# Patient Record
Sex: MALE | Race: WHITE | Employment: OTHER | URBAN - METROPOLITAN AREA
[De-identification: names, ages, dates, MRNs, and addresses within clinical notes are randomized per-mention and may not be internally consistent; named-entity substitution may affect disease eponyms.]

---

## 2017-03-22 ENCOUNTER — TELEPHONE (OUTPATIENT)
Dept: PRIMARY CARE CLINIC | Age: 42
End: 2017-03-22

## 2017-03-22 ENCOUNTER — OFFICE VISIT (OUTPATIENT)
Dept: PRIMARY CARE CLINIC | Age: 42
End: 2017-03-22
Payer: COMMERCIAL

## 2017-03-22 VITALS
SYSTOLIC BLOOD PRESSURE: 128 MMHG | BODY MASS INDEX: 40.57 KG/M2 | DIASTOLIC BLOOD PRESSURE: 82 MMHG | HEART RATE: 81 BPM | TEMPERATURE: 98.2 F | RESPIRATION RATE: 18 BRPM | WEIGHT: 258.5 LBS | HEIGHT: 67 IN

## 2017-03-22 DIAGNOSIS — Z76.89 ESTABLISHING CARE WITH NEW DOCTOR, ENCOUNTER FOR: ICD-10-CM

## 2017-03-22 DIAGNOSIS — R20.0 BILATERAL HAND NUMBNESS: Primary | ICD-10-CM

## 2017-03-22 PROCEDURE — 99203 OFFICE O/P NEW LOW 30 MIN: CPT | Performed by: NURSE PRACTITIONER

## 2017-03-22 ASSESSMENT — ENCOUNTER SYMPTOMS
RHINORRHEA: 0
DIARRHEA: 0
WHEEZING: 0
NAUSEA: 0
COUGH: 0
SHORTNESS OF BREATH: 0
CONSTIPATION: 0
SORE THROAT: 0
ABDOMINAL PAIN: 0
VOMITING: 0

## 2017-05-08 ENCOUNTER — OFFICE VISIT (OUTPATIENT)
Dept: PRIMARY CARE CLINIC | Age: 42
End: 2017-05-08
Payer: COMMERCIAL

## 2017-05-08 VITALS
HEART RATE: 113 BPM | RESPIRATION RATE: 18 BRPM | DIASTOLIC BLOOD PRESSURE: 88 MMHG | SYSTOLIC BLOOD PRESSURE: 141 MMHG | WEIGHT: 262.8 LBS | BODY MASS INDEX: 41.25 KG/M2 | TEMPERATURE: 97.9 F | HEIGHT: 67 IN

## 2017-05-08 DIAGNOSIS — R20.0 BILATERAL HAND NUMBNESS: Primary | ICD-10-CM

## 2017-05-08 DIAGNOSIS — R29.898 DECREASED GRIP STRENGTH OF RIGHT HAND: ICD-10-CM

## 2017-05-08 DIAGNOSIS — R29.898 DECREASED GRIP STRENGTH OF LEFT HAND: ICD-10-CM

## 2017-05-08 PROCEDURE — 99213 OFFICE O/P EST LOW 20 MIN: CPT | Performed by: NURSE PRACTITIONER

## 2017-05-08 ASSESSMENT — ENCOUNTER SYMPTOMS: BACK PAIN: 0

## 2017-05-23 ENCOUNTER — TELEPHONE (OUTPATIENT)
Dept: PRIMARY CARE CLINIC | Age: 42
End: 2017-05-23

## 2017-05-23 DIAGNOSIS — G56.03 BILATERAL CARPAL TUNNEL SYNDROME: Primary | ICD-10-CM

## 2017-06-23 ENCOUNTER — ANESTHESIA EVENT (OUTPATIENT)
Dept: OPERATING ROOM | Age: 42
End: 2017-06-23
Payer: COMMERCIAL

## 2017-06-26 ENCOUNTER — HOSPITAL ENCOUNTER (OUTPATIENT)
Age: 42
Setting detail: OUTPATIENT SURGERY
Discharge: HOME | End: 2017-06-26
Attending: ORTHOPAEDIC SURGERY | Admitting: ORTHOPAEDIC SURGERY
Payer: COMMERCIAL

## 2017-06-26 ENCOUNTER — ANESTHESIA (OUTPATIENT)
Dept: OPERATING ROOM | Age: 42
End: 2017-06-26
Payer: COMMERCIAL

## 2017-06-26 VITALS
BODY MASS INDEX: 39.24 KG/M2 | RESPIRATION RATE: 18 BRPM | SYSTOLIC BLOOD PRESSURE: 142 MMHG | TEMPERATURE: 97 F | HEIGHT: 67 IN | OXYGEN SATURATION: 97 % | DIASTOLIC BLOOD PRESSURE: 72 MMHG | HEART RATE: 62 BPM | WEIGHT: 250 LBS

## 2017-06-26 VITALS — OXYGEN SATURATION: 98 % | DIASTOLIC BLOOD PRESSURE: 80 MMHG | SYSTOLIC BLOOD PRESSURE: 126 MMHG

## 2017-06-26 PROCEDURE — 3600000013 HC SURGERY LEVEL 3 ADDTL 15MIN: Performed by: ORTHOPAEDIC SURGERY

## 2017-06-26 PROCEDURE — 7100000010 HC PHASE II RECOVERY - FIRST 15 MIN: Performed by: ORTHOPAEDIC SURGERY

## 2017-06-26 PROCEDURE — 3700000001 HC ADD 15 MINUTES (ANESTHESIA): Performed by: ORTHOPAEDIC SURGERY

## 2017-06-26 PROCEDURE — 6360000002 HC RX W HCPCS: Performed by: NURSE ANESTHETIST, CERTIFIED REGISTERED

## 2017-06-26 PROCEDURE — 6360000002 HC RX W HCPCS: Performed by: ORTHOPAEDIC SURGERY

## 2017-06-26 PROCEDURE — 2500000003 HC RX 250 WO HCPCS: Performed by: NURSE ANESTHETIST, CERTIFIED REGISTERED

## 2017-06-26 PROCEDURE — 2580000003 HC RX 258: Performed by: ORTHOPAEDIC SURGERY

## 2017-06-26 PROCEDURE — 3700000000 HC ANESTHESIA ATTENDED CARE: Performed by: ORTHOPAEDIC SURGERY

## 2017-06-26 PROCEDURE — 7100000011 HC PHASE II RECOVERY - ADDTL 15 MIN: Performed by: ORTHOPAEDIC SURGERY

## 2017-06-26 PROCEDURE — 3600000003 HC SURGERY LEVEL 3 BASE: Performed by: ORTHOPAEDIC SURGERY

## 2017-06-26 RX ORDER — FENTANYL CITRATE 50 UG/ML
50 INJECTION, SOLUTION INTRAMUSCULAR; INTRAVENOUS EVERY 5 MIN PRN
Status: DISCONTINUED | OUTPATIENT
Start: 2017-06-26 | End: 2017-06-26 | Stop reason: HOSPADM

## 2017-06-26 RX ORDER — ONDANSETRON 2 MG/ML
4 INJECTION INTRAMUSCULAR; INTRAVENOUS
Status: DISCONTINUED | OUTPATIENT
Start: 2017-06-26 | End: 2017-06-26 | Stop reason: HOSPADM

## 2017-06-26 RX ORDER — HYDROCODONE BITARTRATE AND ACETAMINOPHEN 5; 325 MG/1; MG/1
2 TABLET ORAL PRN
Status: DISCONTINUED | OUTPATIENT
Start: 2017-06-26 | End: 2017-06-26 | Stop reason: HOSPADM

## 2017-06-26 RX ORDER — PROPOFOL 10 MG/ML
INJECTION, EMULSION INTRAVENOUS CONTINUOUS PRN
Status: DISCONTINUED | OUTPATIENT
Start: 2017-06-26 | End: 2017-06-26 | Stop reason: SDUPTHER

## 2017-06-26 RX ORDER — ONDANSETRON 2 MG/ML
INJECTION INTRAMUSCULAR; INTRAVENOUS PRN
Status: DISCONTINUED | OUTPATIENT
Start: 2017-06-26 | End: 2017-06-26 | Stop reason: SDUPTHER

## 2017-06-26 RX ORDER — KETOROLAC TROMETHAMINE 30 MG/ML
INJECTION, SOLUTION INTRAMUSCULAR; INTRAVENOUS PRN
Status: DISCONTINUED | OUTPATIENT
Start: 2017-06-26 | End: 2017-06-26 | Stop reason: SDUPTHER

## 2017-06-26 RX ORDER — MIDAZOLAM HYDROCHLORIDE 1 MG/ML
INJECTION INTRAMUSCULAR; INTRAVENOUS PRN
Status: DISCONTINUED | OUTPATIENT
Start: 2017-06-26 | End: 2017-06-26 | Stop reason: SDUPTHER

## 2017-06-26 RX ORDER — ROPIVACAINE HYDROCHLORIDE 5 MG/ML
INJECTION, SOLUTION EPIDURAL; INFILTRATION; PERINEURAL PRN
Status: DISCONTINUED | OUTPATIENT
Start: 2017-06-26 | End: 2017-06-26 | Stop reason: HOSPADM

## 2017-06-26 RX ORDER — HYDROCODONE BITARTRATE AND ACETAMINOPHEN 5; 325 MG/1; MG/1
TABLET ORAL
Qty: 30 TABLET | Refills: 0 | Status: SHIPPED | OUTPATIENT
Start: 2017-06-26 | End: 2017-07-03

## 2017-06-26 RX ORDER — FENTANYL CITRATE 50 UG/ML
INJECTION, SOLUTION INTRAMUSCULAR; INTRAVENOUS PRN
Status: DISCONTINUED | OUTPATIENT
Start: 2017-06-26 | End: 2017-06-26 | Stop reason: SDUPTHER

## 2017-06-26 RX ORDER — PROPOFOL 10 MG/ML
INJECTION, EMULSION INTRAVENOUS PRN
Status: DISCONTINUED | OUTPATIENT
Start: 2017-06-26 | End: 2017-06-26 | Stop reason: SDUPTHER

## 2017-06-26 RX ORDER — LIDOCAINE HYDROCHLORIDE 20 MG/ML
INJECTION, SOLUTION INFILTRATION; PERINEURAL PRN
Status: DISCONTINUED | OUTPATIENT
Start: 2017-06-26 | End: 2017-06-26 | Stop reason: SDUPTHER

## 2017-06-26 RX ORDER — METOCLOPRAMIDE HYDROCHLORIDE 5 MG/ML
10 INJECTION INTRAMUSCULAR; INTRAVENOUS
Status: DISCONTINUED | OUTPATIENT
Start: 2017-06-26 | End: 2017-06-26 | Stop reason: HOSPADM

## 2017-06-26 RX ORDER — SODIUM CHLORIDE 0.9 % (FLUSH) 0.9 %
10 SYRINGE (ML) INJECTION PRN
Status: DISCONTINUED | OUTPATIENT
Start: 2017-06-26 | End: 2017-06-26 | Stop reason: HOSPADM

## 2017-06-26 RX ORDER — SODIUM CHLORIDE, SODIUM LACTATE, POTASSIUM CHLORIDE, CALCIUM CHLORIDE 600; 310; 30; 20 MG/100ML; MG/100ML; MG/100ML; MG/100ML
INJECTION, SOLUTION INTRAVENOUS CONTINUOUS
Status: DISCONTINUED | OUTPATIENT
Start: 2017-06-26 | End: 2017-06-26 | Stop reason: HOSPADM

## 2017-06-26 RX ORDER — SODIUM CHLORIDE 0.9 % (FLUSH) 0.9 %
10 SYRINGE (ML) INJECTION EVERY 12 HOURS SCHEDULED
Status: DISCONTINUED | OUTPATIENT
Start: 2017-06-26 | End: 2017-06-26 | Stop reason: HOSPADM

## 2017-06-26 RX ORDER — HYDROCODONE BITARTRATE AND ACETAMINOPHEN 5; 325 MG/1; MG/1
1 TABLET ORAL PRN
Status: DISCONTINUED | OUTPATIENT
Start: 2017-06-26 | End: 2017-06-26 | Stop reason: HOSPADM

## 2017-06-26 RX ADMIN — CEFAZOLIN SODIUM 2 G: 2 SOLUTION INTRAVENOUS at 14:19

## 2017-06-26 RX ADMIN — MIDAZOLAM HYDROCHLORIDE 1 MG: 1 INJECTION, SOLUTION INTRAMUSCULAR; INTRAVENOUS at 14:28

## 2017-06-26 RX ADMIN — PROPOFOL 75 MCG/KG/MIN: 10 INJECTION, EMULSION INTRAVENOUS at 14:33

## 2017-06-26 RX ADMIN — FENTANYL CITRATE 50 MCG: 50 INJECTION INTRAMUSCULAR; INTRAVENOUS at 14:27

## 2017-06-26 RX ADMIN — KETOROLAC TROMETHAMINE 30 MG: 30 INJECTION, SOLUTION INTRAMUSCULAR; INTRAVENOUS at 14:45

## 2017-06-26 RX ADMIN — FENTANYL CITRATE 25 MCG: 50 INJECTION INTRAMUSCULAR; INTRAVENOUS at 14:53

## 2017-06-26 RX ADMIN — MIDAZOLAM HYDROCHLORIDE 3 MG: 1 INJECTION, SOLUTION INTRAMUSCULAR; INTRAVENOUS at 14:25

## 2017-06-26 RX ADMIN — SODIUM CHLORIDE, POTASSIUM CHLORIDE, SODIUM LACTATE AND CALCIUM CHLORIDE: 600; 310; 30; 20 INJECTION, SOLUTION INTRAVENOUS at 13:01

## 2017-06-26 RX ADMIN — PROPOFOL 20 MG: 10 INJECTION, EMULSION INTRAVENOUS at 14:30

## 2017-06-26 RX ADMIN — LIDOCAINE HYDROCHLORIDE 40 MG: 20 INJECTION, SOLUTION INFILTRATION; PERINEURAL at 14:29

## 2017-06-26 RX ADMIN — ONDANSETRON 4 MG: 2 INJECTION INTRAMUSCULAR; INTRAVENOUS at 14:31

## 2017-06-26 RX ADMIN — SODIUM CHLORIDE, POTASSIUM CHLORIDE, SODIUM LACTATE AND CALCIUM CHLORIDE: 600; 310; 30; 20 INJECTION, SOLUTION INTRAVENOUS at 14:34

## 2017-06-26 RX ADMIN — FENTANYL CITRATE 25 MCG: 50 INJECTION INTRAMUSCULAR; INTRAVENOUS at 14:52

## 2017-06-26 ASSESSMENT — PAIN SCALES - GENERAL
PAINLEVEL_OUTOF10: 0

## 2017-07-14 ENCOUNTER — ANESTHESIA EVENT (OUTPATIENT)
Dept: OPERATING ROOM | Age: 42
End: 2017-07-14
Payer: COMMERCIAL

## 2017-07-17 ENCOUNTER — HOSPITAL ENCOUNTER (OUTPATIENT)
Age: 42
Setting detail: OUTPATIENT SURGERY
Discharge: HOME | End: 2017-07-17
Attending: ORTHOPAEDIC SURGERY | Admitting: ORTHOPAEDIC SURGERY
Payer: COMMERCIAL

## 2017-07-17 ENCOUNTER — ANESTHESIA (OUTPATIENT)
Dept: OPERATING ROOM | Age: 42
End: 2017-07-17
Payer: COMMERCIAL

## 2017-07-17 VITALS
RESPIRATION RATE: 18 BRPM | OXYGEN SATURATION: 96 % | HEART RATE: 81 BPM | DIASTOLIC BLOOD PRESSURE: 76 MMHG | SYSTOLIC BLOOD PRESSURE: 139 MMHG | WEIGHT: 255 LBS | TEMPERATURE: 97.5 F | BODY MASS INDEX: 40.02 KG/M2 | HEIGHT: 67 IN

## 2017-07-17 VITALS — DIASTOLIC BLOOD PRESSURE: 72 MMHG | SYSTOLIC BLOOD PRESSURE: 129 MMHG | OXYGEN SATURATION: 96 %

## 2017-07-17 PROCEDURE — 7100000011 HC PHASE II RECOVERY - ADDTL 15 MIN: Performed by: ORTHOPAEDIC SURGERY

## 2017-07-17 PROCEDURE — 3600000003 HC SURGERY LEVEL 3 BASE: Performed by: ORTHOPAEDIC SURGERY

## 2017-07-17 PROCEDURE — 3700000001 HC ADD 15 MINUTES (ANESTHESIA): Performed by: ORTHOPAEDIC SURGERY

## 2017-07-17 PROCEDURE — 2580000003 HC RX 258: Performed by: ORTHOPAEDIC SURGERY

## 2017-07-17 PROCEDURE — 6360000002 HC RX W HCPCS: Performed by: ORTHOPAEDIC SURGERY

## 2017-07-17 PROCEDURE — 6360000002 HC RX W HCPCS: Performed by: NURSE ANESTHETIST, CERTIFIED REGISTERED

## 2017-07-17 PROCEDURE — 3700000000 HC ANESTHESIA ATTENDED CARE: Performed by: ORTHOPAEDIC SURGERY

## 2017-07-17 PROCEDURE — 7100000010 HC PHASE II RECOVERY - FIRST 15 MIN: Performed by: ORTHOPAEDIC SURGERY

## 2017-07-17 PROCEDURE — 2500000003 HC RX 250 WO HCPCS: Performed by: NURSE ANESTHETIST, CERTIFIED REGISTERED

## 2017-07-17 PROCEDURE — 3600000013 HC SURGERY LEVEL 3 ADDTL 15MIN: Performed by: ORTHOPAEDIC SURGERY

## 2017-07-17 RX ORDER — HYDROCODONE BITARTRATE AND ACETAMINOPHEN 5; 325 MG/1; MG/1
TABLET ORAL
Qty: 30 TABLET | Refills: 0 | Status: SHIPPED | OUTPATIENT
Start: 2017-07-17 | End: 2017-07-24

## 2017-07-17 RX ORDER — PROPOFOL 10 MG/ML
INJECTION, EMULSION INTRAVENOUS PRN
Status: DISCONTINUED | OUTPATIENT
Start: 2017-07-17 | End: 2017-07-17 | Stop reason: SDUPTHER

## 2017-07-17 RX ORDER — PROPOFOL 10 MG/ML
INJECTION, EMULSION INTRAVENOUS CONTINUOUS PRN
Status: DISCONTINUED | OUTPATIENT
Start: 2017-07-17 | End: 2017-07-17 | Stop reason: SDUPTHER

## 2017-07-17 RX ORDER — ROPIVACAINE HYDROCHLORIDE 5 MG/ML
INJECTION, SOLUTION EPIDURAL; INFILTRATION; PERINEURAL PRN
Status: DISCONTINUED | OUTPATIENT
Start: 2017-07-17 | End: 2017-07-17 | Stop reason: HOSPADM

## 2017-07-17 RX ORDER — FENTANYL CITRATE 50 UG/ML
25 INJECTION, SOLUTION INTRAMUSCULAR; INTRAVENOUS EVERY 5 MIN PRN
Status: DISCONTINUED | OUTPATIENT
Start: 2017-07-17 | End: 2017-07-17 | Stop reason: HOSPADM

## 2017-07-17 RX ORDER — MEPERIDINE HYDROCHLORIDE 50 MG/ML
12.5 INJECTION INTRAMUSCULAR; INTRAVENOUS; SUBCUTANEOUS EVERY 5 MIN PRN
Status: DISCONTINUED | OUTPATIENT
Start: 2017-07-17 | End: 2017-07-17 | Stop reason: HOSPADM

## 2017-07-17 RX ORDER — METOCLOPRAMIDE HYDROCHLORIDE 5 MG/ML
10 INJECTION INTRAMUSCULAR; INTRAVENOUS
Status: DISCONTINUED | OUTPATIENT
Start: 2017-07-17 | End: 2017-07-17 | Stop reason: HOSPADM

## 2017-07-17 RX ORDER — LIDOCAINE HYDROCHLORIDE 20 MG/ML
INJECTION, SOLUTION INFILTRATION; PERINEURAL PRN
Status: DISCONTINUED | OUTPATIENT
Start: 2017-07-17 | End: 2017-07-17 | Stop reason: SDUPTHER

## 2017-07-17 RX ORDER — MIDAZOLAM HYDROCHLORIDE 1 MG/ML
INJECTION INTRAMUSCULAR; INTRAVENOUS PRN
Status: DISCONTINUED | OUTPATIENT
Start: 2017-07-17 | End: 2017-07-17 | Stop reason: SDUPTHER

## 2017-07-17 RX ORDER — FENTANYL CITRATE 50 UG/ML
INJECTION, SOLUTION INTRAMUSCULAR; INTRAVENOUS PRN
Status: DISCONTINUED | OUTPATIENT
Start: 2017-07-17 | End: 2017-07-17 | Stop reason: SDUPTHER

## 2017-07-17 RX ORDER — OXYCODONE HYDROCHLORIDE 5 MG/1
5 TABLET ORAL
Status: DISCONTINUED | OUTPATIENT
Start: 2017-07-17 | End: 2017-07-17 | Stop reason: HOSPADM

## 2017-07-17 RX ORDER — DEXAMETHASONE SODIUM PHOSPHATE 4 MG/ML
INJECTION, SOLUTION INTRA-ARTICULAR; INTRALESIONAL; INTRAMUSCULAR; INTRAVENOUS; SOFT TISSUE PRN
Status: DISCONTINUED | OUTPATIENT
Start: 2017-07-17 | End: 2017-07-17 | Stop reason: SDUPTHER

## 2017-07-17 RX ORDER — SODIUM CHLORIDE, SODIUM LACTATE, POTASSIUM CHLORIDE, CALCIUM CHLORIDE 600; 310; 30; 20 MG/100ML; MG/100ML; MG/100ML; MG/100ML
INJECTION, SOLUTION INTRAVENOUS CONTINUOUS
Status: DISCONTINUED | OUTPATIENT
Start: 2017-07-17 | End: 2017-07-17 | Stop reason: HOSPADM

## 2017-07-17 RX ADMIN — LIDOCAINE HYDROCHLORIDE 60 MG: 20 INJECTION, SOLUTION INFILTRATION; PERINEURAL at 17:05

## 2017-07-17 RX ADMIN — SODIUM CHLORIDE, POTASSIUM CHLORIDE, SODIUM LACTATE AND CALCIUM CHLORIDE: 600; 310; 30; 20 INJECTION, SOLUTION INTRAVENOUS at 14:45

## 2017-07-17 RX ADMIN — FENTANYL CITRATE 100 MCG: 50 INJECTION INTRAMUSCULAR; INTRAVENOUS at 17:02

## 2017-07-17 RX ADMIN — MIDAZOLAM HYDROCHLORIDE 2 MG: 1 INJECTION, SOLUTION INTRAMUSCULAR; INTRAVENOUS at 16:59

## 2017-07-17 RX ADMIN — PROPOFOL 35 MG: 10 INJECTION, EMULSION INTRAVENOUS at 17:11

## 2017-07-17 RX ADMIN — PROPOFOL 75 MCG/KG/MIN: 10 INJECTION, EMULSION INTRAVENOUS at 17:07

## 2017-07-17 RX ADMIN — CEFAZOLIN SODIUM 2 G: 2 SOLUTION INTRAVENOUS at 16:54

## 2017-07-17 RX ADMIN — PROPOFOL 50 MG: 10 INJECTION, EMULSION INTRAVENOUS at 17:05

## 2017-07-17 RX ADMIN — DEXAMETHASONE SODIUM PHOSPHATE 4 MG: 4 INJECTION, SOLUTION INTRAMUSCULAR; INTRAVENOUS at 17:10

## 2017-07-17 ASSESSMENT — PAIN SCALES - GENERAL
PAINLEVEL_OUTOF10: 0

## 2017-07-17 ASSESSMENT — PAIN - FUNCTIONAL ASSESSMENT: PAIN_FUNCTIONAL_ASSESSMENT: 0-10

## 2017-09-27 ENCOUNTER — OFFICE VISIT (OUTPATIENT)
Dept: PRIMARY CARE CLINIC | Age: 42
End: 2017-09-27
Payer: COMMERCIAL

## 2017-09-27 VITALS
BODY MASS INDEX: 45.14 KG/M2 | TEMPERATURE: 98.5 F | WEIGHT: 287.6 LBS | RESPIRATION RATE: 18 BRPM | HEART RATE: 96 BPM | HEIGHT: 67 IN | DIASTOLIC BLOOD PRESSURE: 94 MMHG | SYSTOLIC BLOOD PRESSURE: 156 MMHG

## 2017-09-27 DIAGNOSIS — R03.0 ELEVATED BLOOD PRESSURE READING: ICD-10-CM

## 2017-09-27 DIAGNOSIS — E11.42 CONTROLLED TYPE 2 DIABETES MELLITUS WITH DIABETIC POLYNEUROPATHY, WITHOUT LONG-TERM CURRENT USE OF INSULIN (HCC): Primary | ICD-10-CM

## 2017-09-27 DIAGNOSIS — R73.01 ELEVATED FASTING GLUCOSE: ICD-10-CM

## 2017-09-27 LAB — HBA1C MFR BLD: 7.2 %

## 2017-09-27 PROCEDURE — 99214 OFFICE O/P EST MOD 30 MIN: CPT | Performed by: NURSE PRACTITIONER

## 2017-09-27 PROCEDURE — 83036 HEMOGLOBIN GLYCOSYLATED A1C: CPT | Performed by: NURSE PRACTITIONER

## 2017-09-27 ASSESSMENT — ENCOUNTER SYMPTOMS
WHEEZING: 0
COUGH: 0
NAUSEA: 0
VISUAL CHANGE: 0
ABDOMINAL PAIN: 0
BLURRED VISION: 0
DIARRHEA: 0
SHORTNESS OF BREATH: 0
SORE THROAT: 0
RHINORRHEA: 0
CONSTIPATION: 0
VOMITING: 0

## 2017-09-27 ASSESSMENT — PATIENT HEALTH QUESTIONNAIRE - PHQ9
SUM OF ALL RESPONSES TO PHQ9 QUESTIONS 1 & 2: 2
1. LITTLE INTEREST OR PLEASURE IN DOING THINGS: 1
2. FEELING DOWN, DEPRESSED OR HOPELESS: 1
SUM OF ALL RESPONSES TO PHQ QUESTIONS 1-9: 2

## 2017-09-29 ENCOUNTER — HOSPITAL ENCOUNTER (OUTPATIENT)
Age: 42
Discharge: HOME | End: 2017-09-29
Payer: COMMERCIAL

## 2017-09-29 DIAGNOSIS — E11.42 CONTROLLED TYPE 2 DIABETES MELLITUS WITH DIABETIC POLYNEUROPATHY, WITHOUT LONG-TERM CURRENT USE OF INSULIN (HCC): ICD-10-CM

## 2017-09-29 LAB
ALBUMIN SERPL-MCNC: 4.3 G/DL (ref 3.5–5.2)
ALBUMIN/GLOBULIN RATIO: 1.2 (ref 1–2.5)
ALP BLD-CCNC: 82 U/L (ref 40–129)
ALT SERPL-CCNC: 28 U/L (ref 5–41)
ANION GAP SERPL CALCULATED.3IONS-SCNC: 16 MMOL/L (ref 9–17)
AST SERPL-CCNC: 23 U/L
BILIRUB SERPL-MCNC: 0.52 MG/DL (ref 0.3–1.2)
BUN BLDV-MCNC: 19 MG/DL (ref 6–20)
BUN/CREAT BLD: 20 (ref 9–20)
CALCIUM SERPL-MCNC: 9.3 MG/DL (ref 8.6–10.4)
CHLORIDE BLD-SCNC: 96 MMOL/L (ref 98–107)
CHOLESTEROL/HDL RATIO: 6.3
CHOLESTEROL: 214 MG/DL
CO2: 24 MMOL/L (ref 20–31)
CREAT SERPL-MCNC: 0.93 MG/DL (ref 0.7–1.2)
CREATININE URINE: 130.1 MG/DL (ref 39–259)
ESTIMATED AVERAGE GLUCOSE: 157 MG/DL
GFR AFRICAN AMERICAN: >60 ML/MIN
GFR NON-AFRICAN AMERICAN: >60 ML/MIN
GFR SERPL CREATININE-BSD FRML MDRD: ABNORMAL ML/MIN/{1.73_M2}
GFR SERPL CREATININE-BSD FRML MDRD: ABNORMAL ML/MIN/{1.73_M2}
GLUCOSE BLD-MCNC: 144 MG/DL (ref 70–99)
HBA1C MFR BLD: 7.1 % (ref 4.8–5.9)
HDLC SERPL-MCNC: 34 MG/DL
LDL CHOLESTEROL: 138 MG/DL (ref 0–130)
MICROALBUMIN/CREAT 24H UR: <12 MG/L
MICROALBUMIN/CREAT UR-RTO: 9 MCG/MG CREAT
POTASSIUM SERPL-SCNC: 4.5 MMOL/L (ref 3.7–5.3)
SODIUM BLD-SCNC: 136 MMOL/L (ref 135–144)
TOTAL PROTEIN: 7.8 G/DL (ref 6.4–8.3)
TRIGL SERPL-MCNC: 209 MG/DL
VLDLC SERPL CALC-MCNC: ABNORMAL MG/DL (ref 1–30)

## 2017-09-29 PROCEDURE — 80053 COMPREHEN METABOLIC PANEL: CPT

## 2017-09-29 PROCEDURE — 80061 LIPID PANEL: CPT

## 2017-09-29 PROCEDURE — 83036 HEMOGLOBIN GLYCOSYLATED A1C: CPT

## 2017-09-29 PROCEDURE — 82570 ASSAY OF URINE CREATININE: CPT

## 2017-09-29 PROCEDURE — 82043 UR ALBUMIN QUANTITATIVE: CPT

## 2017-09-29 PROCEDURE — 36415 COLL VENOUS BLD VENIPUNCTURE: CPT

## 2017-10-02 DIAGNOSIS — E78.5 DYSLIPIDEMIA: Primary | ICD-10-CM

## 2017-10-02 RX ORDER — ATORVASTATIN CALCIUM 20 MG/1
20 TABLET, FILM COATED ORAL DAILY
Qty: 90 TABLET | Refills: 1 | Status: SHIPPED | OUTPATIENT
Start: 2017-10-02 | End: 2019-01-29 | Stop reason: SDUPTHER

## 2017-10-03 ENCOUNTER — TELEPHONE (OUTPATIENT)
Dept: PRIMARY CARE CLINIC | Age: 42
End: 2017-10-03

## 2017-10-03 ENCOUNTER — NURSE ONLY (OUTPATIENT)
Dept: PRIMARY CARE CLINIC | Age: 42
End: 2017-10-03

## 2017-10-03 VITALS
TEMPERATURE: 96.9 F | RESPIRATION RATE: 18 BRPM | DIASTOLIC BLOOD PRESSURE: 88 MMHG | WEIGHT: 279.4 LBS | HEIGHT: 67 IN | BODY MASS INDEX: 43.85 KG/M2 | SYSTOLIC BLOOD PRESSURE: 157 MMHG | HEART RATE: 102 BPM

## 2017-10-03 DIAGNOSIS — I10 ESSENTIAL HYPERTENSION: Primary | ICD-10-CM

## 2017-10-03 RX ORDER — LOSARTAN POTASSIUM AND HYDROCHLOROTHIAZIDE 12.5; 5 MG/1; MG/1
1 TABLET ORAL DAILY
Qty: 30 TABLET | Refills: 1 | Status: SHIPPED | OUTPATIENT
Start: 2017-10-03 | End: 2017-12-06 | Stop reason: SDUPTHER

## 2017-10-04 ENCOUNTER — HOSPITAL ENCOUNTER (OUTPATIENT)
Dept: DIABETES SERVICES | Age: 42
Setting detail: THERAPIES SERIES
Discharge: HOME | End: 2017-10-04
Payer: COMMERCIAL

## 2017-10-04 PROCEDURE — G0108 DIAB MANAGE TRN  PER INDIV: HCPCS | Performed by: COUNSELOR

## 2017-10-04 ASSESSMENT — PATIENT HEALTH QUESTIONNAIRE - PHQ9
2. FEELING DOWN, DEPRESSED OR HOPELESS: 1
SUM OF ALL RESPONSES TO PHQ9 QUESTIONS 1 & 2: 1
4. FEELING TIRED OR HAVING LITTLE ENERGY: 1
7. TROUBLE CONCENTRATING ON THINGS, SUCH AS READING THE NEWSPAPER OR WATCHING TELEVISION: 0
10. IF YOU CHECKED OFF ANY PROBLEMS, HOW DIFFICULT HAVE THESE PROBLEMS MADE IT FOR YOU TO DO YOUR WORK, TAKE CARE OF THINGS AT HOME, OR GET ALONG WITH OTHER PEOPLE: 1
5. POOR APPETITE OR OVEREATING: 1
1. LITTLE INTEREST OR PLEASURE IN DOING THINGS: 0
9. THOUGHTS THAT YOU WOULD BE BETTER OFF DEAD, OR OF HURTING YOURSELF: 0
8. MOVING OR SPEAKING SO SLOWLY THAT OTHER PEOPLE COULD HAVE NOTICED. OR THE OPPOSITE, BEING SO FIGETY OR RESTLESS THAT YOU HAVE BEEN MOVING AROUND A LOT MORE THAN USUAL: 0
3. TROUBLE FALLING OR STAYING ASLEEP: 1
6. FEELING BAD ABOUT YOURSELF - OR THAT YOU ARE A FAILURE OR HAVE LET YOURSELF OR YOUR FAMILY DOWN: 1
SUM OF ALL RESPONSES TO PHQ QUESTIONS 1-9: 5

## 2017-10-04 NOTE — PROGRESS NOTES
Diabetes Self-Management Education Record    Progress Note:    Participant Name: Rm Smith  Referring Provider:  Martha Lewis CNP    Keys to learning:  Considerations: []Language []Emotional []Health Literacy  []Cognitive []Memory changes []Financial []Cultural   []Quaker []Vision []Hearing  []Speech []Lack of desire  []Literacy  []Psycho-social  []None  If considerations are noted, accommodations made:     Identified barriers to learning/self management:     The following information was discussed:    [x] Diabetes disease process and treatment options   [x] Healthy nutrition, carbohydrate counting, meal planning  [] Monitoring blood glucose and other parameters; interpreting and using results  [x] Acute complications--prevention, detection and treatment  [x] Medication management and safety Instructed by: [] Pharmacist [] nurse  [x] Incorporating physical activity into lifestyle Instructed by:[] Exercise physiologist [] nurse  [x] Exercise for Health, Reducing Risks for Heart Disease, Diabetes and Heart Health  [] Preventing, through risk reduction behaviors, detecting, and treating chronic complications  [] Sick Day management  [] Developing personalized strategies to address psychosocial issues and concerns  [] Developing personalized strategies to promote health and behavior change through goalsetting, behavior change strategies aimed at risk reduction  [] Special situations--disaster planning, travel, social activities    Session Assessment & Evaluation Ratings:  1=Needs Instruction  2=Needs Review  3=Comprehends Key Points  4=Demonstrates Understanding/Competency  NC=Not Covered   N/A=Not Applicable Initial  Assess    Date:  10-4-17 2nd  Visit     Date:   3rd  Visit    Date: 4th  Visit    Date: Comments  S.O.C=Stage of Change/Readiness to change:  · Pre=Pre-contemplation stage--not thinking about changing  · C=Contemplation stage--ambivalent about changing  · P=Preparation stage--prepared to made a specific change  · A=Action stage--committed to modify behaviors  · M=Maintenance and relapse prevention--incorporating new behavior   Participant Stated  Goal      Eat three meals per day 60 grams of carbs and 15 grams for snack at night                      Participant Stated Goal       S. O.C  [] PRE  [] C  [x] P      [] A  [] M                    S.O.C  [] PRE  [] C  [] P      [] A  [] M     S.O.C  [] PRE  [] C  [] P      [] A  [] M                     S.O.C  [] PRE  [] C  [] P      [] A  [] M      S.O.C  [] PRE  [] C  [] P      [] A  [] M                    S.O.C  [] PRE  [] C  [] P      [] A  [] M     S.O.C  [] PRE  [] C  [] P      [] A  [] M                    S.O.C  [] PRE  [] C  [] P      [] A  [] M   Current main goal attainment frequency:   [] Never  [] Some  [] Half  [] Most  [] All    Participant confidence to master goal this visit:   [] Excellent  [] Good  [] Rafael Montero  [] Poor            Current main goal attainment frequency:   [] Never  [] Some  [] Half  [] Most  [] All    Participant confidence to master goal this visit:   [] Excellent  [] Good [] Fair  [] Poor                  Session  Topics & Learning Objectives:      Comments:   Diabetes disease process & Treatment process: Define diabetes & identify own type of diabetes; Identify options for treating diabetes                  Rating  [] 1  [] 2  [x] 3  [] 4      [] NC  [] N/A   Rating  [] 1  [] 2  [] 3  [] 4  [] NC  [] N/A     Rating  [] 1  [] 2  [] 3  [] 4  [] NC  [] N/A     Rating  [] 1  [] 2  [] 3  [] 4  [] NC  [] N/A           Incorporating nutritional management into lifestyle: Describe effect of type, amount & timing of food on blood glucose; Describe basic meal planning techniques & current nutrition guidelines; Correctly read food labels & demonstrate CHO counting & portion control with personalized meal plan.   Rating  [] 1  [] 2  [x] 3  [] 4  [] NC  [] N/A     Rating  [] 1  [] 2  [] 3  [] 4  [] NC  [] N/A     Rating  [] 1  [] 2  [] 3  [] 4  [] NC  [] N/A     Rating  [] 1  [] 2  [] 3  [] 4  [] NC  [] N/A                 Incorporating physical activity into lifestyle:   State effect of exercise on blood glucose levels. Identifies personal exercise plan. Discussed safety tips while exercising. Rating  [] 1  [] 2  [x] 3  [] 4  [] NC  [] N/A     Rating  [] 1  [] 2  [] 3  [] 4  [] NC  [] N/A       Rating  [] 1  [] 2  [] 3  [] 4  [] NC  [] N/A     Rating  [] 1  [] 2  [] 3  [] 4  [] NC  [] N/A           Using medications safely: State effect of diabetes medicines on diabetes;   Name diabetes medication taking, action, timing & side effects     Rating  [] 1  [] 2  [x] 3  [] 4  [] NC  [] N/A       Rating  [] 1  [] 2  [] 3  [] 4  [] NC  [] N/A       Rating  [] 1  [] 2  [] 3  [] 4  [] NC  [] N/A       Rating  [] 1  [] 2  [] 3  [] 4  [] NC  [] N/A           Monitoring blood glucose, interpreting and using results: Identify recommended blood glucose targets, personal targets, appropriate techniques and problem solving. Rating  [] 1  [] 2  [x] 3  [] 4  [] NC  [] N/A     Rating  [] 1  [] 2  [] 3  [] 4  [] NC  [] N/A       Rating  [] 1  [] 2  [] 3  [] 4  [] NC  [] N/A     Rating  [] 1  [] 2  [] 3  [] 4  [] NC  [] N/A         Prevention, detection & treatment of acute complications: List symptoms of hyper- and hypoglycemia;    Describe how to treat low blood sugar & actions for lowering high blood glucose levels  Rating  [] 1  [] 2  [x] 3  [] 4  [] NC  [] N/A     Rating  [] 1  [] 2  [] 3  [] 4  [] NC  [] N/A     Rating  [] 1  [] 2  [] 3  [] 4  [] NC  [] N/A     Rating  [] 1  [] 2  [] 3  [] 4  [] NC  [] N/A         Prevention, detection & treatment of chronic complications: Define the natural course of diabetes & describe the relationship of blood glucose levels to long term complications of diabetes               Rating  [] 1  [] 2  [x] 3  [] 4  [] NC  [] N/A     Rating  [] 1  [] 2  [] 3  [] 4  [] NC  [] N/A     Rating  [] 1  [] 2  [] 3  [] 4  [] NC  [] N/A     Rating  [] 1  [] 2  [] 3  [] 4  [] NC  [] N/A      Developing strategies to address psychosocial issues: Describe feelings about living with diabetes; Identify support needed & support network. Rating  [] 1  [] 2  [x] 3  [] 4  [] NC  [] N/A       Rating  [] 1  [] 2  [] 3  [] 4  [] NC  [] N/A     Rating  [] 1  [] 2  [] 3  [] 4  [] NC  [] N/A     Rating  [] 1  [] 2  [] 3  [] 4  [] NC  [] N/A          Developing strategies to promote health/change behavior:  Define the ABCs of diabetes; Identify appropriate screenings, schedule & personal plan for screenings.       Rating  [] 1  [] 2  [x] 3  [] 4  [] N  [] N/A     Rating  [] 1  [] 2  [] 3  [] 4  [] NC  [] N/A     Rating  [] 1  [] 2  [] 3  [] 4  [] NC  [] N/A     Rating  [] 1  [] 2  [] 3  [] 4  [] NC  [] N/A               Handouts/Booklets given:     [x] Living Well with Diabetes    [x] Daily Diabetic Meal Planning Guide   [] Nutrition in the [facility]    [] Resources for People With Diabetes   [] Other

## 2017-10-17 ENCOUNTER — NURSE ONLY (OUTPATIENT)
Dept: PRIMARY CARE CLINIC | Age: 42
End: 2017-10-17

## 2017-10-17 VITALS
SYSTOLIC BLOOD PRESSURE: 121 MMHG | DIASTOLIC BLOOD PRESSURE: 73 MMHG | BODY MASS INDEX: 42.99 KG/M2 | HEART RATE: 88 BPM | WEIGHT: 273.9 LBS | TEMPERATURE: 97.5 F | HEIGHT: 67 IN | RESPIRATION RATE: 18 BRPM

## 2017-10-17 DIAGNOSIS — I10 ESSENTIAL HYPERTENSION: Primary | ICD-10-CM

## 2017-11-29 ENCOUNTER — OFFICE VISIT (OUTPATIENT)
Dept: PRIMARY CARE CLINIC | Age: 42
End: 2017-11-29
Payer: COMMERCIAL

## 2017-11-29 VITALS
SYSTOLIC BLOOD PRESSURE: 127 MMHG | BODY MASS INDEX: 40.84 KG/M2 | RESPIRATION RATE: 16 BRPM | TEMPERATURE: 97.7 F | HEART RATE: 96 BPM | WEIGHT: 260.2 LBS | HEIGHT: 67 IN | DIASTOLIC BLOOD PRESSURE: 83 MMHG

## 2017-11-29 DIAGNOSIS — I10 ESSENTIAL HYPERTENSION: ICD-10-CM

## 2017-11-29 DIAGNOSIS — R20.0 BILATERAL HAND NUMBNESS: ICD-10-CM

## 2017-11-29 DIAGNOSIS — E11.42 CONTROLLED TYPE 2 DIABETES MELLITUS WITH DIABETIC POLYNEUROPATHY, WITHOUT LONG-TERM CURRENT USE OF INSULIN (HCC): Primary | ICD-10-CM

## 2017-11-29 LAB — HBA1C MFR BLD: 6.2 %

## 2017-11-29 PROCEDURE — 83036 HEMOGLOBIN GLYCOSYLATED A1C: CPT | Performed by: NURSE PRACTITIONER

## 2017-11-29 PROCEDURE — 99214 OFFICE O/P EST MOD 30 MIN: CPT | Performed by: NURSE PRACTITIONER

## 2017-11-29 ASSESSMENT — ENCOUNTER SYMPTOMS
SORE THROAT: 0
BLURRED VISION: 0
ORTHOPNEA: 0
NAUSEA: 0
VOMITING: 0
RHINORRHEA: 0
SHORTNESS OF BREATH: 0
CONSTIPATION: 0
WHEEZING: 0
ABDOMINAL PAIN: 0
DIARRHEA: 0
VISUAL CHANGE: 0
COUGH: 0

## 2017-11-29 NOTE — PATIENT INSTRUCTIONS
Patient Education        Diabetic Neuropathy: Care Instructions  Your Care Instructions  When you have diabetes, your blood sugar level may get too high. Over time, high blood sugar levels can damage nerves. This is called diabetic neuropathy. Nerve damage can cause pain, burning, tingling, and numbness and may leave you feeling weak. The feet are often affected. When you have nerve damage in your feet, you cannot feel your feet and toes as well as normal and may not notice cuts or sores. Even a small injury can lead to a serious infection. It is very important that you follow your doctor's advice on foot care. Sometimes diabetes damages nerves that help the body function. If this happens, your blood pressure, sweating, digestion, and urination might be affected. Your doctor may give you a target blood sugar level that is higher or lower than you are used to. Try to keep your blood sugar very close to this target level to prevent more damage. Follow-up care is a key part of your treatment and safety. Be sure to make and go to all appointments, and call your doctor if you are having problems. It's also a good idea to know your test results and keep a list of the medicines you take. How can you care for yourself at home? · Take your medicines exactly as prescribed. Call your doctor if you think you are having a problem with your medicine. It is very important that you take your insulin or diabetes pills as your doctor tells you. · Try to keep blood sugar at your target level. ¨ Eat a variety of healthy foods, with carbohydrate spread out in your meals. A dietitian can help you plan meals. ¨ Try to get at least 30 minutes of exercise on most days. ¨ Check your blood sugar as many times each day as your doctor recommends. · Take and record your blood pressure at home if your doctor tells you to. Learn the importance of the two measures of blood pressure (such as 130 over 80, or 130/80).  To take your blood pressure at home:  ¨ Ask your doctor to check your blood pressure monitor to be sure it is accurate and the cuff fits you. Also ask your doctor to watch you to make sure that you are using it right. ¨ Do not use medicine known to raise blood pressure (such as some nasal decongestant sprays) before taking your blood pressure. ¨ Avoid taking your blood pressure if you have just exercised or are nervous or upset. Rest at least 15 minutes before you take a reading. · Take pain medicines exactly as directed. ¨ If the doctor gave you a prescription medicine for pain, take it as prescribed. ¨ If you are not taking a prescription pain medicine, ask your doctor if you can take an over-the-counter medicine. · Do not smoke. Smoking can increase your chance for a heart attack or stroke. If you need help quitting, talk to your doctor about stop-smoking programs and medicines. These can increase your chances of quitting for good. · Limit alcohol to 2 drinks a day for men and 1 drink a day for women. Too much alcohol can cause health problems. · Eat small meals often, rather than 2 or 3 large meals a day. To care for your feet  · Prevent injury by wearing shoes at all times, even when you are indoors. · Do foot care as part of your daily routine. Wash your feet and then rub lotion on your feet, but not between your toes. Use a handheld mirror or magnifying mirror to inspect your feet for blisters, cuts, cracks, or sores. · Have your toenails trimmed and filed straight across. · Wear shoes and socks that fit well. Soft shoes that have good support and that fit well (such as tennis shoes) are best for your feet. · Check your shoes for any loose objects or rough edges before you put them on. · Ask your doctor to check your feet during each visit. Your doctor may notice a foot problem you have missed. · Get early treatment for any foot problem, even a minor one. When should you call for help?   Call your doctor now or seek immediate medical care if:  · You have symptoms of infection, such as:  ¨ Increased pain, swelling, warmth, or redness. ¨ Red streaks leading from the area. ¨ Pus draining from the area. ¨ A fever. · You have new or worse numbness, pain, or tingling in any part of your body. Watch closely for changes in your health, and be sure to contact your doctor if:  · You have a new problem with your feet, such as:  ¨ A new sore or ulcer. ¨ A break in the skin that is not healing after several days. ¨ Bleeding corns or calluses. ¨ An ingrown toenail. · You do not get better as expected. Where can you learn more? Go to https://Definigen."[employer]. org and sign in to your [employer]Radius account. Enter U221 in the OneTrueFan box to learn more about \"Diabetic Neuropathy: Care Instructions. \"     If you do not have an account, please click on the \"Sign Up Now\" link. Current as of: March 13, 2017  Content Version: 11.3  © 1380-5159 [employer]. Care instructions adapted under license by [facility] ([facility]). If you have questions about a medical condition or this instruction, always ask your healthcare professional. [address] any warranty or liability for your use of this information.

## 2017-12-06 ENCOUNTER — TELEPHONE (OUTPATIENT)
Dept: PRIMARY CARE CLINIC | Age: 42
End: 2017-12-06

## 2017-12-06 DIAGNOSIS — I10 ESSENTIAL HYPERTENSION: ICD-10-CM

## 2017-12-06 RX ORDER — LOSARTAN POTASSIUM AND HYDROCHLOROTHIAZIDE 12.5; 5 MG/1; MG/1
TABLET ORAL
Qty: 90 TABLET | Refills: 1 | Status: SHIPPED | OUTPATIENT
Start: 2017-12-06 | End: 2019-01-29 | Stop reason: SDUPTHER

## 2018-07-10 ENCOUNTER — TELEPHONE (OUTPATIENT)
Dept: PRIMARY CARE CLINIC | Age: 43
End: 2018-07-10

## 2018-07-10 ENCOUNTER — HOSPITAL ENCOUNTER (EMERGENCY)
Age: 43
Discharge: HOME | End: 2018-07-10
Attending: EMERGENCY MEDICINE
Payer: COMMERCIAL

## 2018-07-10 VITALS
BODY MASS INDEX: 39.24 KG/M2 | TEMPERATURE: 96.7 F | HEIGHT: 67 IN | WEIGHT: 250 LBS | HEART RATE: 93 BPM | DIASTOLIC BLOOD PRESSURE: 95 MMHG | OXYGEN SATURATION: 97 % | SYSTOLIC BLOOD PRESSURE: 135 MMHG | RESPIRATION RATE: 16 BRPM

## 2018-07-10 DIAGNOSIS — G51.0 BELL'S PALSY: Primary | ICD-10-CM

## 2018-07-10 PROCEDURE — 99283 EMERGENCY DEPT VISIT LOW MDM: CPT

## 2018-07-10 RX ORDER — CEFDINIR 300 MG/1
300 CAPSULE ORAL 2 TIMES DAILY
Qty: 20 CAPSULE | Refills: 0 | Status: SHIPPED | OUTPATIENT
Start: 2018-07-10 | End: 2018-07-20

## 2018-07-10 RX ORDER — ACYCLOVIR 200 MG/1
400 CAPSULE ORAL
Qty: 50 CAPSULE | Refills: 0 | Status: SHIPPED | OUTPATIENT
Start: 2018-07-10 | End: 2018-07-20

## 2018-07-10 RX ORDER — MINERAL OIL, PETROLATUM 425; 568 MG/G; MG/G
OINTMENT OPHTHALMIC
Qty: 60 G | Refills: 0 | Status: SHIPPED | OUTPATIENT
Start: 2018-07-10 | End: 2018-08-10

## 2018-07-10 RX ORDER — PREDNISONE 10 MG/1
TABLET ORAL
Qty: 28 TABLET | Refills: 0 | Status: SHIPPED | OUTPATIENT
Start: 2018-07-10 | End: 2019-01-29 | Stop reason: ALTCHOICE

## 2018-07-10 ASSESSMENT — ENCOUNTER SYMPTOMS
SINUS PRESSURE: 0
EYE DISCHARGE: 0
SINUS PAIN: 0
VOMITING: 0
SHORTNESS OF BREATH: 0
ABDOMINAL PAIN: 0
SORE THROAT: 0
EYE PAIN: 0
COUGH: 0
DIARRHEA: 0
NAUSEA: 0

## 2018-07-10 NOTE — ED PROVIDER NOTES
[facility] ED  eMERGENCY dEPARTMENT eNCOUnter      Pt Name: Gavin Flores  MRN: [id number]  [place] 1975  Date of evaluation: 7/10/2018  Provider: Shayne Hernandez MD    CHIEF COMPLAINT       Chief Complaint   Patient presents with    Facial Droop     Left side, noticed yesterday per pt. States it started with ear pain 4 days ago     Eye Drainage     Onset 3-4 days ago with tearing       HISTORY OF PRESENT ILLNESS    Gavin Flores is a 37 y.o. male who presents to the emergency department from home. The patient came to the emergency room complaining of left facial droop. His symptoms started 4 days ago when he felt pain in his left either which he ignored then next day in the morning he had left facial droop and inability to smile. He had difficulty closing his left eye. And he states that the taste of food on his left side of the tongue is changed. There is no other acute neurological abnormality. The patient denies chest pain. The patient denies shortness of breath. The patient denies neck pain. The patient denies back pain. Triage notes and Nursing notes were reviewed by myself. Any discrepancies are addressed above.     PAST MEDICAL HISTORY     Past Medical History:   Diagnosis Date    Hyperlipidemia     Hypertension     Pre-diabetes        SURGICAL HISTORY       Past Surgical History:   Procedure Laterality Date    CARPAL TUNNEL RELEASE Left 06/26/2017    Dr. Mickie Rojas Bilateral     HERNIA REPAIR Left     R    MD REVISE MEDIAN N/CARPAL TUNNEL SURG Left 6/26/2017    CARPAL TUNNEL RELEASE performed by Rex Aschoff, MD at [address] [place] [address] N/CARPAL TUNNEL SURG Right 7/17/2017    CARPAL TUNNEL RELEASE performed by Rex Aschoff, [facility] at [facility]       Discharge Medication List as of 7/10/2018  9:45 AM      CONTINUE these medications which have NOT CHANGED    Details   losartan-hydrochlorothiazide (HYZAAR) 50-12.5 MG per tablet TAKE ONE TABLET BY MOUTH ONCE DAILY, Disp-90 tablet, R-1Please consider 90 day supplies to promote better adherenceNormal      atorvastatin (LIPITOR) 20 MG tablet Take 1 tablet by mouth daily, Disp-90 tablet, R-1Normal      sitaGLIPtan-metformin (JANUMET)  MG per tablet Take 1 tablet by mouth 2 times daily (with meals), Disp-180 tablet, R-3Print             ALLERGIES     Patient has no known allergies. FAMILY HISTORY       Family History   Problem Relation Age of Onset    Cancer Mother     High Cholesterol Father     Cancer Maternal Grandmother     Stroke Maternal Grandfather     Cancer Paternal Grandfather         SOCIAL HISTORY       Social History     Social History    Marital status: Single     Spouse name: N/A    Number of children: N/A    Years of education: N/A     Social History Main Topics    Smoking status: Never Smoker    Smokeless tobacco: Never Used    Alcohol use No    Drug use: No    Sexual activity: Not Asked     Other Topics Concern    None     Social History Narrative    None       REVIEW OF SYSTEMS       Review of Systems   Constitutional: Negative for chills, fatigue and fever. HENT: Negative for congestion, ear pain, sinus pain, sinus pressure and sore throat. Eyes: Negative for pain, discharge and visual disturbance. Respiratory: Negative for cough and shortness of breath. Cardiovascular: Negative for chest pain and palpitations. Gastrointestinal: Negative for abdominal pain, diarrhea, nausea and vomiting. Endocrine: Negative for cold intolerance and polydipsia. Genitourinary: Negative for difficulty urinating, dysuria and flank pain. Musculoskeletal: Negative for arthralgias, gait problem and myalgias. Skin: Negative for rash and wound. Allergic/Immunologic: Negative for environmental allergies and immunocompromised state. Neurological: Positive for facial asymmetry. Negative for dizziness, weakness and headaches.    Hematological: Negative for adenopathy. Does not bruise/bleed easily. Psychiatric/Behavioral: Negative for agitation and suicidal ideas. Except as noted above the remainder of the review of systems was reviewed and is negative. PHYSICAL EXAM    (up to 7 for level 4, 8 or more for level 5)     ED Triage Vitals   BP Temp Temp Source Pulse Resp SpO2 Height Weight   07/10/18 0859 07/10/18 0843 07/10/18 0843 07/10/18 0843 07/10/18 0843 07/10/18 0843 07/10/18 0843 07/10/18 0843   (!) 132/95 96.7 °F (35.9 °C) Tympanic 103 16 97 % 5' 7\" (1.702 m) 250 lb (113.4 kg)       Physical Exam   Constitutional: He is oriented to person, place, and time. He appears well-developed and well-nourished. No distress. HENT:   Head: Normocephalic and atraumatic. Mouth/Throat: No oropharyngeal exudate. There is erythema and left tympanic membrane left ear canal.   Eyes: Conjunctivae are normal. Right eye exhibits no discharge. Left eye exhibits no discharge. Neck: Normal range of motion. Neck supple. No tracheal deviation present. Cardiovascular: Normal rate, regular rhythm, normal heart sounds and intact distal pulses. Pulmonary/Chest: Effort normal and breath sounds normal. No stridor. No respiratory distress. He has no wheezes. He has no rales. Abdominal: Soft. He exhibits no distension. There is no tenderness. There is no rebound and no guarding. Musculoskeletal: Normal range of motion. He exhibits no edema or deformity. Neurological: He is alert and oriented to person, place, and time. A cranial nerve deficit (Left facial nerve lower motor neuron lesion involving the upper and lower halves of the face resulting in left facial droop) is present. Skin: Skin is warm and dry. No rash noted. He is not diaphoretic. No erythema. Psychiatric: He has a normal mood and affect. His behavior is normal.   Nursing note and vitals reviewed.       DIAGNOSTIC RESULTS     EKG: (none if blank)  All EKG's are interpreted by the Emergency Department Physician who either signs or Co-signs this chart in the absence of a cardiologist.        RADIOLOGY: (none if blank)   Interpretation per the Radiologist below, if available at the time of this note:    No orders to display       LABS:  Labs Reviewed - No data to display    All other labs were within normal range or not returned as of this dictation. EMERGENCY DEPARTMENT COURSE and Medical Decision Making:     MDM/   Patient's symptoms are consistent with Bell's palsy. Because of his background of having family history of stroke I explained with him and offered him to do a CT scan of his head to rule out the possibility of stroke the patient declined and I kind a tree with him that this is most probably Bell's palsy. We are going to treat as such and I expect instructed him to follow up with his primary care physician or return to the emergency room if his symptoms are not improving. Strict return precautions and follow up instructions were discussed with the patient with which the patient agrees    ED Medications administered this visit:  Medications - No data to display    CONSULTS: (None if blank)  None    Procedures: (None if blank)       CLINICAL IMPRESSION      1.  Bell's palsy          DISPOSITION/PLAN   DISPOSITION Decision To Discharge 07/10/2018 09:36:29 AM      PATIENT REFERRED TO:  ARNOLD Stanley - [facility]  [address] [id number]  [phone]    In 2 days  For reevaluation      DISCHARGE MEDICATIONS:  Discharge Medication List as of 7/10/2018  9:45 AM      START taking these medications    Details   predniSONE (DELTASONE) 10 MG tablet 1 tablet by mouth 3 times a day for 4 days  1 tablet by mouth twice a day for 4 days  1 tablet by mouth once a day for 4 days  Half tablet by mouth once a day for 4 days, Disp-28 tablet, R-0Print      cefdinir (OMNICEF) 300 MG capsule Take 1 capsule by mouth 2 times daily for 10 days, Disp-20 capsule, R-0Print      acyclovir (ZOVIRAX) 200 MG

## 2018-07-11 ENCOUNTER — TELEPHONE (OUTPATIENT)
Dept: PRIMARY CARE CLINIC | Age: 43
End: 2018-07-11

## 2018-07-11 NOTE — TELEPHONE ENCOUNTER
Pt called stating he went to the ER yesterday and they diagnosed him with [place] Palsy and started him on medication. He just wanted to call and let us know what happened with that.

## 2019-01-29 ENCOUNTER — OFFICE VISIT (OUTPATIENT)
Dept: PRIMARY CARE CLINIC | Age: 44
End: 2019-01-29
Payer: COMMERCIAL

## 2019-01-29 VITALS
RESPIRATION RATE: 14 BRPM | DIASTOLIC BLOOD PRESSURE: 96 MMHG | BODY MASS INDEX: 37.79 KG/M2 | WEIGHT: 240.8 LBS | TEMPERATURE: 98 F | HEIGHT: 67 IN | SYSTOLIC BLOOD PRESSURE: 117 MMHG | HEART RATE: 121 BPM

## 2019-01-29 DIAGNOSIS — E78.5 DYSLIPIDEMIA: ICD-10-CM

## 2019-01-29 DIAGNOSIS — F34.1 DYSTHYMIA: ICD-10-CM

## 2019-01-29 DIAGNOSIS — I10 ESSENTIAL HYPERTENSION: ICD-10-CM

## 2019-01-29 DIAGNOSIS — E11.42 CONTROLLED TYPE 2 DIABETES MELLITUS WITH DIABETIC POLYNEUROPATHY, WITHOUT LONG-TERM CURRENT USE OF INSULIN (HCC): Primary | ICD-10-CM

## 2019-01-29 LAB — HBA1C MFR BLD: 5.7 %

## 2019-01-29 PROCEDURE — 99214 OFFICE O/P EST MOD 30 MIN: CPT | Performed by: NURSE PRACTITIONER

## 2019-01-29 PROCEDURE — 83036 HEMOGLOBIN GLYCOSYLATED A1C: CPT | Performed by: NURSE PRACTITIONER

## 2019-01-29 RX ORDER — ATORVASTATIN CALCIUM 20 MG/1
20 TABLET, FILM COATED ORAL DAILY
Qty: 90 TABLET | Refills: 3 | Status: SHIPPED | OUTPATIENT
Start: 2019-01-29 | End: 2019-05-13 | Stop reason: SDUPTHER

## 2019-01-29 RX ORDER — LOSARTAN POTASSIUM AND HYDROCHLOROTHIAZIDE 12.5; 5 MG/1; MG/1
TABLET ORAL
Qty: 90 TABLET | Refills: 3 | Status: SHIPPED | OUTPATIENT
Start: 2019-01-29 | End: 2019-05-28 | Stop reason: SDUPTHER

## 2019-01-29 ASSESSMENT — ENCOUNTER SYMPTOMS
ABDOMINAL PAIN: 0
SHORTNESS OF BREATH: 0
SORE THROAT: 0
WHEEZING: 0
VOMITING: 0
BLURRED VISION: 0
RHINORRHEA: 0
DIARRHEA: 0
COUGH: 0
NAUSEA: 0
CONSTIPATION: 0
VISUAL CHANGE: 0

## 2019-01-29 ASSESSMENT — PATIENT HEALTH QUESTIONNAIRE - PHQ9
1. LITTLE INTEREST OR PLEASURE IN DOING THINGS: 1
2. FEELING DOWN, DEPRESSED OR HOPELESS: 1
SUM OF ALL RESPONSES TO PHQ9 QUESTIONS 1 & 2: 2
SUM OF ALL RESPONSES TO PHQ QUESTIONS 1-9: 2
SUM OF ALL RESPONSES TO PHQ QUESTIONS 1-9: 2

## 2019-02-12 ENCOUNTER — NURSE ONLY (OUTPATIENT)
Dept: PRIMARY CARE CLINIC | Age: 44
End: 2019-02-12

## 2019-02-12 VITALS
WEIGHT: 235.8 LBS | BODY MASS INDEX: 36.93 KG/M2 | HEART RATE: 104 BPM | RESPIRATION RATE: 20 BRPM | SYSTOLIC BLOOD PRESSURE: 129 MMHG | DIASTOLIC BLOOD PRESSURE: 71 MMHG | TEMPERATURE: 97.6 F

## 2019-02-12 DIAGNOSIS — Z01.30 BLOOD PRESSURE CHECK: Primary | ICD-10-CM

## 2019-05-13 DIAGNOSIS — E11.42 CONTROLLED TYPE 2 DIABETES MELLITUS WITH DIABETIC POLYNEUROPATHY, WITHOUT LONG-TERM CURRENT USE OF INSULIN (HCC): ICD-10-CM

## 2019-05-13 DIAGNOSIS — E78.5 DYSLIPIDEMIA: Primary | ICD-10-CM

## 2019-05-13 RX ORDER — ATORVASTATIN CALCIUM 20 MG/1
20 TABLET, FILM COATED ORAL DAILY
Qty: 90 TABLET | Refills: 3 | Status: SHIPPED | OUTPATIENT
Start: 2019-05-13 | End: 2019-07-30 | Stop reason: SDUPTHER

## 2019-05-13 NOTE — TELEPHONE ENCOUNTER
Faxed to office. Health Maintenance   Topic Date Due    Pneumococcal 0-64 years Vaccine (1 of 1 - PPSV23) 01/14/1981    Hepatitis B Vaccine (1 of 3 - Risk 3-dose series) 01/14/1994    Diabetic microalbuminuria test  09/29/2018    Lipid screen  09/29/2018    Potassium monitoring  09/29/2018    Creatinine monitoring  09/29/2018    Diabetic retinal exam  01/29/2020 (Originally 1/14/1985)    DTaP/Tdap/Td vaccine (1 - Tdap) 01/29/2020 (Originally 1/14/1994)    HIV screen  01/29/2020 (Originally 1/14/1990)    Flu vaccine (Season Ended) 09/01/2019    Diabetic foot exam  01/29/2020    A1C test (Diabetic or Prediabetic)  01/29/2020             (applicable per patient's age: Cancer Screenings, Depression Screening, Fall Risk Screening, Immunizations)    Hemoglobin A1C (%)   Date Value   01/29/2019 5.7   11/29/2017 6.2   09/29/2017 7.1 (H)     Microalb/Crt.  Ratio (mcg/mg creat)   Date Value   09/29/2017 9     LDL Cholesterol (mg/dL)   Date Value   09/29/2017 138 (H)     AST (U/L)   Date Value   09/29/2017 23     ALT (U/L)   Date Value   09/29/2017 28     BUN (mg/dL)   Date Value   09/29/2017 19      (goal A1C is < 7)   (goal LDL is <100) need 30-50% reduction from baseline     BP Readings from Last 3 Encounters:   02/12/19 129/71   01/29/19 (!) 117/96   07/10/18 (!) 135/95    (goal /80)      All Future Testing planned in [facility]:  Lab Frequency Next Occurrence   Basic Metabolic Panel Once 02/10/3250   Hemoglobin A1C Once 07/28/2019   Lipid Panel Once 07/28/2019   Microalbumin, Ur Once 07/28/2019   AST Once 07/29/2019   ALT Once 07/29/2019       Next Visit Date:  Future Appointments   Date Time Provider Alysa Tracey   7/30/2019  9:00 AM Waqar Teague, APRN - CNP [facility]            Patient Active Problem List:     Bilateral hand numbness     Controlled type 2 diabetes mellitus with diabetic polyneuropathy, without long-term current use of insulin (HCC)     Elevated blood pressure reading Dyslipidemia     Essential hypertension

## 2019-05-28 DIAGNOSIS — I10 ESSENTIAL HYPERTENSION: ICD-10-CM

## 2019-05-28 RX ORDER — LOSARTAN POTASSIUM AND HYDROCHLOROTHIAZIDE 12.5; 5 MG/1; MG/1
TABLET ORAL
Qty: 90 TABLET | Refills: 3 | Status: SHIPPED | OUTPATIENT
Start: 2019-05-28 | End: 2019-06-04 | Stop reason: SDUPTHER

## 2019-05-28 NOTE — TELEPHONE ENCOUNTER
Health Maintenance   Topic Date Due    Pneumococcal 0-64 years Vaccine (1 of 1 - PPSV23) 01/14/1981    Hepatitis B Vaccine (1 of 3 - Risk 3-dose series) 01/14/1994    Diabetic microalbuminuria test  09/29/2018    Lipid screen  09/29/2018    Potassium monitoring  09/29/2018    Creatinine monitoring  09/29/2018    Diabetic retinal exam  01/29/2020 (Originally 1/14/1985)    DTaP/Tdap/Td vaccine (1 - Tdap) 01/29/2020 (Originally 1/14/1994)    HIV screen  01/29/2020 (Originally 1/14/1990)    Flu vaccine (Season Ended) 09/01/2019    Diabetic foot exam  01/29/2020    A1C test (Diabetic or Prediabetic)  01/29/2020             (applicable per patient's age: Cancer Screenings, Depression Screening, Fall Risk Screening, Immunizations)    Hemoglobin A1C (%)   Date Value   01/29/2019 5.7   11/29/2017 6.2   09/29/2017 7.1 (H)     Microalb/Crt.  Ratio (mcg/mg creat)   Date Value   09/29/2017 9     LDL Cholesterol (mg/dL)   Date Value   09/29/2017 138 (H)     AST (U/L)   Date Value   09/29/2017 23     ALT (U/L)   Date Value   09/29/2017 28     BUN (mg/dL)   Date Value   09/29/2017 19      (goal A1C is < 7)   (goal LDL is <100) need 30-50% reduction from baseline     BP Readings from Last 3 Encounters:   02/12/19 129/71   01/29/19 (!) 117/96   07/10/18 (!) 135/95    (goal /80)      All Future Testing planned in [facility]:  Lab Frequency Next Occurrence   Basic Metabolic Panel Once 98/68/2097   Hemoglobin A1C Once 07/28/2019   Lipid Panel Once 07/28/2019   Microalbumin, Ur Once 07/28/2019   AST Once 07/29/2019   ALT Once 07/29/2019       Next Visit Date:  Future Appointments   Date Time Provider Alysa Tracey   7/30/2019  9:00 AM ARNOLD Salvador - CNP [facility]            Patient Active Problem List:     Bilateral hand numbness     Controlled type 2 diabetes mellitus with diabetic polyneuropathy, without long-term current use of insulin (HCC)     Elevated blood pressure reading     Dyslipidemia Essential hypertension

## 2019-06-04 DIAGNOSIS — I10 ESSENTIAL HYPERTENSION: ICD-10-CM

## 2019-06-04 RX ORDER — LOSARTAN POTASSIUM AND HYDROCHLOROTHIAZIDE 12.5; 5 MG/1; MG/1
TABLET ORAL
Qty: 90 TABLET | Refills: 3 | Status: SHIPPED | OUTPATIENT
Start: 2019-06-04 | End: 2019-12-23 | Stop reason: RX

## 2019-06-04 NOTE — TELEPHONE ENCOUNTER
Health Maintenance   Topic Date Due    Pneumococcal 0-64 years Vaccine (1 of 1 - PPSV23) 01/14/1981    Hepatitis B Vaccine (1 of 3 - Risk 3-dose series) 01/14/1994    Diabetic microalbuminuria test  09/29/2018    Lipid screen  09/29/2018    Potassium monitoring  09/29/2018    Creatinine monitoring  09/29/2018    Diabetic retinal exam  01/29/2020 (Originally 1/14/1985)    DTaP/Tdap/Td vaccine (1 - Tdap) 01/29/2020 (Originally 1/14/1994)    HIV screen  01/29/2020 (Originally 1/14/1990)    Flu vaccine (Season Ended) 09/01/2019    Diabetic foot exam  01/29/2020    A1C test (Diabetic or Prediabetic)  01/29/2020             (applicable per patient's age: Cancer Screenings, Depression Screening, Fall Risk Screening, Immunizations)    Hemoglobin A1C (%)   Date Value   01/29/2019 5.7   11/29/2017 6.2   09/29/2017 7.1 (H)     Microalb/Crt.  Ratio (mcg/mg creat)   Date Value   09/29/2017 9     LDL Cholesterol (mg/dL)   Date Value   09/29/2017 138 (H)     AST (U/L)   Date Value   09/29/2017 23     ALT (U/L)   Date Value   09/29/2017 28     BUN (mg/dL)   Date Value   09/29/2017 19      (goal A1C is < 7)   (goal LDL is <100) need 30-50% reduction from baseline     BP Readings from Last 3 Encounters:   02/12/19 129/71   01/29/19 (!) 117/96   07/10/18 (!) 135/95    (goal /80)      All Future Testing planned in [facility]:  Lab Frequency Next Occurrence   Basic Metabolic Panel Once 44/61/1766   Hemoglobin A1C Once 07/28/2019   Lipid Panel Once 07/28/2019   Microalbumin, Ur Once 07/28/2019   AST Once 07/29/2019   ALT Once 07/29/2019       Next Visit Date:  Future Appointments   Date Time Provider Alysa Tracey   7/30/2019  9:00 AM Jerel Teague APRN - CNP [facility]            Patient Active Problem List:     Bilateral hand numbness     Controlled type 2 diabetes mellitus with diabetic polyneuropathy, without long-term current use of insulin (HCC)     Elevated blood pressure reading     Dyslipidemia Essential hypertension

## 2019-07-24 ENCOUNTER — TELEPHONE (OUTPATIENT)
Dept: PRIMARY CARE CLINIC | Age: 44
End: 2019-07-24

## 2019-07-29 ENCOUNTER — HOSPITAL ENCOUNTER (OUTPATIENT)
Age: 44
Discharge: HOME | End: 2019-07-29
Payer: COMMERCIAL

## 2019-07-29 DIAGNOSIS — E78.5 DYSLIPIDEMIA: ICD-10-CM

## 2019-07-29 DIAGNOSIS — E11.42 CONTROLLED TYPE 2 DIABETES MELLITUS WITH DIABETIC POLYNEUROPATHY, WITHOUT LONG-TERM CURRENT USE OF INSULIN (HCC): ICD-10-CM

## 2019-07-29 LAB
ALT SERPL-CCNC: 15 U/L (ref 5–41)
ANION GAP SERPL CALCULATED.3IONS-SCNC: 16 MMOL/L (ref 9–17)
AST SERPL-CCNC: 24 U/L
BUN BLDV-MCNC: 16 MG/DL (ref 6–20)
BUN/CREAT BLD: 15 (ref 9–20)
CALCIUM SERPL-MCNC: 10.5 MG/DL (ref 8.6–10.4)
CHLORIDE BLD-SCNC: 96 MMOL/L (ref 98–107)
CHOLESTEROL/HDL RATIO: 5.3
CHOLESTEROL: 202 MG/DL
CO2: 30 MMOL/L (ref 20–31)
CREAT SERPL-MCNC: 1.08 MG/DL (ref 0.7–1.2)
CREATININE URINE: 205.1 MG/DL (ref 39–259)
ESTIMATED AVERAGE GLUCOSE: 103 MG/DL
GFR AFRICAN AMERICAN: >60 ML/MIN
GFR NON-AFRICAN AMERICAN: >60 ML/MIN
GFR SERPL CREATININE-BSD FRML MDRD: ABNORMAL ML/MIN/{1.73_M2}
GFR SERPL CREATININE-BSD FRML MDRD: ABNORMAL ML/MIN/{1.73_M2}
GLUCOSE BLD-MCNC: 90 MG/DL (ref 70–99)
HBA1C MFR BLD: 5.2 % (ref 4.8–5.9)
HDLC SERPL-MCNC: 38 MG/DL
LDL CHOLESTEROL: 123 MG/DL (ref 0–130)
MICROALBUMIN/CREAT 24H UR: <12 MG/L
MICROALBUMIN/CREAT UR-RTO: NORMAL MCG/MG CREAT
POTASSIUM SERPL-SCNC: 4.5 MMOL/L (ref 3.7–5.3)
SODIUM BLD-SCNC: 142 MMOL/L (ref 135–144)
TRIGL SERPL-MCNC: 206 MG/DL
VLDLC SERPL CALC-MCNC: ABNORMAL MG/DL (ref 1–30)

## 2019-07-29 PROCEDURE — 83036 HEMOGLOBIN GLYCOSYLATED A1C: CPT

## 2019-07-29 PROCEDURE — 84460 ALANINE AMINO (ALT) (SGPT): CPT

## 2019-07-29 PROCEDURE — 82570 ASSAY OF URINE CREATININE: CPT

## 2019-07-29 PROCEDURE — 84450 TRANSFERASE (AST) (SGOT): CPT

## 2019-07-29 PROCEDURE — 82043 UR ALBUMIN QUANTITATIVE: CPT

## 2019-07-29 PROCEDURE — 80048 BASIC METABOLIC PNL TOTAL CA: CPT

## 2019-07-29 PROCEDURE — 80061 LIPID PANEL: CPT

## 2019-07-29 PROCEDURE — 36415 COLL VENOUS BLD VENIPUNCTURE: CPT

## 2019-07-30 ENCOUNTER — OFFICE VISIT (OUTPATIENT)
Dept: PRIMARY CARE CLINIC | Age: 44
End: 2019-07-30
Payer: COMMERCIAL

## 2019-07-30 VITALS
BODY MASS INDEX: 31.12 KG/M2 | RESPIRATION RATE: 14 BRPM | HEIGHT: 67 IN | HEART RATE: 106 BPM | TEMPERATURE: 98.7 F | DIASTOLIC BLOOD PRESSURE: 75 MMHG | WEIGHT: 198.3 LBS | SYSTOLIC BLOOD PRESSURE: 137 MMHG

## 2019-07-30 DIAGNOSIS — I10 ESSENTIAL HYPERTENSION: ICD-10-CM

## 2019-07-30 DIAGNOSIS — E11.42 CONTROLLED TYPE 2 DIABETES MELLITUS WITH DIABETIC POLYNEUROPATHY, WITHOUT LONG-TERM CURRENT USE OF INSULIN (HCC): Primary | ICD-10-CM

## 2019-07-30 DIAGNOSIS — E78.5 DYSLIPIDEMIA: ICD-10-CM

## 2019-07-30 PROCEDURE — 99214 OFFICE O/P EST MOD 30 MIN: CPT | Performed by: NURSE PRACTITIONER

## 2019-07-30 RX ORDER — ATORVASTATIN CALCIUM 40 MG/1
40 TABLET, FILM COATED ORAL DAILY
Qty: 90 TABLET | Refills: 3 | Status: SHIPPED | OUTPATIENT
Start: 2019-07-30 | End: 2020-04-06 | Stop reason: SDUPTHER

## 2019-07-30 ASSESSMENT — ENCOUNTER SYMPTOMS
WHEEZING: 0
CONSTIPATION: 0
SORE THROAT: 0
VOMITING: 0
RHINORRHEA: 0
COUGH: 0
SHORTNESS OF BREATH: 0
ABDOMINAL PAIN: 0
DIARRHEA: 0
NAUSEA: 0

## 2019-07-30 NOTE — PATIENT INSTRUCTIONS
sugar levels. A registered dietitian or certified diabetes educator can help you plan how many carbs to include in each meal and snack. For most adults, a guideline for the daily amount of carbs is:  · 45 to 60 grams at each meal. That's about the same as 3 to 4 carbohydrate servings. · 15 to 20 grams at each snack. That's about the same as 1 carbohydrate serving. Count carbs  Counting carbs lets you know how much rapid-acting insulin to take before you eat. If you use an insulin pump, you get a constant rate of insulin during the day. So the pump must be programmed at meals. This gives you extra insulin to cover the rise in blood sugar after meals. If you take insulin:  · Learn your own insulin-to-carb ratio. You and your diabetes health professional will figure out the ratio. You can do this by testing your blood sugar after meals. For example, you may need a certain amount of insulin for every 15 grams of carbs. · Add up the carb grams in a meal. Then you can figure out how many units of insulin to take based on your insulin-to-carb ratio. · Exercise lowers blood sugar. You can use less insulin than you would if you were not doing exercise. Keep in mind that timing matters. If you exercise within 1 hour after a meal, your body may need less insulin for that meal than it would if you exercised 3 hours after the meal. Test your blood sugar to find out how exercise affects your need for insulin. If you do or don't take insulin:  · Look at labels on packaged foods. This can tell you how many carbs are in a serving. You can also use guides from the American Diabetes Association. · Be aware of portions, or serving sizes. If a package has two servings and you eat the whole package, you need to double the number of grams of carbohydrate listed for one serving. · Protein, fat, and fiber do not raise blood sugar as much as carbs do.  If you eat a lot of these nutrients in a meal, your blood sugar will rise more

## 2019-12-23 RX ORDER — LOSARTAN POTASSIUM 50 MG/1
50 TABLET ORAL DAILY
Qty: 90 TABLET | Refills: 1 | Status: SHIPPED | OUTPATIENT
Start: 2019-12-23 | End: 2020-02-04 | Stop reason: SDUPTHER

## 2019-12-23 RX ORDER — HYDROCHLOROTHIAZIDE 12.5 MG/1
12.5 CAPSULE, GELATIN COATED ORAL EVERY MORNING
Qty: 90 CAPSULE | Refills: 1 | Status: SHIPPED | OUTPATIENT
Start: 2019-12-23 | End: 2020-03-04 | Stop reason: SDUPTHER

## 2020-01-28 ENCOUNTER — TELEPHONE (OUTPATIENT)
Dept: PRIMARY CARE CLINIC | Age: 45
End: 2020-01-28

## 2020-02-03 ENCOUNTER — HOSPITAL ENCOUNTER (OUTPATIENT)
Age: 45
Discharge: HOME | End: 2020-02-03
Payer: COMMERCIAL

## 2020-02-03 LAB
ABSOLUTE EOS #: 0.07 K/UL (ref 0–0.44)
ABSOLUTE IMMATURE GRANULOCYTE: 0.03 K/UL (ref 0–0.3)
ABSOLUTE LYMPH #: 2.66 K/UL (ref 1.1–3.7)
ABSOLUTE MONO #: 1.06 K/UL (ref 0.1–1.2)
ALT SERPL-CCNC: 19 U/L (ref 5–41)
ANION GAP SERPL CALCULATED.3IONS-SCNC: 13 MMOL/L (ref 9–17)
AST SERPL-CCNC: 23 U/L
BASOPHILS # BLD: 1 % (ref 0–2)
BASOPHILS ABSOLUTE: 0.05 K/UL (ref 0–0.2)
BUN BLDV-MCNC: 22 MG/DL (ref 6–20)
BUN/CREAT BLD: 27 (ref 9–20)
CALCIUM SERPL-MCNC: 9.8 MG/DL (ref 8.6–10.4)
CHLORIDE BLD-SCNC: 101 MMOL/L (ref 98–107)
CHOLESTEROL/HDL RATIO: 2.5
CHOLESTEROL: 143 MG/DL
CO2: 24 MMOL/L (ref 20–31)
CREAT SERPL-MCNC: 0.82 MG/DL (ref 0.7–1.2)
DIFFERENTIAL TYPE: ABNORMAL
EOSINOPHILS RELATIVE PERCENT: 1 % (ref 1–4)
ESTIMATED AVERAGE GLUCOSE: 108 MG/DL
GFR AFRICAN AMERICAN: >60 ML/MIN
GFR NON-AFRICAN AMERICAN: >60 ML/MIN
GFR SERPL CREATININE-BSD FRML MDRD: ABNORMAL ML/MIN/{1.73_M2}
GFR SERPL CREATININE-BSD FRML MDRD: ABNORMAL ML/MIN/{1.73_M2}
GLUCOSE BLD-MCNC: 96 MG/DL (ref 70–99)
HBA1C MFR BLD: 5.4 % (ref 4.8–5.9)
HCT VFR BLD CALC: 44.6 % (ref 40.7–50.3)
HDLC SERPL-MCNC: 57 MG/DL
HEMOGLOBIN: 14.5 G/DL (ref 13–17)
IMMATURE GRANULOCYTES: 0 %
LDL CHOLESTEROL: 74 MG/DL (ref 0–130)
LYMPHOCYTES # BLD: 35 % (ref 24–43)
MCH RBC QN AUTO: 28.7 PG (ref 25.2–33.5)
MCHC RBC AUTO-ENTMCNC: 32.5 G/DL (ref 28.4–34.8)
MCV RBC AUTO: 88.1 FL (ref 82.6–102.9)
MONOCYTES # BLD: 14 % (ref 3–12)
NRBC AUTOMATED: 0 PER 100 WBC
PDW BLD-RTO: 13.2 % (ref 11.8–14.4)
PLATELET # BLD: 269 K/UL (ref 138–453)
PLATELET ESTIMATE: ABNORMAL
PMV BLD AUTO: 9.7 FL (ref 8.1–13.5)
POTASSIUM SERPL-SCNC: 4.3 MMOL/L (ref 3.7–5.3)
RBC # BLD: 5.06 M/UL (ref 4.21–5.77)
RBC # BLD: ABNORMAL 10*6/UL
SEG NEUTROPHILS: 49 % (ref 36–65)
SEGMENTED NEUTROPHILS ABSOLUTE COUNT: 3.78 K/UL (ref 1.5–8.1)
SODIUM BLD-SCNC: 138 MMOL/L (ref 135–144)
TRIGL SERPL-MCNC: 59 MG/DL
VLDLC SERPL CALC-MCNC: NORMAL MG/DL (ref 1–30)
WBC # BLD: 7.7 K/UL (ref 3.5–11.3)
WBC # BLD: ABNORMAL 10*3/UL

## 2020-02-03 PROCEDURE — 84460 ALANINE AMINO (ALT) (SGPT): CPT

## 2020-02-03 PROCEDURE — 83036 HEMOGLOBIN GLYCOSYLATED A1C: CPT

## 2020-02-03 PROCEDURE — 80048 BASIC METABOLIC PNL TOTAL CA: CPT

## 2020-02-03 PROCEDURE — 36415 COLL VENOUS BLD VENIPUNCTURE: CPT

## 2020-02-03 PROCEDURE — 85025 COMPLETE CBC W/AUTO DIFF WBC: CPT

## 2020-02-03 PROCEDURE — 80061 LIPID PANEL: CPT

## 2020-02-03 PROCEDURE — 84450 TRANSFERASE (AST) (SGOT): CPT

## 2020-02-04 ENCOUNTER — OFFICE VISIT (OUTPATIENT)
Dept: PRIMARY CARE CLINIC | Age: 45
End: 2020-02-04
Payer: COMMERCIAL

## 2020-02-04 VITALS
BODY MASS INDEX: 32.55 KG/M2 | RESPIRATION RATE: 14 BRPM | DIASTOLIC BLOOD PRESSURE: 84 MMHG | HEART RATE: 68 BPM | WEIGHT: 207.4 LBS | TEMPERATURE: 97.9 F | HEIGHT: 67 IN | SYSTOLIC BLOOD PRESSURE: 124 MMHG

## 2020-02-04 PROCEDURE — 99214 OFFICE O/P EST MOD 30 MIN: CPT | Performed by: NURSE PRACTITIONER

## 2020-02-04 RX ORDER — LOSARTAN POTASSIUM 100 MG/1
100 TABLET ORAL DAILY
Qty: 90 TABLET | Refills: 3 | Status: SHIPPED | OUTPATIENT
Start: 2020-02-04 | End: 2020-04-06 | Stop reason: SDUPTHER

## 2020-02-04 RX ORDER — SILDENAFIL 50 MG/1
50 TABLET, FILM COATED ORAL PRN
Qty: 30 TABLET | Refills: 3 | Status: SHIPPED | OUTPATIENT
Start: 2020-02-04 | End: 2020-04-06 | Stop reason: SDUPTHER

## 2020-02-04 ASSESSMENT — ENCOUNTER SYMPTOMS
SORE THROAT: 0
SHORTNESS OF BREATH: 0
CONSTIPATION: 0
COUGH: 0
ABDOMINAL PAIN: 0
WHEEZING: 0
VOMITING: 0
RHINORRHEA: 0
DIARRHEA: 0
NAUSEA: 0

## 2020-02-04 ASSESSMENT — PATIENT HEALTH QUESTIONNAIRE - PHQ9
2. FEELING DOWN, DEPRESSED OR HOPELESS: 0
SUM OF ALL RESPONSES TO PHQ9 QUESTIONS 1 & 2: 0
1. LITTLE INTEREST OR PLEASURE IN DOING THINGS: 0
SUM OF ALL RESPONSES TO PHQ QUESTIONS 1-9: 0
SUM OF ALL RESPONSES TO PHQ QUESTIONS 1-9: 0

## 2020-02-04 NOTE — PATIENT INSTRUCTIONS
SURVEY:    You may be receiving a survey from [employer] regarding your visit today. Please complete the survey to enable us to provide the highest quality of care to you and your family. If you cannot score us a very good on any question, please call the office to discuss how we could have made your experience a very good one. Thank you. Patient Education        Diabetes Foot Health: Care Instructions  Your Care Instructions    When you have diabetes, your feet need extra care and attention. Diabetes can damage the nerve endings and blood vessels in your feet, making you less likely to notice when your feet are injured. Diabetes also limits your body's ability to fight infection and get blood to areas that need it. If you get a minor foot injury, it could become an ulcer or a serious infection. With good foot care, you can prevent most of these problems. Caring for your feet can be quick and easy. Most of the care can be done when you are bathing or getting ready for bed. Follow-up care is a key part of your treatment and safety. Be sure to make and go to all appointments, and call your doctor if you are having problems. It's also a good idea to know your test results and keep a list of the medicines you take. How can you care for yourself at home? · Keep your blood sugar close to normal by watching what and how much you eat, monitoring blood sugar, taking medicines if prescribed, and getting regular exercise. · Do not smoke. Smoking affects blood flow and can make foot problems worse. If you need help quitting, talk to your doctor about stop-smoking programs and medicines. These can increase your chances of quitting for good. · Eat a diet that is low in fats. High fat intake can cause fat to build up in your blood vessels and decrease blood flow. · Inspect your feet daily for blisters, cuts, cracks, or sores. If you cannot see well, use a mirror or have someone help you.   · Take care of your

## 2020-03-04 ENCOUNTER — OFFICE VISIT (OUTPATIENT)
Dept: NEUROLOGY | Age: 45
End: 2020-03-04
Payer: COMMERCIAL

## 2020-03-04 VITALS
BODY MASS INDEX: 34.72 KG/M2 | SYSTOLIC BLOOD PRESSURE: 138 MMHG | DIASTOLIC BLOOD PRESSURE: 86 MMHG | HEIGHT: 67 IN | WEIGHT: 221.2 LBS

## 2020-03-04 PROBLEM — R29.898 WEAKNESS OF BOTH HANDS: Status: ACTIVE | Noted: 2020-03-04

## 2020-03-04 PROBLEM — M62.541 ATROPHY OF MUSCLE OF BOTH HANDS: Status: ACTIVE | Noted: 2020-03-04

## 2020-03-04 PROBLEM — M62.542 ATROPHY OF MUSCLE OF BOTH HANDS: Status: ACTIVE | Noted: 2020-03-04

## 2020-03-04 PROCEDURE — 99204 OFFICE O/P NEW MOD 45 MIN: CPT | Performed by: NURSE PRACTITIONER

## 2020-03-04 RX ORDER — LOSARTAN POTASSIUM AND HYDROCHLOROTHIAZIDE 12.5; 5 MG/1; MG/1
1 TABLET ORAL DAILY
COMMUNITY
End: 2021-07-01 | Stop reason: ALTCHOICE

## 2020-03-04 NOTE — PROGRESS NOTES
[facility]            [address]          [place], [address] [facility]          Dept: [phone]          Dept Fax: [phone]        MD Lester Lazo MD Ahmed B. Nelson Culver, MD Carmela Plumb, MD Dionisio Peng, MD Cherylle Boxer, KATE         New Patient Consultation    3/4/2020    HISTORY OF PRESENT ILLNESS:       I had the pleasure of seeing Waldo Bellamy for evaluation of bilateral hand weakness. The patient is a 80-year-old man who developed weakness of his bilateral upper extremities more so on the left than the right in early 2017. He had been working as a  and was having difficulty performing his job. The patient had EMG/NCV study showing a moderate right, and mild left median neuropathy at the wrist and probably an early ulnar neuropathy bilaterally. In 2017, the patient underwent bilateral carpal tunnel release without any improvement in his strength in his hands. He did undergo occupational therapy again, without any significant improvement. In late 2017's, the patient was seen by neurosurgery at [facility], who then proceeded with bilateral ulnar nerve transposition's. Again, the patient had no relief of his weakness of his bilateral upper extremity. The patient does have a history of diabetes mellitus as well as hypertension and dyslipidemia. His diabetes is under adequate control with a hemoglobin A1c of 5.4 per patient report. The patient continues now with decreased  strength in his hands bilaterally. Decreased dorsiflexion of his wrists bilaterally and decreased flexion of his wrist bilaterally. He is unable to right because he is unable to hold a pen and he uses one finger to use his cell phone. He is currently on Social Security disability due to the inability to use his hands.   There is no family history of a neuropathy or other neurologic problems in the past.  His father is with him on his visit today. There is no weakness of his bilateral lower extremities, but there is diminished sensation bilaterally, but to a lesser degree than in his hands. Patient denies any difficulty swallowing or chewing. He denies any blurred or double vision. He denies any muscle fasciculations or twitches. He denies any bowel or bladder difficulties. Prior testing reviewed:  -EMG 5/12/17-electrodiagnostic evidence of a moderate right, both sensory and motor changes present, median neuropathy at the wrist.  Electrodiagnostic evidence of a mild left sensory changes only median neuropathy at the wrist.  Electrodiagnostic evidence of conduction velocity slowing across the left and right elbow segments of 6 m/s without loss in amplitude.   This does not reach the electrodiagnostic criteria for conduction velocity slowing but likely resents early left and right ulnar neuropathy at the elbow there is no evidence of a left or right cervical radiculopathy, myelopathy, or other neuropathy of the muscles and nerves tested            PAST MEDICAL HISTORY:         Diagnosis Date    Hyperlipidemia     Hypertension     Pre-diabetes         PAST SURGICAL HISTORY:         Procedure Laterality Date    CARPAL TUNNEL RELEASE Left 06/26/2017    Dr. Chris Anton Bilateral     HERNIA REPAIR Left     [facility]    [place] REVISE MEDIAN N/CARPAL TUNNEL SURG Left 6/26/2017    CARPAL TUNNEL RELEASE performed by Clement Moore MD at [address]/CARPAL TUNNEL SURG Right 7/17/2017    CARPAL TUNNEL RELEASE performed by Clement Moore MD at [address]:     Social History     Socioeconomic History    Marital status: Single     Spouse name: Not on file    Number of children: Not on file    Years of education: Not on file    Highest education level: Not on file   Occupational History    Not on file   Social Needs    Financial resource strain: Not on file    Food insecurity:     Worry: Not on file     Inability: Not on file    Transportation needs:     Medical: Not on file     Non-medical: Not on file   Tobacco Use    Smoking status: Never Smoker    Smokeless tobacco: Never Used   Substance and Sexual Activity    Alcohol use: No    Drug use: No    Sexual activity: Not on file   Lifestyle    Physical activity:     Days per week: Not on file     Minutes per session: Not on file    Stress: Not on file   Relationships    Social connections:     Talks on phone: Not on file     Gets together: Not on file     Attends Yazdanism service: Not on file     Active member of club or organization: Not on file     Attends meetings of clubs or organizations: Not on file     Relationship status: Not on file    Intimate partner violence:     Fear of current or ex partner: Not on file     Emotionally abused: Not on file     Physically abused: Not on file     Forced sexual activity: Not on file   Other Topics Concern    Not on file   Social History Narrative    Not on file       CURRENT MEDICATIONS:     Current Outpatient Medications   Medication Sig Dispense Refill    losartan (COZAAR) 100 MG tablet Take 1 tablet by mouth daily 90 tablet 3    sildenafil (VIAGRA) 50 MG tablet Take 1 tablet by mouth as needed for Erectile Dysfunction 30 tablet 3    hydrochlorothiazide (MICROZIDE) 12.5 MG capsule Take 1 capsule by mouth every morning 90 capsule 1    atorvastatin (LIPITOR) 40 MG tablet Take 1 tablet by mouth daily 90 tablet 3    sitaGLIPtan-metformin (JANUMET)  MG per tablet Take 1 tablet by mouth 2 times daily (with meals) 180 tablet 3     No current facility-administered medications for this visit.          ALLERGIES:   No Known Allergies                       REVIEW OF SYSTEMS    CONSTITUTIONAL Weight: absent, Appetite: absent, Fatigue: absent      HEENT Ears: normal, Visual disturbance: absent   RESPIRATORY Shortness of breath: absent, Cough: absent CARDIOVASCULAR Chest pain: absent, Leg swelling :absent      GI Constipation: absent, Diarrhea: absent, Swallowing change: absent       Urinary frequency: absent, Urinary urgency: absent,    MUSCULOSKELETAL Neck pain: absent, Back pain: absent, Stiffness: present, Muscle pain: present, Joint pain: absent Restless legs: absent   DERMATOLOGIC Hair loss: absent, Skin changes: absent   NEUROLOGIC Memory loss: absent, Confusion: absent, Seizures: absent Trouble walking or imbalance: present, Dizziness: absent, Weakness: present, Numbness: present Tremor: absent, Spasm: present, Speech difficulty: absent, Headache: absent, Light sensitivity: absent   PSYCHIATRIC Anxiety: absent, Hallucination: absent, Mood disorder: absent   HEMATOLOGIC Abnormal bleeding: absent, Anemia: absent,            PHYSICAL EXAMINATION:       There were no vitals filed for this visit. .                                                                                                    General Appearance:  Alert, cooperative, no signs of distress, appears stated age   Head:  Normocephalic, no signs of trauma   Eyes:  Conjunctiva/corneas clear;  eyelids intact   Ears:  Normal external ear and canals   Nose: Nares normal, mucosa normal, no drainage    Throat: Lips and tongue normal; teeth normal;  gums normal   Neck: Supple, intact flexion, extension and rotation;   trachea midline;  no adenopathy;   thyroid: not enlarged;   no carotid pulse abnormality   Back:   Symmetric, no curvature, ROM adequate   Lungs:   Respirations unlabored   Heart:  Regular rate and rhythm           Extremities: Extremities normal, no cyanosis, no edema   Pulses: Symmetric over head and neck   Skin: Skin color, texture normal, no rashes, no lesions                                     NEUROLOGIC EXAMINATION    Neurologic Exam     Mental Status   Oriented to person, place, and time.    Level of consciousness: alert    Cranial Nerves   Cranial nerves II through XII intact.      Motor Exam   Muscle bulk: decreased (in hands bilaterally)  Overall muscle tone: normal  Right arm tone: decreased  Left arm tone: decreased  Right arm pronator drift: absent  Left arm pronator drift: absent  Right leg tone: normal  Left leg tone: normal    Strength   Right neck flexion: 5/5  Left neck flexion: 5/5  Right neck extension: 5/5  Right deltoid: 5/5  Left deltoid: 5/5  Right biceps: 5/5  Left biceps: 5/5  Right triceps: 5/5  Left triceps: 5/5  Right wrist flexion: 3/5  Left wrist flexion: 3/5  Right wrist extension: 2/5  Left wrist extension: 2/5  Right interossei: 25  Left interossei: 5  Right iliopsoas: 5  Left iliopsoas: 5  Right quadriceps: 55  Left quadriceps: 5  Right hamstrin/5  Left hamstrin/5  Right anterior tibial: 5  Left anterior tibial:   Right gastroc:   Left gastroc:     Sensory Exam   Right arm light touch: decreased from elbow  Left arm light touch: decreased from elbow  Right leg light touch: decreased from ankle  Left leg light touch: decreased from ankle  Right arm vibration: decreased from wrist  Left arm vibration: decreased from wrist  Right leg vibration: decreased from ankle  Left leg vibration: decreased from ankle  Right arm pinprick: decreased from elbow  Left arm pinprick: decreased from elbow  Right leg pinprick: decreased from ankle  Left leg pinprick: decreased from ankle    Gait, Coordination, and Reflexes     Gait  Gait: normal    Coordination   Romberg: negative  Finger to nose coordination: normal  Heel to shin coordination: normal    Tremor   Resting tremor: absent  Intention tremor: absent  Action tremor: absent    Reflexes   Right brachioradialis: 0  Left brachioradialis: 0  Right biceps: 0  Left biceps: 0  Right triceps: 0  Left triceps: 0  Right patellar: 0  Left patellar: 0  Right achilles: 0  Left achilles: 0  Right plantar: normal  Left plantar: normal  Left Singh: absent  Right

## 2020-03-04 NOTE — LETTER
March 4, 2020     [address] [facility], [facility]  [address] [facility] [address]    Patient: Maria Luz Adams  MR Number: [id number]  YOB: 1975  Date of Visit: 3/4/2020    Dear [address]:      [facility]            [place], [address] [facility], [address] [place] [address]          Dept: [phone]          Dept Fax: [phone]        MD Nick Cary MD Ahmed B. Sandie Clap, MD Suzie Ha, MD Anner Ginsberg, MD Evone Alcide, KATE         New Patient Consultation    3/4/2020    HISTORY OF PRESENT ILLNESS:       I had the pleasure of seeing Maria Luz Adams for evaluation of bilateral hand weakness. The patient is a 42-year-old man who developed weakness of his bilateral upper extremities more so on the left than the right in early 2017. He had been working as a  and was having difficulty performing his job. The patient had EMG/NCV study showing a moderate right, and mild left median neuropathy at the wrist and probably an early ulnar neuropathy bilaterally. In 2017, the patient underwent bilateral carpal tunnel release without any improvement in his strength in his hands. He did undergo occupational therapy again, without any significant improvement. In late 2017's, the patient was seen by neurosurgery at [facility], who then proceeded with bilateral ulnar nerve transposition's. Again, the patient had no relief of his weakness of his bilateral upper extremity. The patient does have a history of diabetes mellitus as well as hypertension and dyslipidemia. His diabetes is under adequate control with a hemoglobin A1c of 5.4 per patient report. The patient continues now with decreased  strength in his hands bilaterally.   Decreased dorsiflexion of his wrists bilaterally and decreased flexion of his wrist bilaterally. He is unable to right because he is unable to hold a pen and he uses one finger to use his cell phone. He is currently on Social Security disability due to the inability to use his hands. There is no family history of a neuropathy or other neurologic problems in the past.  His father is with him on his visit today. There is no weakness of his bilateral lower extremities, but there is diminished sensation bilaterally, but to a lesser degree than in his hands. Patient denies any difficulty swallowing or chewing. He denies any blurred or double vision. He denies any muscle fasciculations or twitches. He denies any bowel or bladder difficulties. Prior testing reviewed:  -EMG 5/12/17-electrodiagnostic evidence of a moderate right, both sensory and motor changes present, median neuropathy at the wrist.  Electrodiagnostic evidence of a mild left sensory changes only median neuropathy at the wrist.  Electrodiagnostic evidence of conduction velocity slowing across the left and right elbow segments of 6 m/s without loss in amplitude.   This does not reach the electrodiagnostic criteria for conduction velocity slowing but likely resents early left and right ulnar neuropathy at the elbow there is no evidence of a left or right cervical radiculopathy, myelopathy, or other neuropathy of the muscles and nerves tested            PAST MEDICAL HISTORY:         Diagnosis Date    Hyperlipidemia     Hypertension     Pre-diabetes         PAST SURGICAL HISTORY:         Procedure Laterality Date    CARPAL TUNNEL RELEASE Left 06/26/2017    Dr. Lilliana Rodríguez Bilateral     HERNIA REPAIR Left     [facility]    [place] REVISE MEDIAN N/CARPAL TUNNEL SURG Left 6/26/2017    CARPAL TUNNEL RELEASE performed by Lilibeth Parsons MD at [address] [facility] [address]/CARPAL TUNNEL SURG Right 7/17/2017    CARPAL TUNNEL RELEASE performed by Lilibeth Parsons MD at [address]:     Social History

## 2020-03-10 ENCOUNTER — OFFICE VISIT (OUTPATIENT)
Dept: NEUROLOGY | Age: 45
End: 2020-03-10
Payer: COMMERCIAL

## 2020-03-10 PROCEDURE — 95911 NRV CNDJ TEST 9-10 STUDIES: CPT | Performed by: PSYCHIATRY & NEUROLOGY

## 2020-03-10 PROCEDURE — 95886 MUSC TEST DONE W/N TEST COMP: CPT | Performed by: PSYCHIATRY & NEUROLOGY

## 2020-03-11 ENCOUNTER — HOSPITAL ENCOUNTER (OUTPATIENT)
Age: 45
Setting detail: SPECIMEN
Discharge: HOME | End: 2020-03-11
Payer: COMMERCIAL

## 2020-03-11 ENCOUNTER — HOSPITAL ENCOUNTER (OUTPATIENT)
Dept: GENERAL RADIOLOGY | Facility: CLINIC | Age: 45
Discharge: HOME | End: 2020-03-13
Payer: COMMERCIAL

## 2020-03-11 ENCOUNTER — HOSPITAL ENCOUNTER (OUTPATIENT)
Dept: MRI IMAGING | Facility: CLINIC | Age: 45
Discharge: HOME | End: 2020-03-13
Payer: COMMERCIAL

## 2020-03-11 PROCEDURE — 2580000003 HC RX 258: Performed by: NURSE PRACTITIONER

## 2020-03-11 PROCEDURE — 6360000004 HC RX CONTRAST MEDICATION: Performed by: NURSE PRACTITIONER

## 2020-03-11 PROCEDURE — A9579 GAD-BASE MR CONTRAST NOS,1ML: HCPCS | Performed by: NURSE PRACTITIONER

## 2020-03-11 PROCEDURE — 72156 MRI NECK SPINE W/O & W/DYE: CPT

## 2020-03-11 PROCEDURE — 70030 X-RAY EYE FOR FOREIGN BODY: CPT

## 2020-03-11 PROCEDURE — 70553 MRI BRAIN STEM W/O & W/DYE: CPT

## 2020-03-11 RX ORDER — SODIUM CHLORIDE 0.9 % (FLUSH) 0.9 %
10 SYRINGE (ML) INJECTION PRN
Status: DISCONTINUED | OUTPATIENT
Start: 2020-03-11 | End: 2020-03-14 | Stop reason: HOSPADM

## 2020-03-11 RX ADMIN — Medication 10 ML: at 10:53

## 2020-03-11 RX ADMIN — GADOTERIDOL 20 ML: 279.3 INJECTION, SOLUTION INTRAVENOUS at 10:53

## 2020-03-12 LAB — POC CREATININE: 0.7 MG/DL (ref 0.6–1.4)

## 2020-03-17 ENCOUNTER — TELEPHONE (OUTPATIENT)
Dept: NEUROLOGY | Age: 45
End: 2020-03-17

## 2020-03-17 NOTE — TELEPHONE ENCOUNTER
The patient was contacted regarding his EMG/MRI results. I advised that based on the EMG, Dr. Corrina Ballesteros was suspecting a hereditary neuropathy. We are going to order additional specialty testing.   He verbalized understanding

## 2020-03-24 ENCOUNTER — HOSPITAL ENCOUNTER (OUTPATIENT)
Age: 45
Discharge: HOME | End: 2020-03-24
Payer: COMMERCIAL

## 2020-03-24 LAB
FOLATE: 11.8 NG/ML
TSH SERPL DL<=0.05 MIU/L-ACNC: 2.17 MIU/L (ref 0.3–5)
VITAMIN B-12: 397 PG/ML (ref 232–1245)

## 2020-03-24 PROCEDURE — 86038 ANTINUCLEAR ANTIBODIES: CPT

## 2020-03-24 PROCEDURE — 36415 COLL VENOUS BLD VENIPUNCTURE: CPT

## 2020-03-24 PROCEDURE — 82746 ASSAY OF FOLIC ACID SERUM: CPT

## 2020-03-24 PROCEDURE — 84443 ASSAY THYROID STIM HORMONE: CPT

## 2020-03-24 PROCEDURE — 81448 HRDTRY PERPH NEURPHY PANEL: CPT

## 2020-03-24 PROCEDURE — 82607 VITAMIN B-12: CPT

## 2020-03-25 LAB — ANTI-NUCLEAR ANTIBODY (ANA): NEGATIVE

## 2020-04-06 RX ORDER — LOSARTAN POTASSIUM 100 MG/1
100 TABLET ORAL DAILY
Qty: 90 TABLET | Refills: 3 | Status: SHIPPED | OUTPATIENT
Start: 2020-04-06 | End: 2021-03-18

## 2020-04-06 RX ORDER — SILDENAFIL 50 MG/1
50 TABLET, FILM COATED ORAL PRN
Qty: 30 TABLET | Refills: 3 | Status: SHIPPED | OUTPATIENT
Start: 2020-04-06 | End: 2021-08-12 | Stop reason: SDUPTHER

## 2020-04-06 RX ORDER — SITAGLIPTIN AND METFORMIN HYDROCHLORIDE 1000; 50 MG/1; MG/1
1 TABLET, FILM COATED ORAL 2 TIMES DAILY WITH MEALS
Qty: 180 TABLET | Refills: 3 | Status: SHIPPED | OUTPATIENT
Start: 2020-04-06 | End: 2021-03-22

## 2020-04-06 RX ORDER — ATORVASTATIN CALCIUM 40 MG/1
40 TABLET, FILM COATED ORAL DAILY
Qty: 90 TABLET | Refills: 3 | Status: SHIPPED | OUTPATIENT
Start: 2020-04-06 | End: 2021-03-22

## 2020-05-12 LAB
SEND OUT REPORT: NORMAL
TEST NAME: NORMAL

## 2020-05-13 ENCOUNTER — OFFICE VISIT (OUTPATIENT)
Dept: NEUROLOGY | Age: 45
End: 2020-05-13
Payer: MEDICARE

## 2020-05-13 VITALS
DIASTOLIC BLOOD PRESSURE: 82 MMHG | SYSTOLIC BLOOD PRESSURE: 126 MMHG | WEIGHT: 228 LBS | TEMPERATURE: 97.1 F | HEIGHT: 67 IN | BODY MASS INDEX: 35.79 KG/M2

## 2020-05-13 PROBLEM — G62.9 NEUROPATHY: Status: ACTIVE | Noted: 2020-05-13

## 2020-05-13 PROCEDURE — 99214 OFFICE O/P EST MOD 30 MIN: CPT | Performed by: NURSE PRACTITIONER

## 2020-05-13 PROCEDURE — G8417 CALC BMI ABV UP PARAM F/U: HCPCS | Performed by: NURSE PRACTITIONER

## 2020-05-13 PROCEDURE — G8427 DOCREV CUR MEDS BY ELIG CLIN: HCPCS | Performed by: NURSE PRACTITIONER

## 2020-05-13 PROCEDURE — 1036F TOBACCO NON-USER: CPT | Performed by: NURSE PRACTITIONER

## 2020-05-13 NOTE — PROGRESS NOTES
[facility]            [address]          [place], [address] [facility]          Dept: [phone]          Dept Fax: [phone]        MD Dorothy Armenta MD Ahmed B. Waldon Hailstone, MD Rubbie Florida, MD Deeann Lesser, MD Gurvinder Manzano, CNP            5/13/2020      HISTORY OF PRESENT ILLNESS:       I had the pleasure of seeing Mohinder Beltran, who returns for continuing neurologic care. The patient is a 68-year-old man who was seen last on March 4, 2020 for an initial evaluation of bilateral upper extremity weakness. The patient symptoms began in January 2017. He was working as a  and having difficulty performing his job. He had an EMG/NCV study showing a moderate right and mild left median neuropathy at the wrist and probably an early ulnar neuropathy bilaterally. In 2017, the patient underwent bilateral carpal tunnel syndrome release without any improvement in the strength of his hands. He had a did undergo occupational therapy without significant improvement. In late 2017, the patient was seen by neurosurgery at [facility], who then proceeded with bilateral ulnar nerve transposition's. Again the patient had no relief of his weakness of his bilateral upper extremities. The patient does have a history of diabetes as well as hypertension and dyslipidemia. His last hemoglobin A1c was 5.4. The patient has decreased  strength bilaterally. He has decreased dorsiflexion's and flexion of his wrist bilaterally. He is unable to write because he is unable to hold a pen and he uses one finger to use his cell phone. He is currently on Social Security disability due to the inability to use his hands. There is no family history of neuropathy or other neurologic problems in the past.  His father was with him on his visit in March who denied any other family histories.   The patient does not have weakness of his bilateral lower extremities, but there is diminished sensation bilaterally. The patient was unable to feel vibration in all 4 extremities as well as light touch, pressure, and pinprick. The patient denies any bulbar symptoms. He denies blurred or double vision, muscle fasciculations or twitches in his extremities. He denies any bowel or bladder difficulties. Prior testing reviewed:    -EMG 3/10/20  Conclusion: This is an abnormal electrophysiological study indicative of generalized primarily demyelinating sensorimotor neuropathy. Lack of conduction block or temporal dispersion suggest genetic etiology. -EMG 5/12/17-electrodiagnostic evidence of a moderate right, both sensory and motor changes present, median neuropathy at the wrist.  Electrodiagnostic evidence of a mild left sensory changes only median neuropathy at the wrist.  Electrodiagnostic evidence of conduction velocity slowing across the left and right elbow segments of 6 m/s without loss in amplitude. This does not reach the electrodiagnostic criteria for conduction velocity slowing but likely resents early left and right ulnar neuropathy at the elbow there is no evidence of a left or right cervical radiculopathy, myelopathy, or other neuropathy of the muscles and nerves tested     -Labs -3/24/2020- normal B12, SERGIO, TSH. 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 Roxana Ludwig Bilateral     HERNIA REPAIR Left     UHR    NE REVISE MEDIAN N/CARPAL TUNNEL SURG Left 6/26/2017    CARPAL TUNNEL RELEASE performed by Jaja Jackson MD at [address] [place] [address] losartan-hydrochlorothiazide (HYZAAR) 50-12.5 MG per tablet Take 1 tablet by mouth daily       No current facility-administered medications for this visit. ALLERGIES:   No Known Allergies                              REVIEW OF SYSTEMS    CONSTITUTIONAL Weight: present, Appetite: absent, Fatigue: absent      HEENT Ears: normal, Visual disturbance: absent   RESPIRATORY Shortness of breath: absent, Cough: absent   CARDIOVASCULAR Chest pain: absent, Leg swelling :absent      GI Constipation: absent, Diarrhea: absent, Swallowing change: absent       Urinary frequency: absent, Urinary urgency: absent,   MUSCULOSKELETAL Neck pain: absent, Back pain: absent, Stiffness: present, Muscle pain: present, Joint pain: absent Restless legs: absent   DERMATOLOGIC Hair loss: absent, Skin changes: absent   NEUROLOGIC Memory loss: absent, Confusion: absent, Seizures: absent Trouble walking or imbalance: present, Dizziness: absent, Weakness: present, Numbness: present Tremor: absent, Spasm: present, Speech difficulty: absent, Headache: absent, Light sensitivity: absent   PSYCHIATRIC Anxiety: absent, Hallucination: absent, Mood disorder: absent   HEMATOLOGIC Abnormal bleeding: absent, Anemia: absent,            PHYSICAL EXAMINATION:       Vitals:    05/13/20 0813   BP: (!) 145/92   Temp: 97.1 °F (36.2 °C)                                              .                                                                                                     General Appearance:  Alert, cooperative, no signs of distress, appears stated age   Head:  Normocephalic, no signs of trauma   Eyes:  Conjunctiva/corneas clear;  eyelids intact   Ears:  Normal external ear and canals   Nose: Nares normal, mucosa normal, no drainage    Throat: Lips and tongue normal; teeth normal;  gums normal   Neck: Supple, intact flexion, extension and rotation;   trachea midline;  no adenopathy;   thyroid: not enlarged;   no carotid pulse abnormality   Back: Symmetric, no curvature, ROM adequate   Lungs:   Respirations unlabored   Heart:  Regular rate and rhythm           Extremities: Extremities normal, no cyanosis, no edema   Pulses: Symmetric over head and neck   Skin: Skin color, texture normal, no rashes, no lesions                                     NEUROLOGIC EXAMINATION    Mental Status   Oriented to person, place, and time.    Level of consciousness: alert     Cranial Nerves   Cranial nerves II through XII intact.      Motor Exam   Muscle bulk: decreased (in hands bilaterally)  Overall muscle tone: normal  Right arm tone: decreased  Left arm tone: decreased  Right arm pronator drift: absent  Left arm pronator drift: absent  Right leg tone: normal  Left leg tone: normal     Strength   Right neck flexion: 5/5  Left neck flexion: 5/5  Right neck extension: 5/5  Right deltoid: 5/5  Left deltoid: 5/5  Right biceps: 5/5  Left biceps: 5/5  Right triceps: 5/5  Left triceps: 5/5  Right wrist flexion: 3/5  Left wrist flexion: 3/5  Right wrist extension: 2/5  Left wrist extension: 2/5  Right interossei: 2/5  Left interossei: 2/5  Right iliopsoas: 5/5  Left iliopsoas: 5/5  Right quadriceps: 5/5  Left quadriceps: 5/5  Right hamstrin/5  Left hamstrin/5  Right anterior tibial: 5/5  Left anterior tibial: 5/5  Right gastroc: 5/5  Left gastroc: 5/5     Sensory Exam   Right arm light touch: decreased from elbow  Left arm light touch: decreased from elbow  Right leg light touch: decreased from ankle  Left leg light touch: decreased from ankle  Right arm vibration: decreased from shoulder  Left arm vibration: decreased from shoulder  Right leg vibration: absent  Left leg vibration: absent  Right arm pinprick: decreased from elbow  Left arm pinprick: decreased from elbow  Right leg pinprick: decreased from knee  Left leg pinprick: decreased from knee     Gait, Coordination, and Reflexes      Gait  Gait: normal     Coordination   Romberg: negative  Finger to nose coordination: normal  Heel to shin coordination: normal     Tremor   Resting tremor: absent  Intention tremor: absent  Action tremor: absent     Reflexes   Right brachioradialis: 0  Left brachioradialis: 0  Right biceps: 0  Left biceps: 0  Right triceps: 0  Left triceps: 0  Right patellar: 0  Left patellar: 0  Right achilles: 0  Left achilles: 0  Right plantar: normal  Left plantar: normal  Left Singh: absent  Right ankle clonus: absent  Left ankle clonus: absent           ASSESSMENT AND PLAN:           In summary, your patient, Mulugeta Duron exhibits the following, with associated plan:    Mr. Franci Welsh has had a progressive decrease in the strength of his bilateral upper extremities. Patient also displays atrophy in his upper extremities bilaterally as well as diffuse sensory loss in all 4 extremities. On EMG studies, the patient suffers from a severe sensorimotor neuropathy. Genetic studies were ordered showing the probability of a genetic disorder causing his neuropathy. The patient will be referred to a neuromuscular specialist, Dr. Maisha Xiong at the [address] [place] [address]. He will be referred to occupational therapy once again to identify adaptive equipment to help him to function more efficiently in his home.   Be seen back in 3 months for reevaluation            Signed: Fabiola Negron CNP      *Please note that portions of this note were completed with a voice recognition program.  Although every effort was made to insure the accuracy of this automated transcription, some errors in transcription may have occurred, occasionally words and are mis-transcribed

## 2020-05-28 ENCOUNTER — HOSPITAL ENCOUNTER (OUTPATIENT)
Dept: LAB | Age: 45
Discharge: HOME | End: 2020-05-28
Payer: MEDICARE

## 2020-05-28 PROCEDURE — 84166 PROTEIN E-PHORESIS/URINE/CSF: CPT

## 2020-05-28 PROCEDURE — 84156 ASSAY OF PROTEIN URINE: CPT

## 2020-06-01 LAB
P E INTERPRETATION, U: NORMAL
PATHOLOGIST: NORMAL
SPECIMEN TYPE: NORMAL
URINE TOTAL PROTEIN: 7 MG/DL

## 2020-07-24 ENCOUNTER — TELEPHONE (OUTPATIENT)
Dept: PRIMARY CARE CLINIC | Age: 45
End: 2020-07-24

## 2020-08-10 ENCOUNTER — TELEPHONE (OUTPATIENT)
Dept: PRIMARY CARE CLINIC | Age: 45
End: 2020-08-10

## 2020-09-04 ENCOUNTER — TELEPHONE (OUTPATIENT)
Dept: PRIMARY CARE CLINIC | Age: 45
End: 2020-09-04

## 2020-09-10 ENCOUNTER — HOSPITAL ENCOUNTER (OUTPATIENT)
Dept: LAB | Age: 45
Discharge: HOME | End: 2020-09-10
Payer: MEDICARE

## 2020-09-10 LAB
ALT SERPL-CCNC: 18 U/L (ref 5–41)
ANION GAP SERPL CALCULATED.3IONS-SCNC: 10 MMOL/L (ref 9–17)
AST SERPL-CCNC: 29 U/L
BUN BLDV-MCNC: 19 MG/DL (ref 6–20)
BUN/CREAT BLD: 21 (ref 9–20)
CALCIUM SERPL-MCNC: 9 MG/DL (ref 8.6–10.4)
CHLORIDE BLD-SCNC: 104 MMOL/L (ref 98–107)
CO2: 21 MMOL/L (ref 20–31)
CREAT SERPL-MCNC: 0.9 MG/DL (ref 0.7–1.2)
CREATININE URINE: 134.2 MG/DL (ref 39–259)
ESTIMATED AVERAGE GLUCOSE: 108 MG/DL
GFR AFRICAN AMERICAN: >60 ML/MIN
GFR NON-AFRICAN AMERICAN: >60 ML/MIN
GFR SERPL CREATININE-BSD FRML MDRD: ABNORMAL ML/MIN/{1.73_M2}
GFR SERPL CREATININE-BSD FRML MDRD: ABNORMAL ML/MIN/{1.73_M2}
GLUCOSE BLD-MCNC: 103 MG/DL (ref 70–99)
HBA1C MFR BLD: 5.4 % (ref 4–6)
MICROALBUMIN/CREAT 24H UR: <12 MG/L
MICROALBUMIN/CREAT UR-RTO: NORMAL MCG/MG CREAT
POTASSIUM SERPL-SCNC: 4.1 MMOL/L (ref 3.7–5.3)
SODIUM BLD-SCNC: 135 MMOL/L (ref 135–144)

## 2020-09-10 PROCEDURE — 84450 TRANSFERASE (AST) (SGOT): CPT

## 2020-09-10 PROCEDURE — 82043 UR ALBUMIN QUANTITATIVE: CPT

## 2020-09-10 PROCEDURE — 82570 ASSAY OF URINE CREATININE: CPT

## 2020-09-10 PROCEDURE — 36415 COLL VENOUS BLD VENIPUNCTURE: CPT

## 2020-09-10 PROCEDURE — 80048 BASIC METABOLIC PNL TOTAL CA: CPT

## 2020-09-10 PROCEDURE — 83036 HEMOGLOBIN GLYCOSYLATED A1C: CPT

## 2020-09-10 PROCEDURE — 84460 ALANINE AMINO (ALT) (SGPT): CPT

## 2020-09-11 ENCOUNTER — OFFICE VISIT (OUTPATIENT)
Dept: PRIMARY CARE CLINIC | Age: 45
End: 2020-09-11
Payer: MEDICARE

## 2020-09-11 VITALS
RESPIRATION RATE: 18 BRPM | WEIGHT: 216.7 LBS | HEART RATE: 80 BPM | SYSTOLIC BLOOD PRESSURE: 135 MMHG | DIASTOLIC BLOOD PRESSURE: 80 MMHG | TEMPERATURE: 97.4 F | HEIGHT: 67 IN | BODY MASS INDEX: 34.01 KG/M2

## 2020-09-11 PROCEDURE — 2022F DILAT RTA XM EVC RTNOPTHY: CPT | Performed by: NURSE PRACTITIONER

## 2020-09-11 PROCEDURE — G8427 DOCREV CUR MEDS BY ELIG CLIN: HCPCS | Performed by: NURSE PRACTITIONER

## 2020-09-11 PROCEDURE — 3044F HG A1C LEVEL LT 7.0%: CPT | Performed by: NURSE PRACTITIONER

## 2020-09-11 PROCEDURE — 99214 OFFICE O/P EST MOD 30 MIN: CPT | Performed by: NURSE PRACTITIONER

## 2020-09-11 PROCEDURE — 1036F TOBACCO NON-USER: CPT | Performed by: NURSE PRACTITIONER

## 2020-09-11 PROCEDURE — G8417 CALC BMI ABV UP PARAM F/U: HCPCS | Performed by: NURSE PRACTITIONER

## 2020-09-11 RX ORDER — HYDROCORTISONE SOD SUCCINATE 100 MG
VIAL (EA) INJECTION
COMMUNITY
Start: 2020-08-26 | End: 2022-04-14

## 2020-09-11 RX ORDER — HUMAN IMMUNOGLOBULIN G 5 G/50ML
LIQUID INTRAVENOUS
COMMUNITY
Start: 2020-08-26

## 2020-09-11 RX ORDER — HUMAN IMMUNOGLOBULIN G 40 G/400ML
LIQUID INTRAVENOUS
COMMUNITY
Start: 2020-08-26

## 2020-09-11 ASSESSMENT — ENCOUNTER SYMPTOMS
NAUSEA: 0
ABDOMINAL PAIN: 0
RHINORRHEA: 0
VOMITING: 0
COUGH: 0
WHEEZING: 0
SORE THROAT: 0
DIARRHEA: 0
CONSTIPATION: 0
SHORTNESS OF BREATH: 0

## 2020-09-11 NOTE — PATIENT INSTRUCTIONS
SURVEY:    You may be receiving a survey from [employer] regarding your visit today. Please complete the survey to enable us to provide the highest quality of care to you and your family. If you cannot score us a very good on any question, please call the office to discuss how we could have made your experience a very good one. Thank you. Patient Education        High Blood Pressure: Care Instructions  Overview     It's normal for blood pressure to go up and down throughout the day. But if it stays up, you have high blood pressure. Another name for high blood pressure is hypertension. Despite what a lot of people think, high blood pressure usually doesn't cause headaches or make you feel dizzy or lightheaded. It usually has no symptoms. But it does increase your risk of stroke, heart attack, and other problems. You and your doctor will talk about your risks of these problems based on your blood pressure. Your doctor will give you a goal for your blood pressure. Your goal will be based on your health and your age. Lifestyle changes, such as eating healthy and being active, are always important to help lower blood pressure. You might also take medicine to reach your blood pressure goal.  Follow-up care is a key part of your treatment and safety. Be sure to make and go to all appointments, and call your doctor if you are having problems. It's also a good idea to know your test results and keep a list of the medicines you take. How can you care for yourself at home? Medical treatment  · If you stop taking your medicine, your blood pressure will go back up. You may take one or more types of medicine to lower your blood pressure. Be safe with medicines. Take your medicine exactly as prescribed. Call your doctor if you think you are having a problem with your medicine. · Talk to your doctor before you start taking aspirin every day.  Aspirin can help certain people lower their risk of a heart attack or stroke. But taking aspirin isn't right for everyone, because it can cause serious bleeding. · See your doctor regularly. You may need to see the doctor more often at first or until your blood pressure comes down. · If you are taking blood pressure medicine, talk to your doctor before you take decongestants or anti-inflammatory medicine, such as ibuprofen. Some of these medicines can raise blood pressure. · Learn how to check your blood pressure at home. Lifestyle changes  · Stay at a healthy weight. This is especially important if you put on weight around the waist. Losing even 10 pounds can help you lower your blood pressure. · If your doctor recommends it, get more exercise. Walking is a good choice. Bit by bit, increase the amount you walk every day. Try for at least 30 minutes on most days of the week. You also may want to swim, bike, or do other activities. · Avoid or limit alcohol. Talk to your doctor about whether you can drink any alcohol. · Try to limit how much sodium you eat to less than 2,300 milligrams (mg) a day. Your doctor may ask you to try to eat less than 1,500 mg a day. · Eat plenty of fruits (such as bananas and oranges), vegetables, legumes, whole grains, and low-fat dairy products. · Lower the amount of saturated fat in your diet. Saturated fat is found in animal products such as milk, cheese, and meat. Limiting these foods may help you lose weight and also lower your risk for heart disease. · Do not smoke. Smoking increases your risk for heart attack and stroke. If you need help quitting, talk to your doctor about stop-smoking programs and medicines. These can increase your chances of quitting for good. When should you call for help? Call  911 anytime you think you may need emergency care. This may mean having symptoms that suggest that your blood pressure is causing a serious heart or blood vessel problem. Your blood pressure may be over 180/120.   For example, call 911 Health. If you have questions about a medical condition or this instruction, always ask your healthcare professional. [place] [address] any warranty or liability for your use of this information.

## 2020-09-11 NOTE — PROGRESS NOTES
Name: John Ham  : 1975         Chief Complaint:     Chief Complaint   Patient presents with    Diabetes     routine check, \"I had lab work done yesterday, so I want to go over those results\"    Hyperlipidemia    Hypertension       History of Present Illness:      John Ham is a 39 y.o.  male who presents with Diabetes (routine check, \"I had lab work done yesterday, so I want to go over those results\"); Hyperlipidemia; and Hypertension      Jaxon Avery is here today for a routine office visit. Patient is following with neuromuscular specialist at [facility] for his chronic disease. He is currently receiving plasma infusions twice monthly with good result. I do not have records readily available for review at this time. Diabetes   He presents for his follow-up diabetic visit. He has type 2 diabetes mellitus. His disease course has been stable. There are no hypoglycemic associated symptoms. Pertinent negatives for hypoglycemia include no dizziness, headaches, nervousness/anxiousness or sleepiness. Associated symptoms include foot paresthesias and weakness (improving). Pertinent negatives for diabetes include no chest pain, no fatigue, no foot ulcerations, no polydipsia, no polyphagia and no polyuria. There are no hypoglycemic complications. Symptoms are stable. Pertinent negatives for diabetic complications include no CVA, heart disease, nephropathy or peripheral neuropathy. Risk factors for coronary artery disease include diabetes mellitus, dyslipidemia, hypertension, male sex and obesity. Current diabetic treatment includes oral agent (dual therapy) and diet. He is compliant with treatment all of the time. His weight is stable. He is following a generally healthy diet. Meal planning includes avoidance of concentrated sweets. He has had a previous visit with a dietitian. He participates in exercise three times a week. There is no change in his home blood glucose trend. His breakfast blood glucose range is generally 110-130 mg/dl. An ACE inhibitor/angiotensin II receptor blocker is being taken. He does not see a podiatrist.Eye exam is not current. Hyperlipidemia   This is a chronic problem. The current episode started more than 1 year ago. The problem is uncontrolled. Recent lipid tests were reviewed and are high. Exacerbating diseases include diabetes and obesity. He has no history of chronic renal disease, hypothyroidism or liver disease. Factors aggravating his hyperlipidemia include fatty foods. Pertinent negatives include no chest pain, leg pain, myalgias or shortness of breath. Current antihyperlipidemic treatment includes statins. The current treatment provides moderate improvement of lipids. There are no compliance problems. Risk factors for coronary artery disease include hypertension, male sex, dyslipidemia, diabetes mellitus, obesity and family history. Hypertension   This is a chronic problem. The current episode started more than 1 year ago. The problem has been gradually worsening since onset. The problem is controlled. Pertinent negatives include no chest pain, headaches, neck pain, palpitations, peripheral edema or shortness of breath. There are no associated agents to hypertension. Risk factors for coronary artery disease include diabetes mellitus, dyslipidemia, male gender and sedentary lifestyle. Past treatments include angiotensin blockers. The current treatment provides moderate improvement. Compliance problems include exercise and diet. There is no history of kidney disease, CAD/MI or CVA. There is no history of chronic renal disease. Past Medical History:     Past Medical History:   Diagnosis Date    Hyperlipidemia     Hypertension     Pre-diabetes       Reviewed all health maintenance requirements and ordered appropriate tests  There are no preventive care reminders to display for this patient.     Past Surgical History:     Past Surgical lower leg: No edema. Skin:     General: Skin is warm and dry. Findings: No rash. Neurological:      Mental Status: He is alert and oriented to person, place, and time. Psychiatric:         Attention and Perception: He is attentive. Mood and Affect: Mood normal.         Behavior: Behavior normal.         Data:     Lab Results   Component Value Date     09/10/2020    K 4.1 09/10/2020     09/10/2020    CO2 21 09/10/2020    BUN 19 09/10/2020    CREATININE 0.90 09/10/2020    GLUCOSE 103 09/10/2020    PROT 7.8 09/29/2017    LABALBU 4.3 09/29/2017    BILITOT 0.52 09/29/2017    ALKPHOS 82 09/29/2017    AST 29 09/10/2020    ALT 18 09/10/2020     Lab Results   Component Value Date    WBC 7.7 02/03/2020    RBC 5.06 02/03/2020    HGB 14.5 02/03/2020    HCT 44.6 02/03/2020    MCV 88.1 02/03/2020    MCH 28.7 02/03/2020    MCHC 32.5 02/03/2020    RDW 13.2 02/03/2020     02/03/2020    MPV 9.7 02/03/2020     Lab Results   Component Value Date    TSH 2.17 03/24/2020     Lab Results   Component Value Date    CHOL 143 02/03/2020    HDL 57 02/03/2020    LABA1C 5.4 09/10/2020       Assessment/Plan:      Diagnosis Orders   1. Controlled type 2 diabetes mellitus with diabetic polyneuropathy, without long-term current use of insulin (HCC)  CBC Auto Differential    ALT    AST    Basic Metabolic Panel    Hemoglobin A1C    Lipid Panel   2. Dyslipidemia     3. Essential hypertension       He will continue all current medications. Diabetes is well controlled at this point. Labs and exam in 6 months. 1.  Tomás Beyer received counseling on the following healthy behaviors: nutrition, exercise and medication adherence  2. Patient given educational materials - see patient instructions  3. Was a self-tracking handout given in paper form or via [employer]? No  If yes, see orders or list here. 4.  Discussed use, benefit, and side effects of prescribed medications. Barriers to medication compliance addressed. All patient questions answered. Pt voiced understanding. 5.  Reviewed prior labs and health maintenance  6. Continue current medications, diet and exercise. Completed Refills   Requested Prescriptions      No prescriptions requested or ordered in this encounter         Return in about 6 months (around 3/11/2021) for Check up.

## 2021-03-04 ENCOUNTER — TELEPHONE (OUTPATIENT)
Dept: PRIMARY CARE CLINIC | Age: 46
End: 2021-03-04

## 2021-03-04 NOTE — TELEPHONE ENCOUNTER
Message left to complete labs Soft c-collar applied per [facility]ARRY Crawley instsr     Yesica Thakur, RN  04/05/17 3754

## 2021-03-11 ENCOUNTER — TELEPHONE (OUTPATIENT)
Dept: PRIMARY CARE CLINIC | Age: 46
End: 2021-03-11

## 2021-03-15 ENCOUNTER — HOSPITAL ENCOUNTER (OUTPATIENT)
Age: 46
Discharge: HOME | End: 2021-03-15
Payer: MEDICARE

## 2021-03-15 DIAGNOSIS — E11.42 CONTROLLED TYPE 2 DIABETES MELLITUS WITH DIABETIC POLYNEUROPATHY, WITHOUT LONG-TERM CURRENT USE OF INSULIN (HCC): ICD-10-CM

## 2021-03-15 LAB
ABSOLUTE EOS #: 0.07 K/UL (ref 0–0.44)
ABSOLUTE IMMATURE GRANULOCYTE: <0.03 K/UL (ref 0–0.3)
ABSOLUTE LYMPH #: 1.53 K/UL (ref 1.1–3.7)
ABSOLUTE MONO #: 0.64 K/UL (ref 0.1–1.2)
ALT SERPL-CCNC: 23 U/L (ref 5–41)
ANION GAP SERPL CALCULATED.3IONS-SCNC: 12 MMOL/L (ref 9–17)
AST SERPL-CCNC: 29 U/L
BASOPHILS # BLD: 1 % (ref 0–2)
BASOPHILS ABSOLUTE: 0.04 K/UL (ref 0–0.2)
BUN BLDV-MCNC: 18 MG/DL (ref 6–20)
BUN/CREAT BLD: 18 (ref 9–20)
CALCIUM SERPL-MCNC: 9.9 MG/DL (ref 8.6–10.4)
CHLORIDE BLD-SCNC: 106 MMOL/L (ref 98–107)
CHOLESTEROL/HDL RATIO: 2.8
CHOLESTEROL: 135 MG/DL
CO2: 24 MMOL/L (ref 20–31)
CREAT SERPL-MCNC: 1 MG/DL (ref 0.7–1.2)
DIFFERENTIAL TYPE: NORMAL
EOSINOPHILS RELATIVE PERCENT: 1 % (ref 1–4)
ESTIMATED AVERAGE GLUCOSE: 103 MG/DL
GFR AFRICAN AMERICAN: >60 ML/MIN
GFR NON-AFRICAN AMERICAN: >60 ML/MIN
GFR SERPL CREATININE-BSD FRML MDRD: ABNORMAL ML/MIN/{1.73_M2}
GFR SERPL CREATININE-BSD FRML MDRD: ABNORMAL ML/MIN/{1.73_M2}
GLUCOSE BLD-MCNC: 113 MG/DL (ref 70–99)
HBA1C MFR BLD: 5.2 % (ref 4–6)
HCT VFR BLD CALC: 40.9 % (ref 40.7–50.3)
HDLC SERPL-MCNC: 49 MG/DL
HEMOGLOBIN: 13.2 G/DL (ref 13–17)
IMMATURE GRANULOCYTES: 0 %
LDL CHOLESTEROL: 74 MG/DL (ref 0–130)
LYMPHOCYTES # BLD: 28 % (ref 24–43)
MCH RBC QN AUTO: 28.9 PG (ref 25.2–33.5)
MCHC RBC AUTO-ENTMCNC: 32.3 G/DL (ref 28.4–34.8)
MCV RBC AUTO: 89.5 FL (ref 82.6–102.9)
MONOCYTES # BLD: 12 % (ref 3–12)
NRBC AUTOMATED: 0 PER 100 WBC
PDW BLD-RTO: 14.3 % (ref 11.8–14.4)
PLATELET # BLD: 248 K/UL (ref 138–453)
PLATELET ESTIMATE: NORMAL
PMV BLD AUTO: 9.7 FL (ref 8.1–13.5)
POTASSIUM SERPL-SCNC: 4.7 MMOL/L (ref 3.7–5.3)
RBC # BLD: 4.57 M/UL (ref 4.21–5.77)
RBC # BLD: NORMAL 10*6/UL
SEG NEUTROPHILS: 58 % (ref 36–65)
SEGMENTED NEUTROPHILS ABSOLUTE COUNT: 3.26 K/UL (ref 1.5–8.1)
SODIUM BLD-SCNC: 142 MMOL/L (ref 135–144)
TRIGL SERPL-MCNC: 62 MG/DL
VLDLC SERPL CALC-MCNC: NORMAL MG/DL (ref 1–30)
WBC # BLD: 5.6 K/UL (ref 3.5–11.3)
WBC # BLD: NORMAL 10*3/UL

## 2021-03-15 PROCEDURE — 80061 LIPID PANEL: CPT

## 2021-03-15 PROCEDURE — 85025 COMPLETE CBC W/AUTO DIFF WBC: CPT

## 2021-03-15 PROCEDURE — 84460 ALANINE AMINO (ALT) (SGPT): CPT

## 2021-03-15 PROCEDURE — 84450 TRANSFERASE (AST) (SGOT): CPT

## 2021-03-15 PROCEDURE — 80048 BASIC METABOLIC PNL TOTAL CA: CPT

## 2021-03-15 PROCEDURE — 83036 HEMOGLOBIN GLYCOSYLATED A1C: CPT

## 2021-03-15 PROCEDURE — 36415 COLL VENOUS BLD VENIPUNCTURE: CPT

## 2021-03-18 ENCOUNTER — OFFICE VISIT (OUTPATIENT)
Dept: PRIMARY CARE CLINIC | Age: 46
End: 2021-03-18
Payer: MEDICARE

## 2021-03-18 VITALS
HEART RATE: 96 BPM | DIASTOLIC BLOOD PRESSURE: 80 MMHG | TEMPERATURE: 97.5 F | OXYGEN SATURATION: 99 % | WEIGHT: 235.9 LBS | BODY MASS INDEX: 37.02 KG/M2 | HEIGHT: 67 IN | RESPIRATION RATE: 20 BRPM | SYSTOLIC BLOOD PRESSURE: 124 MMHG

## 2021-03-18 DIAGNOSIS — E78.5 DYSLIPIDEMIA: ICD-10-CM

## 2021-03-18 DIAGNOSIS — G61.81 CHRONIC INFLAMMATORY DEMYELINATING NEUROPATHY (HCC): ICD-10-CM

## 2021-03-18 DIAGNOSIS — I10 ESSENTIAL HYPERTENSION: ICD-10-CM

## 2021-03-18 DIAGNOSIS — E11.42 CONTROLLED TYPE 2 DIABETES MELLITUS WITH DIABETIC POLYNEUROPATHY, WITHOUT LONG-TERM CURRENT USE OF INSULIN (HCC): Primary | ICD-10-CM

## 2021-03-18 PROCEDURE — 3044F HG A1C LEVEL LT 7.0%: CPT | Performed by: NURSE PRACTITIONER

## 2021-03-18 PROCEDURE — 1036F TOBACCO NON-USER: CPT | Performed by: NURSE PRACTITIONER

## 2021-03-18 PROCEDURE — G8417 CALC BMI ABV UP PARAM F/U: HCPCS | Performed by: NURSE PRACTITIONER

## 2021-03-18 PROCEDURE — G8427 DOCREV CUR MEDS BY ELIG CLIN: HCPCS | Performed by: NURSE PRACTITIONER

## 2021-03-18 PROCEDURE — 99214 OFFICE O/P EST MOD 30 MIN: CPT | Performed by: NURSE PRACTITIONER

## 2021-03-18 PROCEDURE — 2022F DILAT RTA XM EVC RTNOPTHY: CPT | Performed by: NURSE PRACTITIONER

## 2021-03-18 PROCEDURE — G8484 FLU IMMUNIZE NO ADMIN: HCPCS | Performed by: NURSE PRACTITIONER

## 2021-03-18 ASSESSMENT — ENCOUNTER SYMPTOMS
CONSTIPATION: 0
SHORTNESS OF BREATH: 0
NAUSEA: 0
ABDOMINAL PAIN: 0
SORE THROAT: 0
WHEEZING: 0
DIARRHEA: 0
RHINORRHEA: 0
COUGH: 0
VOMITING: 0

## 2021-03-18 ASSESSMENT — PATIENT HEALTH QUESTIONNAIRE - PHQ9
SUM OF ALL RESPONSES TO PHQ9 QUESTIONS 1 & 2: 0
SUM OF ALL RESPONSES TO PHQ QUESTIONS 1-9: 0

## 2021-03-18 NOTE — PROGRESS NOTES
Name: Heather Mcclellan  : 1975         Chief Complaint:     Chief Complaint   Patient presents with    Diabetes     routine check    Hypertension    Hyperlipidemia       History of Present Illness:      Heather Mcclellan is a 55 y.o.  male who presents with Diabetes (routine check), Hypertension, and Hyperlipidemia      Oscar Graf is here today for a routine office visit. Chronic inflammatory demyelinating polyneuropathy-improving, patient using Privigen at the direction of his specialist at [facility] Diabetes  He presents for his follow-up diabetic visit. He has type 2 diabetes mellitus. His disease course has been stable. There are no hypoglycemic associated symptoms. Pertinent negatives for hypoglycemia include no dizziness, headaches, nervousness/anxiousness or sleepiness. Associated symptoms include foot paresthesias and weakness (improving). Pertinent negatives for diabetes include no chest pain, no fatigue, no foot ulcerations, no polydipsia, no polyphagia and no polyuria. There are no hypoglycemic complications. Symptoms are stable. Pertinent negatives for diabetic complications include no CVA, heart disease, nephropathy or peripheral neuropathy. Risk factors for coronary artery disease include diabetes mellitus, dyslipidemia, hypertension, male sex and obesity. Current diabetic treatment includes oral agent (dual therapy) and diet. He is compliant with treatment all of the time. His weight is stable. He is following a generally healthy diet. Meal planning includes avoidance of concentrated sweets. He has had a previous visit with a dietitian. He participates in exercise three times a week. There is no change in his home blood glucose trend. His breakfast blood glucose range is generally 110-130 mg/dl. An ACE inhibitor/angiotensin II receptor blocker is being taken. He does not see a podiatrist.Eye exam is not current. Hypertension  This is a chronic problem.  The current episode started Swapna Hammonds MD at [address] N/CARPAL TUNNEL SURG Right 7/17/2017    CARPAL TUNNEL RELEASE performed by Chikis Vasquez MD at [address]        Medications:       Prior to Admission medications    Medication Sig Start Date End Date Taking? Authorizing Provider   SOLU-CORTEF 100 MG injection  8/26/20  Yes Historical Provider, MD   PRIVIGEN 40 GM/400ML SOLN  8/26/20  Yes Historical Provider, MD   PRIVIGEN 5 GM/50ML SOLN solution  8/26/20  Yes Historical Provider, MD   atorvastatin (LIPITOR) 40 MG tablet Take 1 tablet by mouth daily 4/6/20  Yes ARNOLD Restrepo CNP   sitaGLIPtan-metformin (JANUMET)  MG per tablet Take 1 tablet by mouth 2 times daily (with meals) 4/6/20  Yes ARNOLD Restrepo CNP   losartan-hydrochlorothiazide (HYZAAR) 50-12.5 MG per tablet Take 1 tablet by mouth daily   Yes Historical Provider, MD   sildenafil (VIAGRA) 50 MG tablet Take 1 tablet by mouth as needed for Erectile Dysfunction  Patient not taking: Reported on 9/11/2020 4/6/20   ARNOLD Restrepo CNP        Allergies:       Patient has no known allergies. Social History:     Tobacco:    reports that he has never smoked. He has never used smokeless tobacco.  Alcohol:      reports no history of alcohol use. Drug Use:  reports no history of drug use. Family History:     Family History   Problem Relation Age of Onset    Cancer Mother     High Cholesterol Father     Cancer Maternal Grandmother     Stroke Maternal Grandfather     Cancer Paternal Grandfather        Review of Systems:     Positive and Negative as described in HPI    Review of Systems   Constitutional: Negative for chills, fatigue and fever. HENT: Negative for congestion, rhinorrhea and sore throat. Eyes: Negative for visual disturbance. Respiratory: Negative for cough, shortness of breath and wheezing. Cardiovascular: Negative for chest pain and palpitations.    Gastrointestinal: Negative for abdominal pain, constipation, K 4.7 03/15/2021     03/15/2021    CO2 24 03/15/2021    BUN 18 03/15/2021    CREATININE 1.00 03/15/2021    GLUCOSE 113 03/15/2021    PROT 7.8 09/29/2017    LABALBU 4.3 09/29/2017    BILITOT 0.52 09/29/2017    ALKPHOS 82 09/29/2017    AST 29 03/15/2021    ALT 23 03/15/2021     Lab Results   Component Value Date    WBC 5.6 03/15/2021    RBC 4.57 03/15/2021    HGB 13.2 03/15/2021    HCT 40.9 03/15/2021    MCV 89.5 03/15/2021    MCH 28.9 03/15/2021    MCHC 32.3 03/15/2021    RDW 14.3 03/15/2021     03/15/2021    MPV 9.7 03/15/2021     Lab Results   Component Value Date    TSH 2.17 03/24/2020     Lab Results   Component Value Date    CHOL 135 03/15/2021    HDL 49 03/15/2021    LABA1C 5.2 03/15/2021       Assessment/Plan:      Diagnosis Orders   1. Controlled type 2 diabetes mellitus with diabetic polyneuropathy, without long-term current use of insulin (HCC)  Basic Metabolic Panel    Hemoglobin A1C    Microalbumin, Ur   2. Essential hypertension     3. Dyslipidemia     4. Chronic inflammatory demyelinating neuropathy (HCC)       Diabetes and hypertension well-controlled. Continue current regimens. Continue treatment at [facility] for chronic inflammatory demyelinating neuropathy. 1.  Shama Hartley received counseling on the following healthy behaviors: nutrition, exercise and medication adherence  2. Patient given educational materials - see patient instructions  3. Was a self-tracking handout given in paper form or via [employer]? No  If yes, see orders or list here. 4.  Discussed use, benefit, and side effects of prescribed medications. Barriers to medication compliance addressed. All patient questions answered. Pt voiced understanding. 5.  Reviewed prior labs and health maintenance  6. Continue current medications, diet and exercise.     Completed Refills   Requested Prescriptions      No prescriptions requested or ordered in this encounter         Return in about 6 months (around 9/18/2021) for Check up.

## 2021-03-22 DIAGNOSIS — E11.42 CONTROLLED TYPE 2 DIABETES MELLITUS WITH DIABETIC POLYNEUROPATHY, WITHOUT LONG-TERM CURRENT USE OF INSULIN (HCC): ICD-10-CM

## 2021-03-22 DIAGNOSIS — E78.5 DYSLIPIDEMIA: ICD-10-CM

## 2021-03-22 RX ORDER — ATORVASTATIN CALCIUM 40 MG/1
TABLET, FILM COATED ORAL
Qty: 90 TABLET | Refills: 3 | Status: SHIPPED | OUTPATIENT
Start: 2021-03-22 | End: 2022-03-21

## 2021-03-22 RX ORDER — SITAGLIPTIN AND METFORMIN HYDROCHLORIDE 1000; 50 MG/1; MG/1
TABLET, FILM COATED ORAL
Qty: 180 TABLET | Refills: 3 | Status: SHIPPED | OUTPATIENT
Start: 2021-03-22 | End: 2022-03-21

## 2021-03-22 RX ORDER — LOSARTAN POTASSIUM 100 MG/1
TABLET ORAL
Qty: 90 TABLET | Refills: 3 | OUTPATIENT
Start: 2021-03-22

## 2021-03-22 NOTE — TELEPHONE ENCOUNTER
Health Maintenance   Topic Date Due    COVID-19 Vaccine (1) 05/18/2021 (Originally 1/14/1991)    Hepatitis B vaccine (1 of 3 - Risk 3-dose series) 09/11/2021 (Originally 1/14/1994)    Annual Wellness Visit (AWV)  09/11/2021 (Originally 9/10/2020)    Flu vaccine (1) 09/11/2021 (Originally 9/1/2020)    Diabetic foot exam  03/18/2022 (Originally 2/4/2021)    Diabetic retinal exam  03/18/2022 (Originally 1/14/1985)    DTaP/Tdap/Td vaccine (1 - Tdap) 03/18/2022 (Originally 1/14/1994)    Pneumococcal 0-64 years Vaccine (1 of 1 - PPSV23) 03/18/2022 (Originally 1/14/1981)    Hepatitis C screen  03/18/2022 (Originally 1975)    HIV screen  03/18/2022 (Originally 1/14/1990)    Diabetic microalbuminuria test  09/10/2021    A1C test (Diabetic or Prediabetic)  03/15/2022    Lipid screen  03/15/2022    Potassium monitoring  03/15/2022    Creatinine monitoring  03/15/2022    Hepatitis A vaccine  Aged Out    Hib vaccine  Aged Out    Meningococcal (ACWY) vaccine  Aged Out             (applicable per patient's age: Cancer Screenings, Depression Screening, Fall Risk Screening, Immunizations)    Hemoglobin A1C (%)   Date Value   03/15/2021 5.2   09/10/2020 5.4   02/03/2020 5.4     Microalb/Crt.  Ratio (mcg/mg creat)   Date Value   09/10/2020 CANNOT BE CALCULATED     LDL Cholesterol (mg/dL)   Date Value   03/15/2021 74     AST (U/L)   Date Value   03/15/2021 29     ALT (U/L)   Date Value   03/15/2021 23     BUN (mg/dL)   Date Value   03/15/2021 18      (goal A1C is < 7)   (goal LDL is <100) need 30-50% reduction from baseline     BP Readings from Last 3 Encounters:   03/18/21 124/80   09/11/20 135/80   05/13/20 126/82    (goal /80)      All Future Testing planned in [facility]:  Lab Frequency Next Occurrence   Basic Metabolic Panel Once 76/43/4329   Hemoglobin A1C Once 09/14/2021   Microalbumin, Ur Once 09/14/2021       Next Visit Date:  Future Appointments   Date Time Provider Alysa Tracey   9/21/2021 8:20 AM Sara Teague, APRN - CNP [facility]            Patient Active Problem List:     Bilateral hand numbness     Controlled type 2 diabetes mellitus with diabetic polyneuropathy, without long-term current use of insulin (HCC)     Elevated blood pressure reading     Dyslipidemia     Essential hypertension     Weakness of both hands     Atrophy of muscle of both hands     Neuropathy

## 2021-04-05 DIAGNOSIS — E11.42 CONTROLLED TYPE 2 DIABETES MELLITUS WITH DIABETIC POLYNEUROPATHY, WITHOUT LONG-TERM CURRENT USE OF INSULIN (HCC): ICD-10-CM

## 2021-04-05 RX ORDER — LOSARTAN POTASSIUM 100 MG/1
100 TABLET ORAL DAILY
Qty: 90 TABLET | Refills: 3 | Status: SHIPPED | OUTPATIENT
Start: 2021-04-05 | End: 2022-03-17

## 2021-04-05 RX ORDER — LOSARTAN POTASSIUM AND HYDROCHLOROTHIAZIDE 12.5; 5 MG/1; MG/1
1 TABLET ORAL DAILY
Qty: 90 TABLET | Refills: 3 | Status: CANCELLED | OUTPATIENT
Start: 2021-04-05

## 2021-04-05 NOTE — TELEPHONE ENCOUNTER
Phone call from patient requesting a refill of Losartan-HCTZ to be sent to [facility].     Health Maintenance   Topic Date Due    COVID-19 Vaccine (1) 05/18/2021 (Originally 1/14/1991)    Hepatitis B vaccine (1 of 3 - Risk 3-dose series) 09/11/2021 (Originally 1/14/1994)    Annual Wellness Visit (AWV)  09/11/2021 (Originally 9/10/2020)    Flu vaccine (Season Ended) 09/11/2021 (Originally 9/1/2021)    Diabetic foot exam  03/18/2022 (Originally 2/4/2021)    Diabetic retinal exam  03/18/2022 (Originally 1/14/1985)    DTaP/Tdap/Td vaccine (1 - Tdap) 03/18/2022 (Originally 1/14/1994)    Pneumococcal 0-64 years Vaccine (1 of 1 - PPSV23) 03/18/2022 (Originally 1/14/1981)    Hepatitis C screen  03/18/2022 (Originally 1975)    HIV screen  03/18/2022 (Originally 1/14/1990)    Diabetic microalbuminuria test  09/10/2021    A1C test (Diabetic or Prediabetic)  03/15/2022    Lipid screen  03/15/2022    Potassium monitoring  03/15/2022    Creatinine monitoring  03/15/2022    Hepatitis A vaccine  Aged Out    Hib vaccine  Aged Out    Meningococcal (ACWY) vaccine  Aged Out             (applicable per patient's age: Cancer Screenings, Depression Screening, Fall Risk Screening, Immunizations)    Hemoglobin A1C (%)   Date Value   03/15/2021 5.2   09/10/2020 5.4   02/03/2020 5.4     Microalb/Crt.  Ratio (mcg/mg creat)   Date Value   09/10/2020 CANNOT BE CALCULATED     LDL Cholesterol (mg/dL)   Date Value   03/15/2021 74     AST (U/L)   Date Value   03/15/2021 29     ALT (U/L)   Date Value   03/15/2021 23     BUN (mg/dL)   Date Value   03/15/2021 18      (goal A1C is < 7)   (goal LDL is <100) need 30-50% reduction from baseline     BP Readings from Last 3 Encounters:   03/18/21 124/80   09/11/20 135/80   05/13/20 126/82    (goal /80)      All Future Testing planned in [facility]:  Lab Frequency Next Occurrence   Basic Metabolic Panel Once 95/82/0831   Hemoglobin A1C Once 09/14/2021   Microalbumin, Ur Once 09/14/2021 Next Visit Date:  Future Appointments   Date Time Provider Alysa Kyung   9/21/2021  8:20 AM Mecca Teague, APRN - CNP [facility]            Patient Active Problem List:     Bilateral hand numbness     Controlled type 2 diabetes mellitus with diabetic polyneuropathy, without long-term current use of insulin (HCC)     Elevated blood pressure reading     Dyslipidemia     Essential hypertension     Weakness of both hands     Atrophy of muscle of both hands     Neuropathy

## 2021-05-04 ENCOUNTER — TELEPHONE (OUTPATIENT)
Dept: PRIMARY CARE CLINIC | Age: 46
End: 2021-05-04

## 2021-07-01 ENCOUNTER — OFFICE VISIT (OUTPATIENT)
Dept: PRIMARY CARE CLINIC | Age: 46
End: 2021-07-01
Payer: MEDICARE

## 2021-07-01 VITALS
DIASTOLIC BLOOD PRESSURE: 86 MMHG | SYSTOLIC BLOOD PRESSURE: 124 MMHG | WEIGHT: 220.8 LBS | RESPIRATION RATE: 18 BRPM | OXYGEN SATURATION: 98 % | BODY MASS INDEX: 34.65 KG/M2 | TEMPERATURE: 97.6 F | HEIGHT: 67 IN | HEART RATE: 81 BPM

## 2021-07-01 DIAGNOSIS — M54.41 ACUTE RIGHT-SIDED LOW BACK PAIN WITH RIGHT-SIDED SCIATICA: Primary | ICD-10-CM

## 2021-07-01 PROCEDURE — 1036F TOBACCO NON-USER: CPT | Performed by: NURSE PRACTITIONER

## 2021-07-01 PROCEDURE — G8417 CALC BMI ABV UP PARAM F/U: HCPCS | Performed by: NURSE PRACTITIONER

## 2021-07-01 PROCEDURE — G8427 DOCREV CUR MEDS BY ELIG CLIN: HCPCS | Performed by: NURSE PRACTITIONER

## 2021-07-01 PROCEDURE — 99213 OFFICE O/P EST LOW 20 MIN: CPT | Performed by: NURSE PRACTITIONER

## 2021-07-01 RX ORDER — BACLOFEN 10 MG/1
10 TABLET ORAL 3 TIMES DAILY
Qty: 30 TABLET | Refills: 0 | Status: SHIPPED | OUTPATIENT
Start: 2021-07-01 | End: 2021-10-11 | Stop reason: SDUPTHER

## 2021-07-01 SDOH — ECONOMIC STABILITY: FOOD INSECURITY: WITHIN THE PAST 12 MONTHS, THE FOOD YOU BOUGHT JUST DIDN'T LAST AND YOU DIDN'T HAVE MONEY TO GET MORE.: NEVER TRUE

## 2021-07-01 SDOH — ECONOMIC STABILITY: FOOD INSECURITY: WITHIN THE PAST 12 MONTHS, YOU WORRIED THAT YOUR FOOD WOULD RUN OUT BEFORE YOU GOT MONEY TO BUY MORE.: NEVER TRUE

## 2021-07-01 ASSESSMENT — ENCOUNTER SYMPTOMS
BOWEL INCONTINENCE: 0
BACK PAIN: 1
ABDOMINAL PAIN: 0

## 2021-07-01 ASSESSMENT — SOCIAL DETERMINANTS OF HEALTH (SDOH): HOW HARD IS IT FOR YOU TO PAY FOR THE VERY BASICS LIKE FOOD, HOUSING, MEDICAL CARE, AND HEATING?: NOT HARD AT ALL

## 2021-07-01 NOTE — PROGRESS NOTES
Name: Dharmesh Man  : 1975         Chief Complaint:     Chief Complaint   Patient presents with    Back Pain     Ongoing. c/o lower back pain.  Leg Pain     Ongoing. c/o pain almost like cramping down legs. History of Present Illness:      Dharmesh Man is a 55 y.o.  male who presents with Back Pain (Ongoing. c/o lower back pain. ) and Leg Pain (Ongoing. c/o pain almost like cramping down legs. )      Naye López is here today for a routine office visit. States he has been having trouble with his lower back and pain in the right thigh for a little while. States he has been trying to do some core exercises but continues with tightness in discomfort. He states is not really severe pain just uncomfortable. See below for further comment. Back Pain  This is a recurrent problem. The current episode started 1 to 4 weeks ago. The problem occurs intermittently. The problem has been waxing and waning since onset. The pain is present in the lumbar spine. The quality of the pain is described as aching, shooting and cramping. The pain radiates to the right thigh. The pain is at a severity of 4/10. The pain is moderate. The pain is worse during the day. The symptoms are aggravated by bending, twisting and position. Stiffness is present in the morning. Associated symptoms include leg pain and numbness. Pertinent negatives include no abdominal pain, bladder incontinence, bowel incontinence, chest pain, dysuria, fever, headaches, paresis, paresthesias, pelvic pain, perianal numbness, tingling, weakness or weight loss. Risk factors include obesity. He has tried heat and analgesics for the symptoms. The treatment provided mild relief.          Past Medical History:     Past Medical History:   Diagnosis Date    Hyperlipidemia     Hypertension     Pre-diabetes       Reviewed all health maintenance requirements and ordered appropriate tests  There are no preventive care reminders to display for this patient. Past Surgical History:     Past Surgical History:   Procedure Laterality Date    CARPAL TUNNEL RELEASE Left 06/26/2017    Dr. Phil Rico N/CARPAL TUNNEL SURG Left 6/26/2017    CARPAL TUNNEL RELEASE performed by Jillian Palomino MD at [address]/CARPAL TUNNEL SURG Right 7/17/2017    CARPAL TUNNEL RELEASE performed by Jillian Palomino MD at [address]        Medications:       Prior to Admission medications    Medication Sig Start Date End Date Taking? Authorizing Provider   baclofen (LIORESAL) 10 MG tablet Take 1 tablet by mouth 3 times daily for 10 days 7/1/21 7/11/21 Yes Daphne Rival Might, APRN - CNP   diclofenac (VOLTAREN) 50 MG EC tablet Take 1 tablet by mouth 3 times daily (with meals) for 10 days 7/1/21 7/11/21 Yes Elner Rival Might, APRN - CNP   losartan (COZAAR) 100 MG tablet Take 1 tablet by mouth daily 4/5/21  Yes Eljosué Rival Might, APRN - CNP   atorvastatin (LIPITOR) 40 MG tablet TAKE 1 TABLET BY MOUTH EVERY DAY 3/22/21  Yes Elner Rival Might, APRN - CNP   JANUMET  MG per tablet TAKE 1 TABLET BY MOUTH TWICE A DAY WITH MEALS 3/22/21  Yes Eljosué Rival Might APRN - CNP   SOLU-CORTEF 100 MG injection  8/26/20  Yes Historical Provider, MD   PRIVIGEN 40 GM/400ML SOLN  8/26/20  Yes Historical Provider, MD   PRIVIGEN 5 GM/50ML SOLN solution  8/26/20  Yes Historical Provider, MD   sildenafil (VIAGRA) 50 MG tablet Take 1 tablet by mouth as needed for Erectile Dysfunction  Patient not taking: Reported on 9/11/2020 4/6/20   Elner Rival Might APRN - CNP        Allergies:       Patient has no known allergies. Social History:     Tobacco:    reports that he has never smoked. He has never used smokeless tobacco.  Alcohol:      reports no history of alcohol use. Drug Use:  reports no history of drug use.     Family History:     Family History   Problem Relation Age of Onset    Cancer Mother     High Cholesterol Father     Cancer Maternal Grandmother     Stroke Maternal Grandfather     Cancer Paternal Grandfather        Review of Systems:     Positive and Negative as described in HPI    Review of Systems   Constitutional: Negative for fever and weight loss. Cardiovascular: Negative for chest pain. Gastrointestinal: Negative for abdominal pain and bowel incontinence. Genitourinary: Negative for bladder incontinence, dysuria and pelvic pain. Musculoskeletal: Positive for back pain. Neurological: Positive for numbness. Negative for tingling, weakness, headaches and paresthesias. Physical Exam:   Vitals:  /86 (Site: Left Upper Arm, Position: Sitting, Cuff Size: Large Adult)   Pulse 81   Temp 97.6 °F (36.4 °C) (Temporal)   Resp 18   Ht 5' 7\" (1.702 m)   Wt 220 lb 12.8 oz (100.2 kg)   SpO2 98%   BMI 34.58 kg/m²     Physical Exam  Vitals and nursing note reviewed. Constitutional:       General: He is not in acute distress. Appearance: Normal appearance. He is obese. He is not ill-appearing. Cardiovascular:      Rate and Rhythm: Normal rate and regular rhythm. Pulmonary:      Effort: Pulmonary effort is normal.      Breath sounds: Normal breath sounds. Abdominal:      General: Bowel sounds are normal. There is no distension. Palpations: Abdomen is soft. Tenderness: There is no abdominal tenderness. Musculoskeletal:      Lumbar back: Spasms present. Decreased range of motion. Back:    Skin:     General: Skin is warm and dry. Neurological:      Mental Status: He is alert.          Data:     Lab Results   Component Value Date     03/15/2021    K 4.7 03/15/2021     03/15/2021    CO2 24 03/15/2021    BUN 18 03/15/2021    CREATININE 1.00 03/15/2021    GLUCOSE 113 03/15/2021    PROT 7.8 09/29/2017    LABALBU 4.3 09/29/2017    BILITOT 0.52 09/29/2017    ALKPHOS 82 09/29/2017    AST 29 03/15/2021    ALT 23 03/15/2021     Lab Results   Component Value Date    WBC 5.6 03/15/2021 RBC 4.57 03/15/2021    HGB 13.2 03/15/2021    HCT 40.9 03/15/2021    MCV 89.5 03/15/2021    MCH 28.9 03/15/2021    MCHC 32.3 03/15/2021    RDW 14.3 03/15/2021     03/15/2021    MPV 9.7 03/15/2021     Lab Results   Component Value Date    TSH 2.17 03/24/2020     Lab Results   Component Value Date    CHOL 135 03/15/2021    HDL 49 03/15/2021    LABA1C 5.2 03/15/2021       Assessment/Plan:      Diagnosis Orders   1. Acute right-sided low back pain with right-sided sciatica  baclofen (LIORESAL) 10 MG tablet    diclofenac (VOLTAREN) 50 MG EC tablet     We will try some gentle stretching at home. May use low-dose baclofen and diclofenac as needed for discomfort. If not resolving in a week or so I would suggest PT. Patient is agreeable. 1.  Dereck Monet received counseling on the following healthy behaviors: nutrition, exercise and medication adherence  2. Patient given educational materials - see patient instructions  3. Was a self-tracking handout given in paper form or via [employer]? No  If yes, see orders or list here. 4.  Discussed use, benefit, and side effects of prescribed medications. Barriers to medication compliance addressed. All patient questions answered. Pt voiced understanding. 5.  Reviewed prior labs and health maintenance  6. Continue current medications, diet and exercise. Completed Refills   Requested Prescriptions     Signed Prescriptions Disp Refills    baclofen (LIORESAL) 10 MG tablet 30 tablet 0     Sig: Take 1 tablet by mouth 3 times daily for 10 days    diclofenac (VOLTAREN) 50 MG EC tablet 30 tablet 0     Sig: Take 1 tablet by mouth 3 times daily (with meals) for 10 days         Return if symptoms worsen or fail to improve.

## 2021-07-01 NOTE — PATIENT INSTRUCTIONS
SURVEY:    You may be receiving a survey from [email or web address] regarding your visit today. Please complete the survey to enable us to provide the highest quality of care to you and your family. If you cannot score us a very good on any question, please call the office to discuss how we could have made your experience a very good one. Thank you.   Marine Teague, APRN-KATE Reese, APRN-CNP  DI Huerta LPN [place], [place] Sergio Petrin, MA Pam, PCA

## 2021-07-12 ENCOUNTER — TELEPHONE (OUTPATIENT)
Dept: PRIMARY CARE CLINIC | Age: 46
End: 2021-07-12

## 2021-07-21 DIAGNOSIS — M54.41 ACUTE RIGHT-SIDED LOW BACK PAIN WITH RIGHT-SIDED SCIATICA: ICD-10-CM

## 2021-08-12 DIAGNOSIS — N52.1 ERECTILE DYSFUNCTION DUE TO DISEASES CLASSIFIED ELSEWHERE: ICD-10-CM

## 2021-08-12 RX ORDER — SILDENAFIL 50 MG/1
50 TABLET, FILM COATED ORAL PRN
Qty: 30 TABLET | Refills: 3 | Status: SHIPPED | OUTPATIENT
Start: 2021-08-12

## 2021-08-12 NOTE — TELEPHONE ENCOUNTER
Health Maintenance   Topic Date Due    Colon cancer screen colonoscopy  Never done    Hepatitis B vaccine (1 of 3 - Risk 3-dose series) 09/11/2021 (Originally 1/14/1994)    Annual Wellness Visit (AWV)  09/11/2021 (Originally 9/10/2020)    Diabetic foot exam  03/18/2022 (Originally 2/4/2021)    Diabetic retinal exam  03/18/2022 (Originally 1/14/1985)    DTaP/Tdap/Td vaccine (1 - Tdap) 03/18/2022 (Originally 1/14/1994)    Pneumococcal 0-64 years Vaccine (1 of 2 - PPSV23) 03/18/2022 (Originally 1/14/1981)    Hepatitis C screen  03/18/2022 (Originally 1975)    HIV screen  03/18/2022 (Originally 1/14/1990)    Flu vaccine (1) 09/01/2021    Diabetic microalbuminuria test  09/10/2021    A1C test (Diabetic or Prediabetic)  03/15/2022    Lipid screen  03/15/2022    Potassium monitoring  03/15/2022    Creatinine monitoring  03/15/2022    COVID-19 Vaccine  Completed    Hepatitis A vaccine  Aged Out    Hib vaccine  Aged Out    Meningococcal (ACWY) vaccine  Aged Out             (applicable per patient's age: Cancer Screenings, Depression Screening, Fall Risk Screening, Immunizations)    Hemoglobin A1C (%)   Date Value   03/15/2021 5.2   09/10/2020 5.4   02/03/2020 5.4     Microalb/Crt.  Ratio (mcg/mg creat)   Date Value   09/10/2020 CANNOT BE CALCULATED     LDL Cholesterol (mg/dL)   Date Value   03/15/2021 74     AST (U/L)   Date Value   03/15/2021 29     ALT (U/L)   Date Value   03/15/2021 23     BUN (mg/dL)   Date Value   03/15/2021 18      (goal A1C is < 7)   (goal LDL is <100) need 30-50% reduction from baseline     BP Readings from Last 3 Encounters:   07/01/21 124/86   03/18/21 124/80   09/11/20 135/80    (goal /80)      All Future Testing planned in [facility]:  Lab Frequency Next Occurrence   Basic Metabolic Panel Once 57/11/0428   Hemoglobin A1C Once 09/14/2021   Microalbumin, Ur Once 09/14/2021       Next Visit Date:  Future Appointments   Date Time Provider Alysa Tracey   9/21/2021  8:20

## 2021-09-14 ENCOUNTER — TELEPHONE (OUTPATIENT)
Dept: PRIMARY CARE CLINIC | Age: 46
End: 2021-09-14

## 2021-09-22 ENCOUNTER — TELEPHONE (OUTPATIENT)
Dept: PRIMARY CARE CLINIC | Age: 46
End: 2021-09-22

## 2021-09-22 NOTE — TELEPHONE ENCOUNTER
Called patient and left message reminding him to have his labs done that Lily Briseno had ordered and to reschedule appt.

## 2021-09-29 ENCOUNTER — TELEPHONE (OUTPATIENT)
Dept: PRIMARY CARE CLINIC | Age: 46
End: 2021-09-29

## 2021-09-29 NOTE — TELEPHONE ENCOUNTER
Left message to patient in regards to open lab orders. Informed patient orders are at the hospital and to have them completed ASAP. To call the office with any questions.

## 2021-10-08 ENCOUNTER — HOSPITAL ENCOUNTER (OUTPATIENT)
Age: 46
Discharge: HOME | End: 2021-10-08
Payer: MEDICARE

## 2021-10-08 DIAGNOSIS — E11.42 CONTROLLED TYPE 2 DIABETES MELLITUS WITH DIABETIC POLYNEUROPATHY, WITHOUT LONG-TERM CURRENT USE OF INSULIN (HCC): ICD-10-CM

## 2021-10-08 LAB
ANION GAP SERPL CALCULATED.3IONS-SCNC: 9 MMOL/L (ref 9–17)
BUN BLDV-MCNC: 11 MG/DL (ref 6–20)
BUN/CREAT BLD: 12 (ref 9–20)
CALCIUM SERPL-MCNC: 9.7 MG/DL (ref 8.6–10.4)
CHLORIDE BLD-SCNC: 104 MMOL/L (ref 98–107)
CO2: 23 MMOL/L (ref 20–31)
CREAT SERPL-MCNC: 0.94 MG/DL (ref 0.7–1.2)
CREATININE URINE: 115.1 MG/DL (ref 39–259)
ESTIMATED AVERAGE GLUCOSE: 100 MG/DL
GFR AFRICAN AMERICAN: >60 ML/MIN
GFR NON-AFRICAN AMERICAN: >60 ML/MIN
GFR SERPL CREATININE-BSD FRML MDRD: NORMAL ML/MIN/{1.73_M2}
GFR SERPL CREATININE-BSD FRML MDRD: NORMAL ML/MIN/{1.73_M2}
GLUCOSE BLD-MCNC: 97 MG/DL (ref 70–99)
HBA1C MFR BLD: 5.1 % (ref 4–6)
MICROALBUMIN/CREAT 24H UR: <12 MG/L
MICROALBUMIN/CREAT UR-RTO: NORMAL MCG/MG CREAT
POTASSIUM SERPL-SCNC: 4.7 MMOL/L (ref 3.7–5.3)
SODIUM BLD-SCNC: 136 MMOL/L (ref 135–144)

## 2021-10-08 PROCEDURE — 83036 HEMOGLOBIN GLYCOSYLATED A1C: CPT

## 2021-10-08 PROCEDURE — 82043 UR ALBUMIN QUANTITATIVE: CPT

## 2021-10-08 PROCEDURE — 36415 COLL VENOUS BLD VENIPUNCTURE: CPT

## 2021-10-08 PROCEDURE — 82570 ASSAY OF URINE CREATININE: CPT

## 2021-10-08 PROCEDURE — 80048 BASIC METABOLIC PNL TOTAL CA: CPT

## 2021-10-11 ENCOUNTER — OFFICE VISIT (OUTPATIENT)
Dept: PRIMARY CARE CLINIC | Age: 46
End: 2021-10-11
Payer: MEDICARE

## 2021-10-11 VITALS
DIASTOLIC BLOOD PRESSURE: 80 MMHG | HEART RATE: 78 BPM | WEIGHT: 198.6 LBS | TEMPERATURE: 97.8 F | OXYGEN SATURATION: 100 % | RESPIRATION RATE: 20 BRPM | BODY MASS INDEX: 31.11 KG/M2 | SYSTOLIC BLOOD PRESSURE: 124 MMHG

## 2021-10-11 DIAGNOSIS — E11.42 CONTROLLED TYPE 2 DIABETES MELLITUS WITH DIABETIC POLYNEUROPATHY, WITHOUT LONG-TERM CURRENT USE OF INSULIN (HCC): Primary | ICD-10-CM

## 2021-10-11 DIAGNOSIS — E78.5 DYSLIPIDEMIA: ICD-10-CM

## 2021-10-11 DIAGNOSIS — M54.41 ACUTE RIGHT-SIDED LOW BACK PAIN WITH RIGHT-SIDED SCIATICA: ICD-10-CM

## 2021-10-11 DIAGNOSIS — Z12.11 SCREEN FOR COLON CANCER: ICD-10-CM

## 2021-10-11 PROCEDURE — 1036F TOBACCO NON-USER: CPT | Performed by: NURSE PRACTITIONER

## 2021-10-11 PROCEDURE — G8427 DOCREV CUR MEDS BY ELIG CLIN: HCPCS | Performed by: NURSE PRACTITIONER

## 2021-10-11 PROCEDURE — G8417 CALC BMI ABV UP PARAM F/U: HCPCS | Performed by: NURSE PRACTITIONER

## 2021-10-11 PROCEDURE — 99214 OFFICE O/P EST MOD 30 MIN: CPT | Performed by: NURSE PRACTITIONER

## 2021-10-11 PROCEDURE — 3044F HG A1C LEVEL LT 7.0%: CPT | Performed by: NURSE PRACTITIONER

## 2021-10-11 PROCEDURE — 2022F DILAT RTA XM EVC RTNOPTHY: CPT | Performed by: NURSE PRACTITIONER

## 2021-10-11 PROCEDURE — G8484 FLU IMMUNIZE NO ADMIN: HCPCS | Performed by: NURSE PRACTITIONER

## 2021-10-11 RX ORDER — BACLOFEN 20 MG/1
20 TABLET ORAL 3 TIMES DAILY
Qty: 90 TABLET | Refills: 0 | Status: SHIPPED | OUTPATIENT
Start: 2021-10-11 | End: 2021-11-03

## 2021-10-11 ASSESSMENT — ENCOUNTER SYMPTOMS
WHEEZING: 0
COUGH: 0
SHORTNESS OF BREATH: 0
CONSTIPATION: 0
ABDOMINAL PAIN: 0
RHINORRHEA: 0
VOMITING: 0
SORE THROAT: 0
BOWEL INCONTINENCE: 0
BACK PAIN: 1
NAUSEA: 0
DIARRHEA: 0

## 2021-10-11 NOTE — PATIENT INSTRUCTIONS
SURVEY:    You may be receiving a survey from [employer] regarding your visit today. Please complete the survey to enable us to provide the highest quality of care to you and your family. If you cannot score us a very good on any question, please call the office to discuss how we could have made your experience a very good one. Thank you. oRse Teague, APRN-KATE Rizzo, KATE Linares, LPN  Aneta Marie, LPN  Brett Romo, [place]  Lore, PCA    Patient Education        Counting Carbohydrates for Diabetes: Care Instructions  Your Care Instructions     You don't have to eat special foods when you have diabetes. You just have to be careful to eat healthy foods. Carbohydrates (carbs) raise blood sugar higher and quicker than any other nutrient. Carbs are found in desserts, breads and cereals, and fruit. They're also in starchy vegetables. These include potatoes, corn, and grains such as rice and pasta. Carbs are also in milk and yogurt. The more carbs you eat at one time, the higher your blood sugar will rise. Spreading carbs all through the day helps keep your blood sugar levels within your target range. Counting carbs is one of the best ways to keep your blood sugar under control. If you use insulin, counting carbs helps you match the right amount of insulin to the number of grams of carbs in a meal. Then you can change your diet and insulin dose as needed. Testing your blood sugar several times a day can help you learn how carbs affect your blood sugar. A registered dietitian or certified diabetes educator can help you plan meals and snacks. Follow-up care is a key part of your treatment and safety. Be sure to make and go to all appointments, and call your doctor if you are having problems. It's also a good idea to know your test results and keep a list of the medicines you take. How can you care for yourself at home?   Know your daily amount of carbohydrates  Your daily amount depends on several things, such as your weight, how active you are, which diabetes medicines you take, and what your goals are for your blood sugar levels. A registered dietitian or certified diabetes educator can help you plan how many carbs to include in each meal and snack. For most adults, a guideline for the daily amount of carbs is:  · 45 to 60 grams at each meal. That's about the same as 3 to 4 carbohydrate servings. · 15 to 20 grams at each snack. That's about the same as 1 carbohydrate serving. Count carbs  Counting carbs lets you know how much rapid-acting insulin to take before you eat. If you use an insulin pump, you get a constant rate of insulin during the day. So the pump must be programmed at meals. This gives you extra insulin to cover the rise in blood sugar after meals. If you take insulin:  · Learn your own insulin-to-carb ratio. You and your diabetes health professional will figure out the ratio. You can do this by testing your blood sugar after meals. For example, you may need a certain amount of insulin for every 15 grams of carbs. · Add up the carb grams in a meal. Then you can figure out how many units of insulin to take based on your insulin-to-carb ratio. · Exercise lowers blood sugar. You can use less insulin than you would if you were not doing exercise. Keep in mind that timing matters. If you exercise within 1 hour after a meal, your body may need less insulin for that meal than it would if you exercised 3 hours after the meal. Test your blood sugar to find out how exercise affects your need for insulin. If you do or don't take insulin:  · Look at labels on packaged foods. This can tell you how many carbs are in a serving. You can also use guides from the American Diabetes Association. · Be aware of portions, or serving sizes. If a package has two servings and you eat the whole package, you need to double the number of grams of carbohydrate listed for one serving.   · Protein, fat, and fiber do not raise blood sugar as much as carbs do. If you eat a lot of these nutrients in a meal, your blood sugar will rise more slowly than it would otherwise. Eat from all food groups  · Eat at least three meals a day. · Plan meals to include food from all the food groups. The food groups include grains, fruits, dairy, proteins, and vegetables. · Talk to your dietitian or diabetes educator about ways to add limited amounts of sweets into your meal plan. · If you drink alcohol, talk to your doctor. It may not be recommended when you are taking certain diabetes medicines. Where can you learn more? Go to https://ioSemanticspepiceweb.Geosign. org and sign in to your [employer] account. Enter O695 in the [employer] box to learn more about \"Counting Carbohydrates for Diabetes: Care Instructions. \"     If you do not have an account, please click on the \"Sign Up Now\" link. Current as of: August 31, 2020               Content Version: 13.0  © 2006-2021 Healthwise, Incorporated. Care instructions adapted under license by [facility] ([facility]). If you have questions about a medical condition or this instruction, always ask your healthcare professional. [address] any warranty or liability for your use of this information.

## 2021-10-11 NOTE — PROGRESS NOTES
Name: Mitchell Andrade  : 1975         Chief Complaint:     Chief Complaint   Patient presents with    Diabetes     routine check, C/O sciatica nerve pain right side       History of Present Illness:      Mitchell Andrade is a 55 y.o.  male who presents with Diabetes (routine check, C/O sciatica nerve pain right side)      Jabari Wild is here today for a routine office visit. Diabetes  He presents for his follow-up diabetic visit. He has type 2 diabetes mellitus. His disease course has been stable. There are no hypoglycemic associated symptoms. Pertinent negatives for hypoglycemia include no dizziness, headaches, nervousness/anxiousness or sleepiness. Associated symptoms include foot paresthesias. Pertinent negatives for diabetes include no chest pain, no fatigue, no foot ulcerations, no polydipsia, no polyphagia, no polyuria, no weakness and no weight loss. There are no hypoglycemic complications. Symptoms are stable. Pertinent negatives for diabetic complications include no CVA, heart disease, nephropathy or peripheral neuropathy. Risk factors for coronary artery disease include diabetes mellitus, dyslipidemia, hypertension, male sex and obesity. Current diabetic treatment includes oral agent (dual therapy) and diet. He is compliant with treatment all of the time. His weight is stable. He is following a generally healthy diet. Meal planning includes avoidance of concentrated sweets. He has had a previous visit with a dietitian. He participates in exercise three times a week. There is no change in his home blood glucose trend. His breakfast blood glucose range is generally 110-130 mg/dl. An ACE inhibitor/angiotensin II receptor blocker is being taken. He does not see a podiatrist.Eye exam is not current. Hyperlipidemia  This is a chronic problem. The current episode started more than 1 year ago. The problem is uncontrolled. Recent lipid tests were reviewed and are high.  Exacerbating diseases include diabetes and obesity. He has no history of chronic renal disease, hypothyroidism or liver disease. Factors aggravating his hyperlipidemia include fatty foods. Pertinent negatives include no chest pain, leg pain, myalgias or shortness of breath. Current antihyperlipidemic treatment includes statins. The current treatment provides moderate improvement of lipids. There are no compliance problems. Risk factors for coronary artery disease include hypertension, male sex, dyslipidemia, diabetes mellitus, obesity and family history. Back Pain  This is a recurrent problem. The current episode started 1 to 4 weeks ago. The problem occurs intermittently. The problem has been waxing and waning since onset. The pain is present in the lumbar spine. The quality of the pain is described as aching, shooting and cramping. The pain radiates to the right thigh. The pain is at a severity of 4/10. The pain is moderate. The pain is worse during the day. The symptoms are aggravated by bending, twisting and position. Stiffness is present in the morning. Associated symptoms include numbness. Pertinent negatives include no abdominal pain, bladder incontinence, bowel incontinence, chest pain, dysuria, fever, headaches, leg pain, paresis, paresthesias, pelvic pain, perianal numbness, tingling, weakness or weight loss. Risk factors include obesity. He has tried heat and analgesics for the symptoms. The treatment provided mild relief.          Past Medical History:     Past Medical History:   Diagnosis Date    Hyperlipidemia     Hypertension     Pre-diabetes       Reviewed all health maintenance requirements and ordered appropriate tests  Health Maintenance Due   Topic Date Due    Colon cancer screen colonoscopy  Never done    Annual Wellness Visit (AWV)  Never done       Past Surgical History:     Past Surgical History:   Procedure Laterality Date    CARPAL TUNNEL RELEASE Left 06/26/2017    Dr. Mookie Mejia REPAIR Left     UHR    MO REVISE MEDIAN N/CARPAL TUNNEL SURG Left 6/26/2017    CARPAL TUNNEL RELEASE performed by Florence Jessica MD at [facility] N/CARPAL TUNNEL SURG Right 7/17/2017    CARPAL TUNNEL RELEASE performed by Florence Jessica MD at [address]        Medications:       Prior to Admission medications    Medication Sig Start Date End Date Taking? Authorizing Provider   baclofen (LIORESAL) 20 MG tablet Take 1 tablet by mouth 3 times daily 10/11/21  Yes ARNOLD Aleman - CNP   sildenafil (VIAGRA) 50 MG tablet Take 1 tablet by mouth as needed for Erectile Dysfunction 8/12/21  Yes ARNOLD Salinas - CNP   losartan (COZAAR) 100 MG tablet Take 1 tablet by mouth daily 4/5/21  Yes ARNOLD Aleman - CNP   atorvastatin (LIPITOR) 40 MG tablet TAKE 1 TABLET BY MOUTH EVERY DAY 3/22/21  Yes ARNOLD Aleman - CNP   JANUMET  MG per tablet TAKE 1 TABLET BY MOUTH TWICE A DAY WITH MEALS 3/22/21  Yes Javi Teague APRN - CNP   SOLU-CORTEF 100 MG injection  8/26/20  Yes Historical Provider, MD   PRIVIGEN 40 GM/400ML SOLN  8/26/20  Yes Historical Provider, MD   PRIVIGEN 5 GM/50ML SOLN solution  8/26/20  Yes Historical Provider, MD        Allergies:       Patient has no known allergies. Social History:     Tobacco:    reports that he has never smoked. He has never used smokeless tobacco.  Alcohol:      reports no history of alcohol use. Drug Use:  reports no history of drug use. Family History:     Family History   Problem Relation Age of Onset    Cancer Mother     High Cholesterol Father     Cancer Maternal Grandmother     Stroke Maternal Grandfather     Cancer Paternal Grandfather        Review of Systems:     Positive and Negative as described in HPI    Review of Systems   Constitutional: Negative for chills, fatigue, fever and weight loss. HENT: Negative for congestion, rhinorrhea and sore throat. Eyes: Negative for visual disturbance.    Respiratory: Negative for cough, shortness of breath and wheezing. Cardiovascular: Negative for chest pain and palpitations. Gastrointestinal: Negative for abdominal pain, bowel incontinence, constipation, diarrhea, nausea and vomiting. Endocrine: Negative for polydipsia, polyphagia and polyuria. Genitourinary: Negative for bladder incontinence, difficulty urinating, dysuria, frequency, hematuria, pelvic pain and urgency. Musculoskeletal: Positive for back pain. Negative for myalgias, neck pain and neck stiffness. Skin: Negative for rash. Neurological: Positive for numbness. Negative for dizziness, tingling, syncope, weakness, light-headedness, headaches and paresthesias. Psychiatric/Behavioral: The patient is not nervous/anxious. Physical Exam:   Vitals:  /80 (Position: Sitting)   Pulse 78   Temp 97.8 °F (36.6 °C) (Temporal)   Resp 20   Wt 198 lb 9.6 oz (90.1 kg)   SpO2 100%   BMI 31.11 kg/m²     Physical Exam  Vitals and nursing note reviewed. Constitutional:       General: He is not in acute distress. Appearance: Normal appearance. He is well-developed. He is obese. He is not ill-appearing. HENT:      Mouth/Throat:      Mouth: Mucous membranes are moist.      Pharynx: No posterior oropharyngeal erythema. Eyes:      Conjunctiva/sclera: Conjunctivae normal.   Cardiovascular:      Rate and Rhythm: Normal rate and regular rhythm. Heart sounds: No murmur heard. Pulmonary:      Effort: Pulmonary effort is normal.      Breath sounds: Normal breath sounds. No wheezing. Abdominal:      General: Bowel sounds are normal. There is no distension. Palpations: Abdomen is soft. Tenderness: There is no abdominal tenderness. Musculoskeletal:      Cervical back: Normal range of motion and neck supple. Lumbar back: Spasms present. Decreased range of motion. Back:       Right lower leg: No edema. Left lower leg: No edema. Skin:     General: Skin is warm and dry. Findings: No rash. Neurological:      Mental Status: He is alert and oriented to person, place, and time. Psychiatric:         Attention and Perception: He is attentive. Mood and Affect: Mood normal.         Behavior: Behavior normal.         Data:     Lab Results   Component Value Date     10/08/2021    K 4.7 10/08/2021     10/08/2021    CO2 23 10/08/2021    BUN 11 10/08/2021    CREATININE 0.94 10/08/2021    GLUCOSE 97 10/08/2021    PROT 7.8 09/29/2017    LABALBU 4.3 09/29/2017    BILITOT 0.52 09/29/2017    ALKPHOS 82 09/29/2017    AST 29 03/15/2021    ALT 23 03/15/2021     Lab Results   Component Value Date    WBC 5.6 03/15/2021    RBC 4.57 03/15/2021    HGB 13.2 03/15/2021    HCT 40.9 03/15/2021    MCV 89.5 03/15/2021    MCH 28.9 03/15/2021    MCHC 32.3 03/15/2021    RDW 14.3 03/15/2021     03/15/2021    MPV 9.7 03/15/2021     Lab Results   Component Value Date    TSH 2.17 03/24/2020     Lab Results   Component Value Date    CHOL 135 03/15/2021    HDL 49 03/15/2021    LABA1C 5.1 10/08/2021       Assessment/Plan:      Diagnosis Orders   1. Controlled type 2 diabetes mellitus with diabetic polyneuropathy, without long-term current use of insulin ([facility])  CBC Auto Differential    ALT    AST    Basic Metabolic Panel    Lipid Panel    Hemoglobin A1C    Microalbumin, Ur   2. Dyslipidemia     3. Acute right-sided low back pain with right-sided sciatica  baclofen (LIORESAL) 20 MG tablet   4. Screen for colon cancer  POCT Fecal Immunochemical Test (FIT)     Continue all current medications. Labs are stable. Baclofen as needed for back pain and sciatica. We will see him back in 6 months with repeat labs, sooner if any problems. 1.  Hetaher Estrada received counseling on the following healthy behaviors: nutrition, exercise and medication adherence  2. Patient given educational materials - see patient instructions  3. Was a self-tracking handout given in paper form or via [email or web address]?  No  If yes, see orders or list here. 4.  Discussed use, benefit, and side effects of prescribed medications. Barriers to medication compliance addressed. All patient questions answered. Pt voiced understanding. 5.  Reviewed prior labs and health maintenance  6. Continue current medications, diet and exercise. Completed Refills   Requested Prescriptions     Signed Prescriptions Disp Refills    baclofen (LIORESAL) 20 MG tablet 90 tablet 0     Sig: Take 1 tablet by mouth 3 times daily         Return in about 6 months (around 4/11/2022) for Check up.

## 2021-11-03 DIAGNOSIS — M54.41 ACUTE RIGHT-SIDED LOW BACK PAIN WITH RIGHT-SIDED SCIATICA: ICD-10-CM

## 2021-11-03 RX ORDER — BACLOFEN 20 MG/1
TABLET ORAL
Qty: 90 TABLET | Refills: 0 | Status: SHIPPED | OUTPATIENT
Start: 2021-11-03 | End: 2021-12-07

## 2021-11-03 NOTE — TELEPHONE ENCOUNTER
Health Maintenance   Topic Date Due    Colon cancer screen colonoscopy  Never done    Diabetic foot exam  03/18/2022 (Originally 2/4/2021)    Diabetic retinal exam  03/18/2022 (Originally 1/14/1985)    DTaP/Tdap/Td vaccine (1 - Tdap) 03/18/2022 (Originally 1/14/1994)    Pneumococcal 0-64 years Vaccine (1 of 2 - PPSV23) 03/18/2022 (Originally 1/14/1981)    Hepatitis C screen  03/18/2022 (Originally 1975)    HIV screen  03/18/2022 (Originally 1/14/1990)    Flu vaccine (1) 04/11/2022 (Originally 9/1/2021)    Hepatitis B vaccine (1 of 3 - Risk 3-dose series) 10/11/2022 (Originally 1/14/1994)    Lipid screen  03/15/2022    A1C test (Diabetic or Prediabetic)  10/08/2022    Diabetic microalbuminuria test  10/08/2022    Potassium monitoring  10/08/2022    Creatinine monitoring  10/08/2022    COVID-19 Vaccine  Completed    Hepatitis A vaccine  Aged Out    Hib vaccine  Aged Out    Meningococcal (ACWY) vaccine  Aged Out             (applicable per patient's age: Cancer Screenings, Depression Screening, Fall Risk Screening, Immunizations)    Hemoglobin A1C (%)   Date Value   10/08/2021 5.1   03/15/2021 5.2   09/10/2020 5.4     Microalb/Crt.  Ratio (mcg/mg creat)   Date Value   10/08/2021 CANNOT BE CALCULATED     LDL Cholesterol (mg/dL)   Date Value   03/15/2021 74     AST (U/L)   Date Value   03/15/2021 29     ALT (U/L)   Date Value   03/15/2021 23     BUN (mg/dL)   Date Value   10/08/2021 11      (goal A1C is < 7)   (goal LDL is <100) need 30-50% reduction from baseline     BP Readings from Last 3 Encounters:   10/11/21 124/80   07/01/21 124/86   03/18/21 124/80    (goal /80)      All Future Testing planned in [facility]:  Lab Frequency Next Occurrence   POCT Fecal Immunochemical Test (FIT) Once 11/01/2021   CBC Auto Differential Once 04/09/2022   ALT Once 04/11/2022   AST Once 92/18/5918   Basic Metabolic Panel Once 98/52/6657   Lipid Panel Once 04/09/2022   Hemoglobin A1C Once 04/09/2022 Microalbumin, Ur Once 04/09/2022       Next Visit Date:  Future Appointments   Date Time Provider Alysa Tracey   4/12/2022  9:00 AM Jaimee Teague APRN - CNP [facility]            Patient Active Problem List:     Bilateral hand numbness     Controlled type 2 diabetes mellitus with diabetic polyneuropathy, without long-term current use of insulin (HCC)     Elevated blood pressure reading     Dyslipidemia     Essential hypertension     Weakness of both hands     Atrophy of muscle of both hands     Neuropathy

## 2021-12-14 ENCOUNTER — TELEPHONE (OUTPATIENT)
Dept: PRIMARY CARE CLINIC | Age: 46
End: 2021-12-14

## 2021-12-14 NOTE — TELEPHONE ENCOUNTER
LVM to patient reminding to complete and bring back FIT card to the office. To call the office with any questions.

## 2021-12-30 ENCOUNTER — TELEPHONE (OUTPATIENT)
Dept: PRIMARY CARE CLINIC | Age: 46
End: 2021-12-30

## 2022-01-01 DIAGNOSIS — M54.41 ACUTE RIGHT-SIDED LOW BACK PAIN WITH RIGHT-SIDED SCIATICA: ICD-10-CM

## 2022-01-03 RX ORDER — BACLOFEN 20 MG/1
TABLET ORAL
Qty: 90 TABLET | Refills: 0 | Status: SHIPPED | OUTPATIENT
Start: 2022-01-03 | End: 2022-01-28

## 2022-01-03 NOTE — TELEPHONE ENCOUNTER
Health Maintenance   Topic Date Due    Colon cancer screen colonoscopy  Never done    COVID-19 Vaccine (3 - Booster for Moderna series) 10/15/2021    Diabetic foot exam  03/18/2022 (Originally 2/4/2021)    Diabetic retinal exam  03/18/2022 (Originally 1/14/1993)    DTaP/Tdap/Td vaccine (1 - Tdap) 03/18/2022 (Originally 1/14/1994)    Pneumococcal 0-64 years Vaccine (1 of 2 - PPSV23) 03/18/2022 (Originally 1/14/1981)    Hepatitis C screen  03/18/2022 (Originally 1975)    HIV screen  03/18/2022 (Originally 1/14/1990)    Flu vaccine (1) 04/11/2022 (Originally 9/1/2021)    Hepatitis B vaccine (1 of 3 - Risk 3-dose series) 10/11/2022 (Originally 1/14/1994)    Lipid screen  03/15/2022    Depression Screen  03/18/2022    A1C test (Diabetic or Prediabetic)  10/08/2022    Diabetic microalbuminuria test  10/08/2022    Potassium monitoring  10/08/2022    Creatinine monitoring  10/08/2022    Hepatitis A vaccine  Aged Out    Hib vaccine  Aged Out    Meningococcal (ACWY) vaccine  Aged Out             (applicable per patient's age: Cancer Screenings, Depression Screening, Fall Risk Screening, Immunizations)    Hemoglobin A1C (%)   Date Value   10/08/2021 5.1   03/15/2021 5.2   09/10/2020 5.4     Microalb/Crt.  Ratio (mcg/mg creat)   Date Value   10/08/2021 CANNOT BE CALCULATED     LDL Cholesterol (mg/dL)   Date Value   03/15/2021 74     AST (U/L)   Date Value   03/15/2021 29     ALT (U/L)   Date Value   03/15/2021 23     BUN (mg/dL)   Date Value   10/08/2021 11      (goal A1C is < 7)   (goal LDL is <100) need 30-50% reduction from baseline     BP Readings from Last 3 Encounters:   10/11/21 124/80   07/01/21 124/86   03/18/21 124/80    (goal /80)      All Future Testing planned in [facility]:  Lab Frequency Next Occurrence   POCT Fecal Immunochemical Test (FIT) Once 01/13/2022   CBC Auto Differential Once 04/09/2022   ALT Once 04/11/2022   AST Once 17/33/6700   Basic Metabolic Panel Once 28/30/7357   Lipid Panel Once 04/09/2022   Hemoglobin A1C Once 04/09/2022   Microalbumin, Ur Once 04/09/2022       Next Visit Date:  Future Appointments   Date Time Provider Alysa Tracey   4/12/2022  9:00 AM ARNOLD Cole - CNP [facility]            Patient Active Problem List:     Bilateral hand numbness     Controlled type 2 diabetes mellitus with diabetic polyneuropathy, without long-term current use of insulin (HCC)     Elevated blood pressure reading     Dyslipidemia     Essential hypertension     Weakness of both hands     Atrophy of muscle of both hands     Neuropathy

## 2022-01-17 ENCOUNTER — TELEPHONE (OUTPATIENT)
Dept: PRIMARY CARE CLINIC | Age: 47
End: 2022-01-17

## 2022-01-17 NOTE — TELEPHONE ENCOUNTER
Called patient and left message reminding patient to return OC FIT test, if any questions to call office.

## 2022-02-02 ENCOUNTER — TELEPHONE (OUTPATIENT)
Dept: PRIMARY CARE CLINIC | Age: 47
End: 2022-02-02

## 2022-02-02 NOTE — TELEPHONE ENCOUNTER
Patient has been notified x 3 to return OC FIT test to the office, will extend order until 1 week prior to next appt in April.

## 2022-03-17 DIAGNOSIS — E11.42 CONTROLLED TYPE 2 DIABETES MELLITUS WITH DIABETIC POLYNEUROPATHY, WITHOUT LONG-TERM CURRENT USE OF INSULIN (HCC): ICD-10-CM

## 2022-03-17 RX ORDER — LOSARTAN POTASSIUM 100 MG/1
TABLET ORAL
Qty: 90 TABLET | Refills: 3 | Status: SHIPPED | OUTPATIENT
Start: 2022-03-17

## 2022-03-17 NOTE — TELEPHONE ENCOUNTER
Health Maintenance   Topic Date Due    Colorectal Cancer Screen  Never done    COVID-19 Vaccine (3 - Booster for Moderna series) 09/15/2021    Lipid screen  03/15/2022    Depression Screen  03/18/2022    Diabetic foot exam  03/18/2022 (Originally 2/4/2021)    Diabetic retinal exam  03/18/2022 (Originally 1/14/1993)    DTaP/Tdap/Td vaccine (1 - Tdap) 03/18/2022 (Originally 1/14/1994)    Pneumococcal 0-64 years Vaccine (1 of 2 - PPSV23) 03/18/2022 (Originally 1/14/1981)    Hepatitis C screen  03/18/2022 (Originally 1975)    HIV screen  03/18/2022 (Originally 1/14/1990)    Flu vaccine (1) 04/11/2022 (Originally 9/1/2021)    Hepatitis B vaccine (1 of 3 - Risk 3-dose series) 10/11/2022 (Originally 1/14/1994)    A1C test (Diabetic or Prediabetic)  10/08/2022    Diabetic microalbuminuria test  10/08/2022    Potassium monitoring  10/08/2022    Creatinine monitoring  10/08/2022    Hepatitis A vaccine  Aged Out    Hib vaccine  Aged Out    Meningococcal (ACWY) vaccine  Aged Out             (applicable per patient's age: Cancer Screenings, Depression Screening, Fall Risk Screening, Immunizations)    Hemoglobin A1C (%)   Date Value   10/08/2021 5.1   03/15/2021 5.2   09/10/2020 5.4     Microalb/Crt.  Ratio (mcg/mg creat)   Date Value   10/08/2021 CANNOT BE CALCULATED     LDL Cholesterol (mg/dL)   Date Value   03/15/2021 74     AST (U/L)   Date Value   03/15/2021 29     ALT (U/L)   Date Value   03/15/2021 23     BUN (mg/dL)   Date Value   10/08/2021 11      (goal A1C is < 7)   (goal LDL is <100) need 30-50% reduction from baseline     BP Readings from Last 3 Encounters:   10/11/21 124/80   07/01/21 124/86   03/18/21 124/80    (goal /80)      All Future Testing planned in [facility]:  Lab Frequency Next Occurrence   POCT Fecal Immunochemical Test (FIT) Once 04/05/2022   CBC Auto Differential Once 04/09/2022   ALT Once 04/11/2022   AST Once 03/73/2292   Basic Metabolic Panel Once 36/34/7266   Lipid Panel Once 04/09/2022   Hemoglobin A1C Once 04/09/2022   Microalbumin, Ur Once 04/09/2022       Next Visit Date:  Future Appointments   Date Time Provider Alysa Tracey   4/12/2022  9:00 AM ARNOLD Nj - CNP [facility]            Patient Active Problem List:     Bilateral hand numbness     Controlled type 2 diabetes mellitus with diabetic polyneuropathy, without long-term current use of insulin (HCC)     Elevated blood pressure reading     Dyslipidemia     Essential hypertension     Weakness of both hands     Atrophy of muscle of both hands     Neuropathy

## 2022-03-21 DIAGNOSIS — E11.42 CONTROLLED TYPE 2 DIABETES MELLITUS WITH DIABETIC POLYNEUROPATHY, WITHOUT LONG-TERM CURRENT USE OF INSULIN (HCC): ICD-10-CM

## 2022-03-21 DIAGNOSIS — M54.41 ACUTE RIGHT-SIDED LOW BACK PAIN WITH RIGHT-SIDED SCIATICA: ICD-10-CM

## 2022-03-21 DIAGNOSIS — E78.5 DYSLIPIDEMIA: ICD-10-CM

## 2022-03-21 RX ORDER — ATORVASTATIN CALCIUM 40 MG/1
TABLET, FILM COATED ORAL
Qty: 90 TABLET | Refills: 3 | Status: SHIPPED | OUTPATIENT
Start: 2022-03-21

## 2022-03-21 RX ORDER — BACLOFEN 20 MG/1
TABLET ORAL
Qty: 270 TABLET | Refills: 1 | OUTPATIENT
Start: 2022-03-21

## 2022-03-21 RX ORDER — SITAGLIPTIN AND METFORMIN HYDROCHLORIDE 1000; 50 MG/1; MG/1
TABLET, FILM COATED ORAL
Qty: 180 TABLET | Refills: 3 | Status: SHIPPED | OUTPATIENT
Start: 2022-03-21

## 2022-03-21 NOTE — TELEPHONE ENCOUNTER
Health Maintenance   Topic Date Due    Hepatitis C screen  Never done    Pneumococcal 0-64 years Vaccine (1 of 2 - PPSV23) Never done    Depression Screen  Never done    HIV screen  Never done    Diabetic retinal exam  Never done    DTaP/Tdap/Td vaccine (1 - Tdap) Never done    Colorectal Cancer Screen  Never done    Diabetic foot exam  02/04/2021    COVID-19 Vaccine (3 - Booster for Moderna series) 09/15/2021    Lipid screen  03/15/2022    Flu vaccine (1) 04/11/2022 (Originally 9/1/2021)    Hepatitis B vaccine (1 of 3 - Risk 3-dose series) 10/11/2022 (Originally 1/14/1994)    A1C test (Diabetic or Prediabetic)  10/08/2022    Diabetic microalbuminuria test  10/08/2022    Potassium monitoring  10/08/2022    Creatinine monitoring  10/08/2022    Hepatitis A vaccine  Aged Out    Hib vaccine  Aged Out    Meningococcal (ACWY) vaccine  Aged Out             (applicable per patient's age: Cancer Screenings, Depression Screening, Fall Risk Screening, Immunizations)    Hemoglobin A1C (%)   Date Value   10/08/2021 5.1   03/15/2021 5.2   09/10/2020 5.4     Microalb/Crt.  Ratio (mcg/mg creat)   Date Value   10/08/2021 CANNOT BE CALCULATED     LDL Cholesterol (mg/dL)   Date Value   03/15/2021 74     AST (U/L)   Date Value   03/15/2021 29     ALT (U/L)   Date Value   03/15/2021 23     BUN (mg/dL)   Date Value   10/08/2021 11      (goal A1C is < 7)   (goal LDL is <100) need 30-50% reduction from baseline     BP Readings from Last 3 Encounters:   10/11/21 124/80   07/01/21 124/86   03/18/21 124/80    (goal /80)      All Future Testing planned in [facility]:  Lab Frequency Next Occurrence   POCT Fecal Immunochemical Test (FIT) Once 04/05/2022   CBC Auto Differential Once 04/09/2022   ALT Once 04/11/2022   AST Once 16/57/8554   Basic Metabolic Panel Once 86/52/2402   Lipid Panel Once 04/09/2022   Hemoglobin A1C Once 04/09/2022   Microalbumin, Ur Once 04/09/2022       Next Visit Date:  Future Appointments   Date

## 2022-04-05 ENCOUNTER — TELEPHONE (OUTPATIENT)
Dept: PRIMARY CARE CLINIC | Age: 47
End: 2022-04-05

## 2022-04-06 ENCOUNTER — TELEPHONE (OUTPATIENT)
Dept: PRIMARY CARE CLINIC | Age: 47
End: 2022-04-06

## 2022-04-07 ENCOUNTER — HOSPITAL ENCOUNTER (OUTPATIENT)
Age: 47
Discharge: HOME | End: 2022-04-07
Payer: MEDICARE

## 2022-04-07 DIAGNOSIS — E11.42 CONTROLLED TYPE 2 DIABETES MELLITUS WITH DIABETIC POLYNEUROPATHY, WITHOUT LONG-TERM CURRENT USE OF INSULIN (HCC): ICD-10-CM

## 2022-04-07 LAB
ABSOLUTE EOS #: 0.03 K/UL (ref 0–0.44)
ABSOLUTE IMMATURE GRANULOCYTE: <0.03 K/UL (ref 0–0.3)
ABSOLUTE LYMPH #: 1.72 K/UL (ref 1.1–3.7)
ABSOLUTE MONO #: 0.77 K/UL (ref 0.1–1.2)
ALT SERPL-CCNC: 16 U/L (ref 5–41)
ANION GAP SERPL CALCULATED.3IONS-SCNC: 12 MMOL/L (ref 9–17)
AST SERPL-CCNC: 24 U/L
BASOPHILS # BLD: 1 % (ref 0–2)
BASOPHILS ABSOLUTE: 0.03 K/UL (ref 0–0.2)
BUN BLDV-MCNC: 20 MG/DL (ref 6–20)
BUN/CREAT BLD: 18 (ref 9–20)
CALCIUM SERPL-MCNC: 9.4 MG/DL (ref 8.6–10.4)
CHLORIDE BLD-SCNC: 101 MMOL/L (ref 98–107)
CHOLESTEROL/HDL RATIO: 3.3
CHOLESTEROL: 144 MG/DL
CO2: 22 MMOL/L (ref 20–31)
CREAT SERPL-MCNC: 1.12 MG/DL (ref 0.7–1.2)
CREATININE URINE: 152.8 MG/DL (ref 39–259)
EOSINOPHILS RELATIVE PERCENT: 1 % (ref 1–4)
ESTIMATED AVERAGE GLUCOSE: 108 MG/DL
GFR AFRICAN AMERICAN: >60 ML/MIN
GFR NON-AFRICAN AMERICAN: >60 ML/MIN
GFR SERPL CREATININE-BSD FRML MDRD: NORMAL ML/MIN/{1.73_M2}
GFR SERPL CREATININE-BSD FRML MDRD: NORMAL ML/MIN/{1.73_M2}
GLUCOSE BLD-MCNC: 81 MG/DL (ref 70–99)
HBA1C MFR BLD: 5.4 % (ref 4–6)
HCT VFR BLD CALC: 42.5 % (ref 40.7–50.3)
HDLC SERPL-MCNC: 44 MG/DL
HEMOGLOBIN: 13.8 G/DL (ref 13–17)
IMMATURE GRANULOCYTES: 0 %
LDL CHOLESTEROL: 74 MG/DL (ref 0–130)
LYMPHOCYTES # BLD: 31 % (ref 24–43)
MCH RBC QN AUTO: 29.2 PG (ref 25.2–33.5)
MCHC RBC AUTO-ENTMCNC: 32.5 G/DL (ref 28.4–34.8)
MCV RBC AUTO: 89.9 FL (ref 82.6–102.9)
MICROALBUMIN/CREAT 24H UR: <12 MG/L
MICROALBUMIN/CREAT UR-RTO: NORMAL MCG/MG CREAT
MONOCYTES # BLD: 14 % (ref 3–12)
NRBC AUTOMATED: 0 PER 100 WBC
PDW BLD-RTO: 14.4 % (ref 11.8–14.4)
PLATELET # BLD: 246 K/UL (ref 138–453)
PMV BLD AUTO: 10.3 FL (ref 8.1–13.5)
POTASSIUM SERPL-SCNC: 4.8 MMOL/L (ref 3.7–5.3)
RBC # BLD: 4.73 M/UL (ref 4.21–5.77)
SEG NEUTROPHILS: 53 % (ref 36–65)
SEGMENTED NEUTROPHILS ABSOLUTE COUNT: 3.06 K/UL (ref 1.5–8.1)
SODIUM BLD-SCNC: 135 MMOL/L (ref 135–144)
TRIGL SERPL-MCNC: 131 MG/DL
WBC # BLD: 5.6 K/UL (ref 3.5–11.3)

## 2022-04-07 PROCEDURE — 85025 COMPLETE CBC W/AUTO DIFF WBC: CPT

## 2022-04-07 PROCEDURE — 82043 UR ALBUMIN QUANTITATIVE: CPT

## 2022-04-07 PROCEDURE — 80048 BASIC METABOLIC PNL TOTAL CA: CPT

## 2022-04-07 PROCEDURE — 82570 ASSAY OF URINE CREATININE: CPT

## 2022-04-07 PROCEDURE — 83036 HEMOGLOBIN GLYCOSYLATED A1C: CPT

## 2022-04-07 PROCEDURE — 84450 TRANSFERASE (AST) (SGOT): CPT

## 2022-04-07 PROCEDURE — 36415 COLL VENOUS BLD VENIPUNCTURE: CPT

## 2022-04-07 PROCEDURE — 80061 LIPID PANEL: CPT

## 2022-04-07 PROCEDURE — 84460 ALANINE AMINO (ALT) (SGPT): CPT

## 2022-04-14 ENCOUNTER — OFFICE VISIT (OUTPATIENT)
Dept: PRIMARY CARE CLINIC | Age: 47
End: 2022-04-14
Payer: MEDICARE

## 2022-04-14 VITALS
TEMPERATURE: 97.7 F | HEART RATE: 68 BPM | SYSTOLIC BLOOD PRESSURE: 128 MMHG | OXYGEN SATURATION: 98 % | BODY MASS INDEX: 34.8 KG/M2 | RESPIRATION RATE: 20 BRPM | DIASTOLIC BLOOD PRESSURE: 74 MMHG | WEIGHT: 222.2 LBS

## 2022-04-14 DIAGNOSIS — E78.5 DYSLIPIDEMIA: ICD-10-CM

## 2022-04-14 DIAGNOSIS — M54.31 RIGHT SIDED SCIATICA: ICD-10-CM

## 2022-04-14 DIAGNOSIS — E11.42 CONTROLLED TYPE 2 DIABETES MELLITUS WITH DIABETIC POLYNEUROPATHY, WITHOUT LONG-TERM CURRENT USE OF INSULIN (HCC): Primary | ICD-10-CM

## 2022-04-14 DIAGNOSIS — G61.81 CHRONIC INFLAMMATORY DEMYELINATING NEUROPATHY (HCC): ICD-10-CM

## 2022-04-14 PROCEDURE — G8417 CALC BMI ABV UP PARAM F/U: HCPCS | Performed by: NURSE PRACTITIONER

## 2022-04-14 PROCEDURE — 99214 OFFICE O/P EST MOD 30 MIN: CPT | Performed by: NURSE PRACTITIONER

## 2022-04-14 PROCEDURE — 3044F HG A1C LEVEL LT 7.0%: CPT | Performed by: NURSE PRACTITIONER

## 2022-04-14 PROCEDURE — 1036F TOBACCO NON-USER: CPT | Performed by: NURSE PRACTITIONER

## 2022-04-14 PROCEDURE — G8427 DOCREV CUR MEDS BY ELIG CLIN: HCPCS | Performed by: NURSE PRACTITIONER

## 2022-04-14 PROCEDURE — 2022F DILAT RTA XM EVC RTNOPTHY: CPT | Performed by: NURSE PRACTITIONER

## 2022-04-14 RX ORDER — METHYLPREDNISOLONE 4 MG/1
TABLET ORAL
Qty: 1 KIT | Refills: 0 | Status: SHIPPED | OUTPATIENT
Start: 2022-04-14 | End: 2022-04-20

## 2022-04-14 ASSESSMENT — ENCOUNTER SYMPTOMS
COUGH: 0
NAUSEA: 0
WHEEZING: 0
BACK PAIN: 1
SORE THROAT: 0
CONSTIPATION: 0
VOMITING: 0
RHINORRHEA: 0
DIARRHEA: 0
BOWEL INCONTINENCE: 0
SHORTNESS OF BREATH: 0
ABDOMINAL PAIN: 0

## 2022-04-14 ASSESSMENT — PATIENT HEALTH QUESTIONNAIRE - PHQ9
SUM OF ALL RESPONSES TO PHQ QUESTIONS 1-9: 0
SUM OF ALL RESPONSES TO PHQ QUESTIONS 1-9: 0
2. FEELING DOWN, DEPRESSED OR HOPELESS: 0
SUM OF ALL RESPONSES TO PHQ9 QUESTIONS 1 & 2: 0
1. LITTLE INTEREST OR PLEASURE IN DOING THINGS: 0
SUM OF ALL RESPONSES TO PHQ QUESTIONS 1-9: 0
SUM OF ALL RESPONSES TO PHQ QUESTIONS 1-9: 0

## 2022-04-14 NOTE — PROGRESS NOTES
Name: Aneudy Jones  : 1975         Chief Complaint:     Chief Complaint   Patient presents with    Diabetes     routine check,     Lower Back Pain     right lower back, sciatica nerve? Under [facility] care. History of Present Illness:      Aneudy Jones is a 52 y.o.  male who presents with Diabetes (routine check, ) and Lower Back Pain (right lower back, sciatica nerve? Under [facility] care.)      Paloma Domitila is here today for a routine office visit. Right sided sciatica-patient is following with chiropractor, see below for further comment. Chronic inflammatory demyelinating disease-continues with prevention. Continues follow-up with specialist on a routine basis. Diabetes  He presents for his follow-up diabetic visit. He has type 2 diabetes mellitus. His disease course has been stable. There are no hypoglycemic associated symptoms. Pertinent negatives for hypoglycemia include no dizziness, headaches, nervousness/anxiousness or sleepiness. Associated symptoms include foot paresthesias. Pertinent negatives for diabetes include no chest pain, no fatigue, no foot ulcerations, no polydipsia, no polyphagia, no polyuria, no weakness and no weight loss. There are no hypoglycemic complications. Symptoms are stable. Pertinent negatives for diabetic complications include no CVA, heart disease, nephropathy or peripheral neuropathy. Risk factors for coronary artery disease include diabetes mellitus, dyslipidemia, hypertension, male sex and obesity. Current diabetic treatment includes oral agent (dual therapy) and diet. He is compliant with treatment all of the time. His weight is stable. He is following a generally healthy diet. Meal planning includes avoidance of concentrated sweets. He has had a previous visit with a dietitian. He participates in exercise three times a week. There is no change in his home blood glucose trend. His breakfast blood glucose range is generally 110-130 mg/dl.  An ACE inhibitor/angiotensin II receptor blocker is being taken. He does not see a podiatrist.Eye exam is not current. Hyperlipidemia  This is a chronic problem. The current episode started more than 1 year ago. The problem is uncontrolled. Recent lipid tests were reviewed and are high. Exacerbating diseases include diabetes and obesity. He has no history of chronic renal disease, hypothyroidism or liver disease. Factors aggravating his hyperlipidemia include fatty foods. Pertinent negatives include no chest pain, leg pain, myalgias or shortness of breath. Current antihyperlipidemic treatment includes statins. The current treatment provides moderate improvement of lipids. There are no compliance problems. Risk factors for coronary artery disease include hypertension, male sex, dyslipidemia, diabetes mellitus, obesity and family history. Back Pain  This is a recurrent problem. The current episode started 1 to 4 weeks ago. The problem occurs intermittently. The problem has been waxing and waning since onset. The pain is present in the lumbar spine. The quality of the pain is described as aching, shooting and cramping. The pain radiates to the right thigh. The pain is at a severity of 4/10. The pain is moderate. The pain is worse during the day. The symptoms are aggravated by bending, twisting and position. Stiffness is present in the morning. Associated symptoms include numbness. Pertinent negatives include no abdominal pain, bladder incontinence, bowel incontinence, chest pain, dysuria, fever, headaches, leg pain, paresis, paresthesias, pelvic pain, perianal numbness, tingling, weakness or weight loss. Risk factors include obesity. He has tried heat and analgesics for the symptoms. The treatment provided mild relief.          Past Medical History:     Past Medical History:   Diagnosis Date    Hyperlipidemia     Hypertension     Pre-diabetes       Reviewed all health maintenance requirements and ordered appropriate tests  Health Maintenance Due   Topic Date Due    Colorectal Cancer Screen  Never done    Depression Screen  03/18/2022       Past Surgical History:     Past Surgical History:   Procedure Laterality Date    CARPAL TUNNEL RELEASE Left 06/26/2017    Dr. Johanny Ferguson Bilateral     HERNIA REPAIR Left     UHR    MI REVISE MEDIAN N/CARPAL TUNNEL SURG Left 6/26/2017    CARPAL TUNNEL RELEASE performed by Isamar Camp MD at [address] N/CARPAL TUNNEL SURG Right 7/17/2017    CARPAL TUNNEL RELEASE performed by Isamar Camp MD at [address]        Medications:       Prior to Admission medications    Medication Sig Start Date End Date Taking? Authorizing Provider   methylPREDNISolone (MEDROL DOSEPACK) 4 MG tablet Take by mouth. 4/14/22 4/20/22 Yes Sharolyn Blush Might, APRN - CNP   atorvastatin (LIPITOR) 40 MG tablet TAKE 1 TABLET BY MOUTH EVERY DAY 3/21/22  Yes Sharolyn Blush Might, APRN - CNP   sitaGLIPtan-metFORMIN (JANUMET)  MG per tablet TAKE 1 TABLET BY MOUTH TWICE A DAY WITH MEALS 3/21/22  Yes Sharolyn Blush Might, APRN - CNP   losartan (COZAAR) 100 MG tablet TAKE 1 TABLET BY MOUTH EVERY DAY 3/17/22  Yes Sharolyn Blush Might, APRN - CNP   baclofen (LIORESAL) 20 MG tablet TAKE 1 TABLET BY MOUTH THREE TIMES A DAY 1/28/22  Yes Sharolyn Blush Might, APRN - CNP   sildenafil (VIAGRA) 50 MG tablet Take 1 tablet by mouth as needed for Erectile Dysfunction 8/12/21  Yes ARNOLD Salinas CNP   PRIVIGEN 40 GM/400ML SOLN  8/26/20  Yes Historical Provider, MD   PRIVIGEN 5 GM/50ML SOLN solution  8/26/20  Yes Historical Provider, MD        Allergies:       Patient has no known allergies. Social History:     Tobacco:    reports that he has never smoked. He has never used smokeless tobacco.  Alcohol:      reports no history of alcohol use. Drug Use:  reports no history of drug use.     Family History:     Family History   Problem Relation Age of Onset    Cancer Mother     High Cholesterol Father     Cancer Maternal Grandmother     Stroke Maternal Grandfather     Cancer Paternal Grandfather        Review of Systems:     Positive and Negative as described in HPI    Review of Systems   Constitutional: Negative for chills, fatigue, fever and weight loss. HENT: Negative for congestion, rhinorrhea and sore throat. Eyes: Negative for visual disturbance. Respiratory: Negative for cough, shortness of breath and wheezing. Cardiovascular: Negative for chest pain and palpitations. Gastrointestinal: Negative for abdominal pain, bowel incontinence, constipation, diarrhea, nausea and vomiting. Endocrine: Negative for polydipsia, polyphagia and polyuria. Genitourinary: Negative for bladder incontinence, difficulty urinating, dysuria, frequency, hematuria, pelvic pain and urgency. Musculoskeletal: Positive for back pain. Negative for myalgias, neck pain and neck stiffness. Skin: Negative for rash. Neurological: Positive for numbness. Negative for dizziness, tingling, syncope, weakness, light-headedness, headaches and paresthesias. Psychiatric/Behavioral: The patient is not nervous/anxious. Physical Exam:   Vitals:  /74 (Position: Sitting)   Pulse 68   Temp 97.7 °F (36.5 °C) (Temporal)   Resp 20   Wt 222 lb 3.2 oz (100.8 kg)   SpO2 98%   BMI 34.80 kg/m²     Physical Exam  Vitals and nursing note reviewed. Constitutional:       General: He is not in acute distress. Appearance: Normal appearance. He is well-developed. He is obese. He is not ill-appearing. HENT:      Mouth/Throat:      Mouth: Mucous membranes are moist.      Pharynx: No posterior oropharyngeal erythema. Eyes:      Conjunctiva/sclera: Conjunctivae normal.   Cardiovascular:      Rate and Rhythm: Normal rate and regular rhythm. Heart sounds: No murmur heard. Pulmonary:      Effort: Pulmonary effort is normal.      Breath sounds: Normal breath sounds. No wheezing.    Abdominal:      General: Bowel sounds are normal. There is no distension. Palpations: Abdomen is soft. Tenderness: There is no abdominal tenderness. Musculoskeletal:      Cervical back: Normal range of motion and neck supple. Lumbar back: Spasms present. Decreased range of motion. Back:       Right lower leg: No edema. Left lower leg: No edema. Skin:     General: Skin is warm and dry. Findings: No rash. Neurological:      Mental Status: He is alert and oriented to person, place, and time. Psychiatric:         Attention and Perception: He is attentive. Mood and Affect: Mood normal.         Behavior: Behavior normal.         Data:     Lab Results   Component Value Date     04/07/2022    K 4.8 04/07/2022     04/07/2022    CO2 22 04/07/2022    BUN 20 04/07/2022    CREATININE 1.12 04/07/2022    GLUCOSE 81 04/07/2022    PROT 7.8 09/29/2017    LABALBU 4.3 09/29/2017    BILITOT 0.52 09/29/2017    ALKPHOS 82 09/29/2017    AST 24 04/07/2022    ALT 16 04/07/2022     Lab Results   Component Value Date    WBC 5.6 04/07/2022    RBC 4.73 04/07/2022    HGB 13.8 04/07/2022    HCT 42.5 04/07/2022    MCV 89.9 04/07/2022    MCH 29.2 04/07/2022    MCHC 32.5 04/07/2022    RDW 14.4 04/07/2022     04/07/2022    MPV 10.3 04/07/2022     Lab Results   Component Value Date    TSH 2.17 03/24/2020     Lab Results   Component Value Date    CHOL 144 04/07/2022    HDL 44 04/07/2022    LABA1C 5.4 04/07/2022       Assessment/Plan:      Diagnosis Orders   1. Controlled type 2 diabetes mellitus with diabetic polyneuropathy, without long-term current use of insulin ([address]     2. Dyslipidemia     3. Chronic inflammatory demyelinating neuropathy (HCC)     4. Right sided sciatica  methylPREDNISolone (MEDROL DOSEPACK) 4 MG tablet     We will continue all current medications. Labs are stable. Blood pressure is well controlled. Medrol Dosepak as instructed for current sciatica. Continue follow-up with specialist as indicated.     We will see him back in 6 months with A1c check, sooner if any issues. 1.  Willian Stewart received counseling on the following healthy behaviors: nutrition, exercise and medication adherence  2. Patient given educational materials - see patient instructions  3. Was a self-tracking handout given in paper form or via [employer]? No  If yes, see orders or list here. 4.  Discussed use, benefit, and side effects of prescribed medications. Barriers to medication compliance addressed. All patient questions answered. Pt voiced understanding. 5.  Reviewed prior labs and health maintenance  6. Continue current medications, diet and exercise. Completed Refills   Requested Prescriptions     Signed Prescriptions Disp Refills    methylPREDNISolone (MEDROL DOSEPACK) 4 MG tablet 1 kit 0     Sig: Take by mouth. Return in about 6 months (around 10/14/2022) for Check up- A1c in office.

## 2022-04-14 NOTE — PATIENT INSTRUCTIONS
SURVEY:     You may be receiving a survey from [employer] regarding your visit today. Please complete the survey to enable us to provide the highest quality of care to you and your family. If you cannot score us a very good on any question, please call the office to discuss how we could have made your experience a very good one. Thank you. Jazzy Teague, APRN-KATE Aviles Darnell, CNP  Wilber Alexis, LPN  Dilcia Goldberg, LPN  Gerber Urias, [facility]  Christos Rico, CMA  Coretta, CMA  Lore, PCA    Patient Education        Counting Carbohydrates for Diabetes: Care Instructions  Overview     Managing the amount of carbohydrate (carbs) you eat is an important part of planning healthy meals when you have diabetes. Carbs raise blood sugar more than any other nutrient. Carbs are found in grains, starchy vegetables, fruits,and milk and yogurt. Carbs are also found in sugar-sweetened foods and drinks. The more carbs you eat at one time, the higher your blood sugar will rise. Counting carbs can help you keep your blood sugar within your target range. If you use insulin, counting carbs helps you match the right amount of insulinto the number of grams of carbs in a meal.  A registered dietitian or diabetes educator can help you plan meals and snacks. Follow-up care is a key part of your treatment and safety. Be sure to make and go to all appointments, and call your doctor if you are having problems. It's also a good idea to know your test results and keep alist of the medicines you take. How can you care for yourself at home? Know your daily amount of carbohydrates  Your daily amount depends on several things, such as your weight, how active you are, which diabetes medicines you take, and what your goals are for your blood sugar levels. A registered dietitian or diabetes educator can help youplan how many carbs to include in each meal and snack.   For most adults, a guideline for the daily amount of carbs is:   45 to 60 grams at each meal. That's about the same as 3 to 4 carbohydrate servings.  15 to 20 grams at each snack. That's about the same as 1 carbohydrate serving. Count carbs  Counting carbs lets you know how much rapid-acting insulin to take before you eat. If you use an insulin pump, you get a constant rate of insulin during the day. So the pump must be programmed at meals. This gives you extra insulin tocover the rise in blood sugar after meals. If you take insulin:   Learn your own insulin-to-carb ratio. You and your diabetes health professional will figure out the ratio. You can do this by testing your blood sugar after meals. For example, you may need a certain amount of insulin for every 15 grams of carbs.  Add up the carb grams in a meal. Then you can figure out how many units of insulin to take based on your insulin-to-carb ratio.  Exercise lowers blood sugar. You can use less insulin than you would if you were not doing exercise. Keep in mind that timing matters. If you exercise within 1 hour after a meal, your body may need less insulin for that meal than it would if you exercised 3 hours after the meal. Test your blood sugar to find out how exercise affects your need for insulin. If you do or don't take insulin:   Look at labels on packaged foods. This can tell you how many carbs are in a serving.  Be aware of portions, or serving sizes. If a package has two servings and you eat the whole package, you need to double the number of grams of carbohydrate listed for one serving.  Protein, fat, and fiber do not raise blood sugar as much as carbs do. If you eat a lot of these nutrients in a meal, your blood sugar will rise more slowly than it would otherwise. Where can you learn more? Go to https://[employer]. org and sign in to your [employer] account. Enter Y065 in the [facility] box to learn more about \"Counting Carbohydrates for Diabetes: Care Instructions. \"     If you do not have an account, please click on the \"Sign Up Now\" link. Current as of: July 28, 2021               Content Version: 13.2  © 2006-2022 Healthwise[employer] Inc[employer]. Care instructions adapted under license by [facility] ([facility]). If you have questions about a medical condition or this instruction, always ask your healthcare professional. [address] any warranty or liability for your use of this information.

## 2022-06-29 DIAGNOSIS — M54.41 ACUTE RIGHT-SIDED LOW BACK PAIN WITH RIGHT-SIDED SCIATICA: ICD-10-CM

## 2022-06-29 RX ORDER — BACLOFEN 20 MG/1
TABLET ORAL
Qty: 270 TABLET | Refills: 1 | OUTPATIENT
Start: 2022-06-29

## 2022-07-19 ENCOUNTER — OFFICE VISIT (OUTPATIENT)
Dept: PRIMARY CARE CLINIC | Age: 47
End: 2022-07-19
Payer: MEDICARE

## 2022-07-19 VITALS
TEMPERATURE: 97.4 F | DIASTOLIC BLOOD PRESSURE: 82 MMHG | WEIGHT: 204.9 LBS | OXYGEN SATURATION: 98 % | HEIGHT: 67 IN | BODY MASS INDEX: 32.16 KG/M2 | RESPIRATION RATE: 18 BRPM | HEART RATE: 85 BPM | SYSTOLIC BLOOD PRESSURE: 118 MMHG

## 2022-07-19 DIAGNOSIS — M25.651 IMPAIRED RANGE OF MOTION OF RIGHT HIP: Primary | ICD-10-CM

## 2022-07-19 DIAGNOSIS — G61.81 CHRONIC INFLAMMATORY DEMYELINATING NEUROPATHY (HCC): ICD-10-CM

## 2022-07-19 PROCEDURE — 99214 OFFICE O/P EST MOD 30 MIN: CPT | Performed by: NURSE PRACTITIONER

## 2022-07-19 SDOH — ECONOMIC STABILITY: FOOD INSECURITY: WITHIN THE PAST 12 MONTHS, THE FOOD YOU BOUGHT JUST DIDN'T LAST AND YOU DIDN'T HAVE MONEY TO GET MORE.: NEVER TRUE

## 2022-07-19 SDOH — ECONOMIC STABILITY: FOOD INSECURITY: WITHIN THE PAST 12 MONTHS, YOU WORRIED THAT YOUR FOOD WOULD RUN OUT BEFORE YOU GOT MONEY TO BUY MORE.: NEVER TRUE

## 2022-07-19 ASSESSMENT — ENCOUNTER SYMPTOMS
BACK PAIN: 1
SHORTNESS OF BREATH: 0
SORE THROAT: 0
DIARRHEA: 0
NAUSEA: 0
VOMITING: 0
ABDOMINAL PAIN: 0
WHEEZING: 0
COUGH: 0
CONSTIPATION: 0
RHINORRHEA: 0

## 2022-07-19 ASSESSMENT — SOCIAL DETERMINANTS OF HEALTH (SDOH): HOW HARD IS IT FOR YOU TO PAY FOR THE VERY BASICS LIKE FOOD, HOUSING, MEDICAL CARE, AND HEATING?: NOT HARD AT ALL

## 2022-07-19 NOTE — PROGRESS NOTES
Name: Spencer Nieto  : 1975         Chief Complaint:     Chief Complaint   Patient presents with    Back Pain     Patient is here with complaints of back pain. Patient states \"Its my sciatic nerve pain and it mostly bothers me when I bend over and it tightens up on my right leg if I stand too long. \" Patient stated that the pain comes and goes and he is having trouble now in his left side of his back. History of Present Illness:      Spencer Nieto is a 52 y.o.  male who presents with Back Pain (Patient is here with complaints of back pain. Patient states \"Its my sciatic nerve pain and it mostly bothers me when I bend over and it tightens up on my right leg if I stand too long. \" Patient stated that the pain comes and goes and he is having trouble now in his left side of his back.)      Vicky Balbuena is here today for a routine office visit. He states he is having difficulty with decreased range of motion in the back and right hip. He originally thought this was related to his chronic sciatica but he is having no pain, numbness, or tingling. He states he has been going to the chiropractor and he was told that his \"hip is out of place\". He is concerned because he is losing motion. Chronic inflammatory demyelinating neuropathy-I believe these recent developments of decreased range of motion and tightness is more related to his chronic condition. I have asked him to follow with his specialist at the [facility]. Hip Pain   The incident occurred more than 1 week ago (RIGHT, DECREASED ROM). Incident location: NO INCIDENT REPORTED. There was no injury mechanism. The pain is present in the right hip. The quality of the pain is described as aching and cramping. The pain is at a severity of 3/10. The pain is mild. The pain has been Intermittent since onset. Associated symptoms include a loss of motion and muscle weakness.  Pertinent negatives include no inability to bear weight, loss of sensation, numbness or tingling. He reports no foreign bodies present. The symptoms are aggravated by movement. He has tried rest and non-weight bearing (CHIROPRACTIC) for the symptoms. The treatment provided mild relief. Past Medical History:     Past Medical History:   Diagnosis Date    Hyperlipidemia     Hypertension     Pre-diabetes       Reviewed all health maintenance requirements and ordered appropriate tests  Health Maintenance Due   Topic Date Due    Colorectal Cancer Screen  Never done       Past Surgical History:     Past Surgical History:   Procedure Laterality Date    CARPAL TUNNEL RELEASE Left 06/26/2017    Dr. Corbin Bartlett N/CARPAL TUNNEL SURG Left 6/26/2017    CARPAL TUNNEL RELEASE performed by Rodrigo Lange MD at [address] [facility] N/CARPAL TUNNEL SURG Right 7/17/2017    CARPAL TUNNEL RELEASE performed by Rodrigo Lange MD at [address] [facility]        Medications:       Prior to Admission medications    Medication Sig Start Date End Date Taking? Authorizing Provider   atorvastatin (LIPITOR) 40 MG tablet TAKE 1 TABLET BY MOUTH EVERY DAY 3/21/22  Yes Liss Zazueta Might, ARNOLD - KATE   sitaGLIPtan-metFORMIN (JANUMET)  MG per tablet TAKE 1 TABLET BY MOUTH TWICE A DAY WITH MEALS 3/21/22  Yes Liss Teague, ARNOLD - CNP   losartan (COZAAR) 100 MG tablet TAKE 1 TABLET BY MOUTH EVERY DAY 3/17/22  Yes Liss Teague, APRN - CNP   baclofen (LIORESAL) 20 MG tablet TAKE 1 TABLET BY MOUTH THREE TIMES A DAY 1/28/22  Yes Liss Teague, APRN - CNP   sildenafil (VIAGRA) 50 MG tablet Take 1 tablet by mouth as needed for Erectile Dysfunction 8/12/21  Yes ARNOLD Salinas CNP   PRIVIGEN 40 GM/400ML SOLN  8/26/20  Yes Historical Provider, MD   PRIVIGEN 5 GM/50ML SOLN solution  8/26/20  Yes Historical Provider, MD        Allergies:       Patient has no known allergies. Social History:     Tobacco:    reports that he has never smoked.  He has never used smokeless tobacco.  Alcohol:      reports no history of alcohol use. Drug Use:  reports no history of drug use. Family History:     Family History   Problem Relation Age of Onset    Cancer Mother     High Cholesterol Father     Cancer Maternal Grandmother     Stroke Maternal Grandfather     Cancer Paternal Grandfather        Review of Systems:     Positive and Negative as described in HPI    Review of Systems   Constitutional:  Negative for chills, fatigue and fever. HENT:  Negative for congestion, rhinorrhea and sore throat. Eyes:  Negative for visual disturbance. Respiratory:  Negative for cough, shortness of breath and wheezing. Cardiovascular:  Negative for chest pain and palpitations. Gastrointestinal:  Negative for abdominal pain, constipation, diarrhea, nausea and vomiting. Genitourinary:  Negative for difficulty urinating and dysuria. Musculoskeletal:  Positive for back pain (intermittent). Negative for arthralgias, gait problem, joint swelling, myalgias, neck pain and neck stiffness. Skin:  Negative for rash. Neurological:  Positive for weakness. Negative for dizziness, tingling, syncope, light-headedness, numbness and headaches. Physical Exam:   Vitals:  /82 (Site: Left Upper Arm, Position: Sitting, Cuff Size: Medium Adult)   Pulse 85   Temp 97.4 °F (36.3 °C)   Resp 18   Ht 5' 7\" (1.702 m)   Wt 204 lb 14.4 oz (92.9 kg)   SpO2 98%   BMI 32.09 kg/m²     Physical Exam  Vitals and nursing note reviewed. Constitutional:       General: He is not in acute distress. Appearance: Normal appearance. He is obese. He is not ill-appearing. HENT:      Mouth/Throat:      Mouth: Mucous membranes are moist.   Eyes:      General: No scleral icterus. Conjunctiva/sclera: Conjunctivae normal.   Cardiovascular:      Rate and Rhythm: Normal rate and regular rhythm. Heart sounds: No murmur heard.   Pulmonary:      Effort: Pulmonary effort is normal.      Breath sounds: Normal breath sounds. No wheezing. Abdominal:      General: Bowel sounds are normal. There is no distension. Palpations: Abdomen is soft. Tenderness: There is no abdominal tenderness. Musculoskeletal:      Right hip: No deformity, lacerations, tenderness, bony tenderness or crepitus. Decreased range of motion. Decreased strength. Right lower leg: No edema. Left lower leg: No edema. Skin:     General: Skin is warm and dry. Findings: No erythema or rash. Neurological:      Mental Status: He is alert and oriented to person, place, and time. Psychiatric:         Mood and Affect: Mood normal.         Behavior: Behavior normal.       Data:     Lab Results   Component Value Date/Time     04/07/2022 08:18 AM    K 4.8 04/07/2022 08:18 AM     04/07/2022 08:18 AM    CO2 22 04/07/2022 08:18 AM    BUN 20 04/07/2022 08:18 AM    CREATININE 1.12 04/07/2022 08:18 AM    GLUCOSE 81 04/07/2022 08:18 AM    PROT 7.8 09/29/2017 08:54 AM    LABALBU 4.3 09/29/2017 08:54 AM    BILITOT 0.52 09/29/2017 08:54 AM    ALKPHOS 82 09/29/2017 08:54 AM    AST 24 04/07/2022 08:18 AM    ALT 16 04/07/2022 08:18 AM     Lab Results   Component Value Date/Time    WBC 5.6 04/07/2022 08:18 AM    RBC 4.73 04/07/2022 08:18 AM    HGB 13.8 04/07/2022 08:18 AM    HCT 42.5 04/07/2022 08:18 AM    MCV 89.9 04/07/2022 08:18 AM    MCH 29.2 04/07/2022 08:18 AM    MCHC 32.5 04/07/2022 08:18 AM    RDW 14.4 04/07/2022 08:18 AM     04/07/2022 08:18 AM    MPV 10.3 04/07/2022 08:18 AM     Lab Results   Component Value Date/Time    TSH 2.17 03/24/2020 10:19 AM     Lab Results   Component Value Date/Time    CHOL 144 04/07/2022 08:19 AM    HDL 44 04/07/2022 08:19 AM    LABA1C 5.4 04/07/2022 08:18 AM       Assessment/Plan:      Diagnosis Orders   1. Impaired range of motion of right hip  XR HIP 2-3 VW W PELVIS RIGHT      2.  Chronic inflammatory demyelinating neuropathy (HCC)          We will obtain x-rays of the right hip and pelvis. We will follow with results. He will [facility] for continued care. 1.  Jono Lovell received counseling on the following healthy behaviors: exercise and medication adherence  2. Patient given educational materials - see patient instructions  3. Was a self-tracking handout given in paper form or via [employer]? No  If yes, see orders or list here. 4.  Discussed use, benefit, and side effects of prescribed medications. Barriers to medication compliance addressed. All patient questions answered. Pt voiced understanding. 5.  Reviewed prior labs and health maintenance  6. Continue current medications, diet and exercise. Completed Refills   Requested Prescriptions      No prescriptions requested or ordered in this encounter         Return if symptoms worsen or fail to improve.

## 2022-07-19 NOTE — PATIENT INSTRUCTIONS
SURVEY:    You may be receiving a survey from [facility] regarding your visit today. Please complete the survey to enable us to provide the highest quality of care to you and your family. If you cannot score us a very good on any question, please call the office to discuss how we could have made your experience a very good one. Thank you.

## 2022-07-20 ENCOUNTER — HOSPITAL ENCOUNTER (OUTPATIENT)
Age: 47
End: 2022-07-20
Payer: MEDICARE

## 2022-07-20 ENCOUNTER — TELEPHONE (OUTPATIENT)
Dept: PRIMARY CARE CLINIC | Age: 47
End: 2022-07-20

## 2022-07-20 ENCOUNTER — HOSPITAL ENCOUNTER (OUTPATIENT)
Dept: GENERAL RADIOLOGY | Age: 47
Discharge: HOME | End: 2022-07-22
Payer: MEDICARE

## 2022-07-20 DIAGNOSIS — M25.651 IMPAIRED RANGE OF MOTION OF RIGHT HIP: ICD-10-CM

## 2022-07-20 PROCEDURE — 73502 X-RAY EXAM HIP UNI 2-3 VIEWS: CPT

## 2022-07-20 NOTE — TELEPHONE ENCOUNTER
----- Message from ARNOLD Crockett CNP sent at 7/20/2022 11:34 AM EDT -----  He has some age-related changes noted to the hip but nothing acute. Make sure he follows with a specialist in [place]. Thank you.

## 2022-07-20 NOTE — TELEPHONE ENCOUNTER
----- Message from [facility], ARNOLD - CNP sent at 7/20/2022 11:34 AM EDT -----  He has some age-related changes noted to the hip but nothing acute. Make sure he follows with a specialist in [facility]. Thank you.

## 2022-08-22 ENCOUNTER — HOSPITAL ENCOUNTER (OUTPATIENT)
Dept: PHYSICAL THERAPY | Age: 47
Setting detail: THERAPIES SERIES
Discharge: HOME | End: 2022-08-22
Payer: MEDICARE

## 2022-08-22 PROCEDURE — 97162 PT EVAL MOD COMPLEX 30 MIN: CPT

## 2022-08-22 PROCEDURE — 97110 THERAPEUTIC EXERCISES: CPT

## 2022-08-23 NOTE — PROGRESS NOTES
Phone: [address] [facility]          Fax: [phone]                      Outpatient Physical Therapy                                                                      Evaluation  Date: 2022  Patient: Marlene Marquez  : 1975  [facility] #: [id number]    Referring Physician: Zohaib Wong MD     Medical Diagnosis: M16.11, M16.12, Primary OA R, L    Treatment Diagnosis: R Hip Pain, Hip OA  Onset Date: 08/15/22  PT Insurance Information: Medicare  Total # of Visits Approved: 12   Total # of Visits to Date: 1  No Show: 0  Canceled Appointment: 0     Subjective  Subjective: Pt states he has been dealing with hip pain and R sided lower back pain and leg pain over the past few years. Pt states his pain comes and goes and can't be sure if it is related to anything specific, but does not some mild increase in pain with acitivity. Pt states his pain is mostly on the R side at this time.   Pt notes no N/T at this time, and rates pain from 0/10-9/10 at worst.  Additional Pertinent Hx: Arthritis, DMII, Non-specific Nerve condition (pt unknown of name)      Objective    AROM       AROM LLE (degrees)  L SLR: 50  L Hip Flexion 0-125: 80  L Hip External Rotation 0-45: 15  L Hip Internal Rotation 0-45: 15  AROM RLE (degrees)  R SLR: 40  R Hip Flexion 0-125: 80  R Hip External Rotation 0-45: 15  R Hip Internal Rotation 0-45: 15       Strength    General Strength Testing LE: Left WFL, Right WFL     Strength RLE  R Hip Flexion: 3+/5  R Ankle Dorsiflexion: 4-/5       Lumbar Assessment     AROM Lumbar Spine   Lumbar Spine AROM : Painful  Measured as: % of normal  Flexion: 50  Extension: 50  Lateral Flexion Right: 50  Lateral Flexion Left: 75  Right Rotation: 50  Left Rotation: 75     Trunk Strength     Trunk Strength  Trunk Flexion: 4-/5  Trunk Extension: 4-/5       Special Tests:   Special Tests Lumbar Spine  Lumbar Special Tests: Performed  ONEIL Test: R (+), L (-)  Slump Test: R (-), L (-)  Special Tests for Hip  Hip Special Tests Performed: Performed  Piriformis Tightness: R (+), L (-)  ONEIL Test: R (+), L (-)      Exercises:  Exercise 1: HEP: LTR, MET, Posterior chain stretching      Functional Outcome Measures      JEWELL: 40%        Assessment  Body Structures, Functions, Activity Limitations Requiring Skilled Therapeutic Intervention: Decreased ADL status, Decreased ROM, Decreased body mechanics, Decreased strength, Decreased tolerance to work activity, Decreased endurance, Decreased high-level IADLs, Increased pain  Assessment: Pt is a pleasant 53 yo man who presents with R sided hip and LE pain. Pt states the pain ranges from 0/10 - 9/10 and radiates down his leg from his lateral hip as well as up into his lower bck on the R side. Pt denies any N/T as this time. Pt presents with limited lumbar mobility as well as limited R hip flexion strength. Pt has limited Hip roattion B and guards passive mobility heavily. Pt presents with tight posterior chain musculature, and pain with SLR B. Pt would benefit from skilled therapy to address these functional deficits and restore pt's functional mobility and strength to improve safety and efficiency of ADLs. Therapy Prognosis: Good        Decision Making: Medium Complexity    Patient Education  Patient Education: HEP + POC  Pt verbalized/demonstrated good understanding:     [X] Yes         [] No, pt required further clarification.       Goals  Short Term Goals  Time Frame for Short term goals: 3 weeks  Short term goal 1: Pt to initate HEP  Short term goal 2: Pt to not exceed 7/10 pain to improve functional capacity for ADLs    Long Term Goals  Time Frame for Long term goals : 6 weeks  Long term goal 1: Pt to not exceed 4/10 pain to improve functional capacity for ADLs  Long term goal 2: Pt to be comfortable and complient with HEP  Long term goal 3: Pt to demonstrate >/= 100 degrees Hip Flexion B to improve functional mobility and decrease pressure on posterior structures. Long term goal 4: Pt to improve R hip flexion MMT to >/= 4/5 to improve safety of gait and other wb ADLs.       Patient goals : improve movement, flexibility and strength      Minutes Tracking:  Time In: 3441  Time Out: 7585  Minutes: 48  Timed Code Treatment Minutes: [address] Argelia Matute, [place], DPT    8/22/2022

## 2022-08-23 NOTE — PLAN OF CARE
[facility]           Phone: [phone]             Outpatient Physical Therapy  Fax: [phone]                                           Date: 2022  Patient: Shaka Light : 1975 [facility] #: [id number]   Referring Physician: Mally Vigil MD      [x] Plan of Care   [] Updated Plan of Care    Dates of Service to Include: 2022 to 10/03/22    Diagnosis:  M16.11, M16.12, Primary OA R, L    Rehab (Treatment) Diagnosis:  R Hip Pain, Hip OA             Onset Date:  08/15/22    Attendance  Total # of Visits to Date: 1 No Show: 0 Canceled Appointment: 0    Assessment  Body Structures, Functions, Activity Limitations Requiring Skilled Therapeutic Intervention: Decreased ADL status, Decreased ROM, Decreased body mechanics, Decreased strength, Decreased tolerance to work activity, Decreased endurance, Decreased high-level IADLs, Increased pain  Assessment: Pt is a pleasant 51 yo man who presents with R sided hip and LE pain. Pt states the pain ranges from 0/10 - 9/10 and radiates down his leg from his lateral hip as well as up into his lower bck on the R side. Pt denies any N/T as this time. Pt presents with limited lumbar mobility as well as limited R hip flexion strength. Pt has limited Hip roattion B and guards passive mobility heavily. Pt presents with tight posterior chain musculature, and pain with SLR B. Pt would benefit from skilled therapy to address these functional deficits and restore pt's functional mobility and strength to improve safety and efficiency of ADLs.       Goals  Short Term Goals  Time Frame for Short term goals: 3 weeks  Short term goal 1: Pt to initate HEP  Short term goal 2: Pt to not exceed 7/10 pain to improve functional capacity for ADLs  Long Term Goals  Time Frame for Long term goals : 6 weeks  Long term goal 1: Pt to not exceed 4/10 pain to improve functional capacity for ADLs  Long term goal 2: Pt to be comfortable and complient with HEP  Long term goal 3: Pt to demonstrate >/= 100 degrees Hip Flexion B to improve functional mobility and decrease pressure on posterior structures. Long term goal 4: Pt to improve R hip flexion MMT to >/= 4/5 to improve safety of gait and other wb ADLs.      Prognosis  Therapy Prognosis: Good    Treatment Plan   Plan Frequency: 2x/week  Plan weeks: 6 weeks  [x] HP/CP      [x] Electrical Stim   [x] Therapeutic Exercise      [x] Gait Training  [] Aquatics   [] Ultrasound         [x] Patient Education/HEP   [] Manual Therapy  [] Traction    [] Neuro-nabil        [x] Soft Tissue Mobs            [] Home TENS  [] Iontophoresis    [] Orthotic casting/fitting      [x] Dry Needling             Electronically signed by: Benson Mariano PT, DPT    Date: 8/22/2022      ______________________________________ Date: 8/22/2022   Physician Signature

## 2022-08-25 ENCOUNTER — HOSPITAL ENCOUNTER (OUTPATIENT)
Dept: PHYSICAL THERAPY | Age: 47
Setting detail: THERAPIES SERIES
Discharge: HOME | End: 2022-08-25
Payer: MEDICARE

## 2022-08-25 PROCEDURE — 97140 MANUAL THERAPY 1/> REGIONS: CPT

## 2022-08-25 PROCEDURE — 97110 THERAPEUTIC EXERCISES: CPT

## 2022-08-25 NOTE — PROGRESS NOTES
Phone: [facility]           Fax: [phone]                           Outpatient Physical Therapy                                                                            Daily Note    Patient: Mikaela Webb : 1975  CSN #: [id number]   Referring Physician: Cheli Salcido MD    Date: 2022       Treatment Diagnosis: R Hip Pain, Hip OA    Onset Date: 08/15/22  Total # of Visits Approved: 12 Per Physician Order  Total # of Visits to Date: 2  No Show: 0  Canceled Appointment: 0      Pre-Treatment Pain:  0-1/10  Subjective: Pt states he is doing well today with no new concerns. Did well wih HEP at home, no pain to note at this time. Exercises:  Exercise 2: Scifit 5min Level 3  Exercise 3: Trunk Flexion with ball x10 ea. Exercise 4: GS Slantboard stretching x10  Exercise 5: SLS 4x10s  Exercise 6: LTR/DKTC on ball x15 ea  Exercise 7: Hip flexor stretch supine at eob 3x15\"    Manual:  Soft Tissue Mobilizaton: Passive R hip extension with knee flexion      Assessment  Body Structures, Functions, Activity Limitations Requiring Skilled Therapeutic Intervention: Decreased ADL status, Decreased ROM, Decreased body mechanics, Decreased strength, Decreased tolerance to work activity, Decreased endurance, Decreased high-level IADLs, Increased pain  Assessment: Pt tolerated all lumbopelvic mobility and stability therex this date without increase in pain or discomfort. Pt demonstrates good motor control and understanding of therex in the clinic and for exercises at home. No increase in pain or discomfort from today's session or intervention. Will continue to progress pt as tolerated. Activity Tolerance  Activity Tolerance: Patient tolerated treatment well    Patient Education  Patient Education: HEP  Pt verbalized/demonstrated good understanding:     [x] Yes         [] No, pt required further clarification.        Post Treatment Pain:  0-1/10      Plan  Plan Frequency: 2x/week  Plan weeks: 6 weeks       Goals  (Total # of Visits to Date: 2)      Short Term Goals  Time Frame for Short term goals: 3 weeks  Short term goal 1: Pt to initate HEP  Short term goal 2: Pt to not exceed 7/10 pain to improve functional capacity for ADLs    Long Term Goals  Time Frame for Long term goals : 6 weeks  Long term goal 1: Pt to not exceed 4/10 pain to improve functional capacity for ADLs  Long term goal 2: Pt to be comfortable and complient with HEP  Long term goal 3: Pt to demonstrate >/= 100 degrees Hip Flexion B to improve functional mobility and decrease pressure on posterior structures. Long term goal 4: Pt to improve R hip flexion MMT to >/= 4/5 to improve safety of gait and other wb ADLs.     Minutes Tracking:  Time In: 1146  Time Out: 1229  Minutes: 43  Timed Code Treatment Minutes: 1006 Bora Webb PT     Date: 8/25/2022

## 2022-08-30 ENCOUNTER — HOSPITAL ENCOUNTER (OUTPATIENT)
Dept: PHYSICAL THERAPY | Age: 47
Setting detail: THERAPIES SERIES
Discharge: HOME | End: 2022-08-30
Payer: MEDICARE

## 2022-08-30 PROCEDURE — 97110 THERAPEUTIC EXERCISES: CPT

## 2022-08-30 NOTE — PROGRESS NOTES
Phone: [facility]           Fax: [phone]                           Outpatient Physical Therapy                                                                            Daily Note    Patient: Ju Webb : 1975  CSN #: [id number]   Referring Physician: Elmira Leo MD    Date: 2022       Treatment Diagnosis: R Hip Pain, Hip OA    Onset Date: 08/15/22  Total # of Visits Approved: 12 Per Physician Order  Total # of Visits to Date: 3  No Show: 0  Canceled Appointment: 0      Pre-Treatment Pain:  3/10  Subjective: Pt states he is continuing to do well, noticing he is resting with less hip ER than previously. Notes 3/10 pain this date. Exercises:  Exercise 1: HEP: LTR, MET, Posterior chain stretching  Exercise 2: Scifit 5min Level 3  Exercise 4: GS Slantboard stretching x10/Standing HS stretch at steps 3x20\" ea  Exercise 5: SLS 4x10s on foam  Exercise 8: lateral band walks YTB 2 laps / Monster walks YTB 2 laps  Exercise 9: Abset + Hip ABD x10  Exercise 10: Prone rectus stretch 2x45\"      Assessment  Body Structures, Functions, Activity Limitations Requiring Skilled Therapeutic Intervention: Decreased ADL status, Decreased ROM, Decreased body mechanics, Decreased strength, Decreased tolerance to work activity, Decreased endurance, Decreased high-level IADLs, Increased pain  Assessment: Pt contniues to do well with therex in the clinic and notes no increase in pain or discomfort. Pt did well with lateral band and monster walking this date and demonstrates good motor control as well as responding well to verbal cues. Plan to continue to progress strength, RoM and balance as tolerated. Activity Tolerance  Activity Tolerance: Patient tolerated treatment well    Patient Education  Patient Education: HEP  Pt verbalized/demonstrated good understanding:     [x] Yes         [] No, pt required further clarification.        Post Treatment Pain: 2/10      Plan  Plan Frequency: 2x/week  Plan weeks: 6 weeks       Goals  (Total # of Visits to Date: 3)      Short Term Goals  Time Frame for Short term goals: 3 weeks  Short term goal 1: Pt to initate HEP  Short term goal 2: Pt to not exceed 7/10 pain to improve functional capacity for ADLs    Long Term Goals  Time Frame for Long term goals : 6 weeks  Long term goal 1: Pt to not exceed 4/10 pain to improve functional capacity for ADLs  Long term goal 2: Pt to be comfortable and complient with HEP  Long term goal 3: Pt to demonstrate >/= 100 degrees Hip Flexion B to improve functional mobility and decrease pressure on posterior structures. Long term goal 4: Pt to improve R hip flexion MMT to >/= 4/5 to improve safety of gait and other wb ADLs.     Minutes Tracking:  Time In: 1400  Time Out: 9941  Minutes: 44  Timed Code Treatment Minutes: [address], PT     Date: 8/30/2022

## 2022-09-02 ENCOUNTER — HOSPITAL ENCOUNTER (OUTPATIENT)
Dept: PHYSICAL THERAPY | Age: 47
Setting detail: THERAPIES SERIES
Discharge: HOME | End: 2022-09-02
Payer: MEDICARE

## 2022-09-02 PROCEDURE — 97110 THERAPEUTIC EXERCISES: CPT

## 2022-09-02 NOTE — PROGRESS NOTES
Phone: John           Fax: [phone]                           Outpatient Physical Therapy                                                                            Daily Note    Patient: Daisy Puga : 1975  CSN #: [id number]   Referring Physician: Giuseppe Joseph MD    Date: 2022       Treatment Diagnosis: R Hip Pain, Hip OA    Onset Date: 08/15/22  Total # of Visits Approved: 12 Per Physician Order  Total # of Visits to Date: 4  No Show: 0  Canceled Appointment: 0    Pre-Treatment Pain:  3/10  Subjective: Pt reports he gets a little more pain on front side of R hip but hes been doing HEP 2x a day. Pt rates current pain a 3/10. Exercises:  Exercise 1: HEP: LTR, MET, Posterior chain stretching  Exercise 2: Scifit 5min Level 3  Exercise 7: Hip flexor stretch supine at eob 3x15\" - step hip flexor stretch today. Exercise 8: lateral band walks YTB 2 laps / Monster walks YTB 2 laps  Exercise 11: FSU/ LSU 10x ea 6 inch  Exercise 12: sit<>stands 10x  Exercise 13: toe raise with bottom on counter 15x    Assessment  Assessment: Increased functional strengtheing exercises today d/t R hip ER and slight foot drop with gait cycle. Reviewed form with HEP to aim at muscles targeted. WIll continue to progress. Activity Tolerance  Activity Tolerance: Patient tolerated treatment well    Patient Education  Patient Education: HEP  Pt verbalized/demonstrated good understanding:     [x] Yes         [] No, pt required further clarification.      Post Treatment Pain:  3/10    Plan  Plan Frequency: 2x/week  Plan weeks: 6 weeks     Goals  (Total # of Visits to Date: 4)      Short Term Goals  Time Frame for Short term goals: 3 weeks  Short term goal 1: Pt to initate HEP -MET  Short term goal 2: Pt to not exceed 7/10 pain to improve functional capacity for ADLs    Long Term Goals  Time Frame for Long term goals : 6 weeks  Long term goal 1: Pt to not exceed 4/10 pain to improve functional capacity for ADLs  Long term goal 2: Pt to be comfortable and complient with HEP  Long term goal 3: Pt to demonstrate >/= 100 degrees Hip Flexion B to improve functional mobility and decrease pressure on posterior structures. Long term goal 4: Pt to improve R hip flexion MMT to >/= 4/5 to improve safety of gait and other wb ADLs.     Minutes Tracking:  Time In: 6244  Time Out: [place]  Minutes: 46  Timed Code Treatment Minutes: [address] [place], [place]     Date: 9/2/2022

## 2022-09-07 ENCOUNTER — HOSPITAL ENCOUNTER (OUTPATIENT)
Dept: PHYSICAL THERAPY | Age: 47
Setting detail: THERAPIES SERIES
Discharge: HOME | End: 2022-09-07
Payer: MEDICARE

## 2022-09-07 PROCEDURE — 97110 THERAPEUTIC EXERCISES: CPT

## 2022-09-07 NOTE — PROGRESS NOTES
Phone: John           Fax: [phone]                           Outpatient Physical Therapy                                                                            Daily Note    Patient: Rupal Griffiths : 1975  CSN #: [id number]   Referring Physician: Bruce Cooley MD    Date: 2022    Diagnosis: M16.11, M16.12, Primary OA R, L  Treatment Diagnosis: R Hip Pain, Hip OA    Onset Date: 08/15/22  PT Insurance Information: Medicare  Total # of Visits Approved: 12 Per Physician Order  Total # of Visits to Date: 5  No Show: 0  Canceled Appointment: 0      Pre-Treatment Pain:  2/10  Subjective: Patient reports a little R anterior hip pain but not too bad today. Exercises:  Exercise 1: HEP: LTR, MET, Posterior chain stretching  Exercise 2: Scifit 8min Level 3  Exercise 4: GS Slantboard stretching x10/Standing HS stretch at steps 3x20\" ea  Exercise 7: Hip flexor stretch supine at eob 3x15\" - step hip flexor stretch today. Exercise 8: lateral band walks YTB 2 laps / Monster walks YTB 2 laps  Exercise 10: Prone rectus stretch 2x45\"  Exercise 11: FSU/ LSU 15x ea 6 inch  Exercise 12: sit<>stands 15x  Exercise 13: toe raise with bottom on counter 15x  Exercise 14: Startrac HS curls 6pl DL 2x15  Exercise 15: TG level 8 SL squats 10x ea  Exercise 16: Standing hip adductor stretch [id number] holds ea leg    Assessment  Assessment: Continued focusing on functional strengthening with SL squats on total gym and startrac HS curls. Added standing hip adductor stretch as well to improve hip ROM/mobility. Will continue. Activity Tolerance  Activity Tolerance: Patient tolerated treatment well    Patient Education  Exercise technique and progression   Pt verbalized/demonstrated good understanding:     [x] Yes         [] No, pt required further clarification.        Post Treatment Pain:  2/10      Plan  Plan Frequency: 2x/week  Plan weeks: 6 weeks       Goals  (Total # of Visits to Date: 5)      Short Term Goals  Time Frame for Short term goals: 3 weeks  Short term goal 1: Pt to initate HEP -MET  Short term goal 2: Pt to not exceed 7/10 pain to improve functional capacity for ADLs    Long Term Goals  Time Frame for Long term goals : 6 weeks  Long term goal 1: Pt to not exceed 4/10 pain to improve functional capacity for ADLs  Long term goal 2: Pt to be comfortable and complient with HEP  Long term goal 3: Pt to demonstrate >/= 100 degrees Hip Flexion B to improve functional mobility and decrease pressure on posterior structures. Long term goal 4: Pt to improve R hip flexion MMT to >/= 4/5 to improve safety of gait and other wb ADLs.     Minutes Tracking:  Time In: 3989  Time Out: [address]  Minutes: 42  Timed Code Treatment Minutes: [address] [facility] [address] ALICIA Fiore, DPT     Date: 9/7/2022

## 2022-09-09 ENCOUNTER — HOSPITAL ENCOUNTER (OUTPATIENT)
Dept: PHYSICAL THERAPY | Age: 47
Setting detail: THERAPIES SERIES
Discharge: HOME | End: 2022-09-09
Payer: MEDICARE

## 2022-09-09 PROCEDURE — 97140 MANUAL THERAPY 1/> REGIONS: CPT

## 2022-09-09 PROCEDURE — 97110 THERAPEUTIC EXERCISES: CPT

## 2022-09-09 NOTE — PROGRESS NOTES
Phone: John           Fax: [phone]                           Outpatient Physical Therapy                                                                            Daily Note    Patient: Shaka Light : 1975  CSN #: [id number]   Referring Physician: Mally Vigil MD    Date: 2022     Treatment Diagnosis: R Hip Pain, Hip OA    Onset Date: 08/15/22  PT Insurance Information: Medicare  Total # of Visits Approved: 12 Per Physician Order  Total # of Visits to Date: 6  No Show: 0  Canceled Appointment: 0    Pre-Treatment Pain:  0/10  Subjective: Pt states he has been watching how he has been moving at home and trying to better his form which he feels is helping. Pt denies current pain at rest but still gets some on the front side of his R hip. Exercises:  Exercise 1: HEP: LTR, MET, Posterior chain stretching  Exercise 2: Scifit 8min Level 3  Exercise 7: Hip flexor stretch supine at eob 3x15\" - step hip flexor stretch today. Exercise 8: lateral band walks YTB 2 laps / Monster walks YTB 2 laps  Exercise 10: Prone rectus stretch 2x45\"  Exercise 11: FSU/ LSU 15x ea 6 inch  Exercise 12: sit<>stands 15x  Exercise 13: toe raise with bottom on counter 15x  Exercise 14: Startrac HS curls 6pl DL 2x15  Exercise 15: TG level 8 SL squats 10x ea    Manual:  Soft Tissue Mobilizaton: R hip roller today to decrease point tenderness. Assessment  Assessment: Hip roller initiated today to R hip flexor to decrease point tenderness over ant hip. Will contiue to progress as Pt tolerates. Activity Tolerance  Activity Tolerance: Patient tolerated treatment well    Patient Education  Patient Education: HEP  Pt verbalized/demonstrated good understanding:     [x] Yes         [] No, pt required further clarification.      Post Treatment Pain:  0/10    Plan  Plan Frequency: 2x/week  Plan weeks: 6 weeks     Goals  (Total # of Visits to Date: 6)      Short Term Goals  Time Frame for Short term goals: 3 weeks  Short term goal 1: Pt to initate HEP -MET  Short term goal 2: Pt to not exceed 7/10 pain to improve functional capacity for ADLs    Long Term Goals  Time Frame for Long term goals : 6 weeks  Long term goal 1: Pt to not exceed 4/10 pain to improve functional capacity for ADLs  Long term goal 2: Pt to be comfortable and complient with HEP  Long term goal 3: Pt to demonstrate >/= 100 degrees Hip Flexion B to improve functional mobility and decrease pressure on posterior structures. Long term goal 4: Pt to improve R hip flexion MMT to >/= 4/5 to improve safety of gait and other wb ADLs.     Minutes Tracking:  Time In: 8584  [facility] Out: [facility]  Minutes: 43  Timed Code Treatment Minutes: [address] [place], [place]     Date: 9/9/2022

## 2022-09-13 ENCOUNTER — HOSPITAL ENCOUNTER (OUTPATIENT)
Dept: PHYSICAL THERAPY | Age: 47
Setting detail: THERAPIES SERIES
Discharge: HOME | End: 2022-09-13
Payer: MEDICARE

## 2022-09-13 PROCEDURE — 97140 MANUAL THERAPY 1/> REGIONS: CPT

## 2022-09-13 PROCEDURE — 97110 THERAPEUTIC EXERCISES: CPT

## 2022-09-14 NOTE — PROGRESS NOTES
Phone: John           Fax: [phone]                           Outpatient Physical Therapy                                                                            Daily Note    Patient: Daisy Puga : 1975  CSN #: [id number]   Referring Physician: Giuseppe Joseph MD    Date: 2022     Treatment Diagnosis: R Hip Pain, Hip OA    Onset Date: 08/15/22  PT Insurance Information: Medicare  Total # of Visits Approved: 12 Per Physician Order  Total # of Visits to Date: 7  No Show: 0  Canceled Appointment: 0    Pre-Treatment Pain:  0/10  Subjective: Pt reports he is still a little sore still in the front of his R hip. Pt denies pain rating just has soreness here and there. Exercises:  Exercise 1: HEP: LTR, MET, Posterior chain stretching  Exercise 2: Scifit 8min Level 3  Exercise 7: Hip flexor stretch supine at eob 3x15\" - step hip flexor stretch today. Exercise 8: lateral band walks YTB 2 laps / Monster walks YTB 2 laps  Exercise 10: Prone rectus stretch 2x45\"  Exercise 11: FSU/ LSU 15x ea 6 inch  Exercise 12: sit<>stands 15x  Exercise 13: toe raise with bottom on counter 15x  Exercise 14: Startrac HS curls 6pl DL 2x15  Exercise 15: TG level 8 SL squats 10x ea    Manual:  Soft Tissue Mobilizaton: R hip roller today to decrease point tenderness. Assessment  Assessment: Continued reviewing HEP use and duration of exercises to decrease stress placed on R hip flexor with overuse. Slight antalgic gait cycle remains with amb around clinic. Will continue. Activity Tolerance  Activity Tolerance: Patient tolerated treatment well    Patient Education  Patient Education: HEP  Pt verbalized/demonstrated good understanding:     [x] Yes         [] No, pt required further clarification.      Post Treatment Pain:  0/10    Plan  Plan Frequency: 2x/week  Plan weeks: 6 weeks     Goals  (Total # of Visits to Date: 7)      Short Term Goals  Time Frame for Short term goals: 3 weeks  Short term goal 1: Pt to initate HEP -MET  Short term goal 2: Pt to not exceed 7/10 pain to improve functional capacity for ADLs    Long Term Goals  Time Frame for Long term goals : 6 weeks  Long term goal 1: Pt to not exceed 4/10 pain to improve functional capacity for ADLs  Long term goal 2: Pt to be comfortable and complient with HEP  Long term goal 3: Pt to demonstrate >/= 100 degrees Hip Flexion B to improve functional mobility and decrease pressure on posterior structures. Long term goal 4: Pt to improve R hip flexion MMT to >/= 4/5 to improve safety of gait and other wb ADLs.     Minutes Tracking:  Time In: 6565  Time Out: 1402  Minutes: 45  Timed Code Treatment Minutes: [address], [place]     Date: 9/13/2022

## 2022-09-15 ENCOUNTER — HOSPITAL ENCOUNTER (OUTPATIENT)
Dept: PHYSICAL THERAPY | Age: 47
Setting detail: THERAPIES SERIES
Discharge: HOME | End: 2022-09-15
Payer: MEDICARE

## 2022-09-15 PROCEDURE — 97110 THERAPEUTIC EXERCISES: CPT

## 2022-09-15 NOTE — PROGRESS NOTES
Phone: John           Fax: [phone]                           Outpatient Physical Therapy                                                                            Daily Note    Patient: Radha Colon : 1975  CSN #: [id number]   Referring Physician: Tracy Montes MD    Date: 9/15/2022     Treatment Diagnosis: R Hip Pain, Hip OA    Onset Date: 08/15/22  PT Insurance Information: Medicare  Total # of Visits Approved: 12 Per Physician Order  Total # of Visits to Date: 8  No Show: 0  Canceled Appointment: 0    Pre-Treatment Pain:  0/10  Subjective: Pt states he still gets a little sore but hes been doing exercises alot at home. Pt denies pain rating just sore at times. Exercises:  Exercise 1: HEP: LTR, MET, Posterior chain stretching  Exercise 2: Scifit 8min Level 3  Exercise 8: lateral band walks YTB 2 laps / Monster walks YTB 2 laps  Exercise 11: FSU/ LSU 15x ea 6 inch  Exercise 12: sit<>stands 15x  Exercise 13: toe raise with bottom on counter 15x  Exercise 14: Startrac HS curls 6pl DL 2x15  Exercise 15: TG level 8 SL squats 10x ea  Exercise 17: Yuan retro pulls 5x 13.5    Assessment  Assessment: Continued advancing hip strengthening program with fair tolerance noted. Pt continues to display R toe out and hip circumduction with gait cycle. Activity Tolerance  Activity Tolerance: Patient tolerated treatment well    Patient Education  Patient Education: HEP  Pt verbalized/demonstrated good understanding:     [x] Yes         [] No, pt required further clarification.      Post Treatment Pain:  0/10    Plan  Plan Frequency: 2x/week  Plan weeks: 6 weeks     Goals  (Total # of Visits to Date: 8)      Short Term Goals  Time Frame for Short term goals: 3 weeks  Short term goal 1: Pt to initate HEP -MET  Short term goal 2: Pt to not exceed 7/10 pain to improve functional capacity for ADLs    Long Term Goals  Time Frame for Long term goals : 6 weeks  Long term goal 1: Pt to not exceed 4/10 pain to improve functional capacity for ADLs  Long term goal 2: Pt to be comfortable and complient with HEP  Long term goal 3: Pt to demonstrate >/= 100 degrees Hip Flexion B to improve functional mobility and decrease pressure on posterior structures. Long term goal 4: Pt to improve R hip flexion MMT to >/= 4/5 to improve safety of gait and other wb ADLs.     Minutes Tracking:  Time In: 1316  Time Out: 1400  Minutes: 44  Timed Code Treatment Minutes: Dipika [place]     Date: 9/15/2022

## 2022-09-20 ENCOUNTER — HOSPITAL ENCOUNTER (OUTPATIENT)
Dept: PHYSICAL THERAPY | Age: 47
Setting detail: THERAPIES SERIES
Discharge: HOME | End: 2022-09-20
Payer: MEDICARE

## 2022-09-20 PROCEDURE — 97110 THERAPEUTIC EXERCISES: CPT

## 2022-09-21 NOTE — PROGRESS NOTES
Phone: John           Fax: [phone]                           Outpatient Physical Therapy                                                                            Daily Note    Patient: Maria Isabel Li : 1975  CSN #: [id number]   Referring Physician: Rajani Brothers MD    Date: 2022       Treatment Diagnosis: R Hip Pain, Hip OA    Onset Date: 08/15/22  PT Insurance Information: Medicare  Total # of Visits Approved: 12 Per Physician Order  Total # of Visits to Date: 9  No Show: 0  Canceled Appointment: 0    Pre-Treatment Pain:  0/10  Subjective: Pt reports hes been working out at home and stretching with pain feeling like its improving but still sore at times. Pt denies current pain. Exercises:  Exercise 1: HEP: LTR, MET, Posterior chain stretching  Exercise 2: Scifit 8min Level 3  Exercise 6: LTR/DKTC on ball x15 ea  Exercise 7: Hip flexor stretch supine at eob 3x15\" - step hip flexor stretch today. Exercise 8: lateral band walks YTB 2 laps / Monster walks YTB 2 laps  Exercise 11: FSU/ LSU 15x ea 6 inch  Exercise 12: sit<>stands 15x  Exercise 14: Startrac HS curls 6pl DL 2x15  Exercise 15: TG level 8 SL squats 10x ea  Exercise 17: Yuan retro pulls 5x 13.5    Assessment  Assessment: Moderate hip flexion ROM restriction noted with SKTC exercises. Decreased pain noted in R hip flexor today with eccentric lowering. Activity Tolerance  Activity Tolerance: Patient tolerated treatment well    Patient Education  Patient Education: HEP  Pt verbalized/demonstrated good understanding:     [x] Yes         [] No, pt required further clarification.      Post Treatment Pain:  0/10    Plan  Plan Frequency: 2x/week  Plan weeks: 6 weeks     Goals  (Total # of Visits to Date: 5)      Short Term Goals  Time Frame for Short term goals: 3 weeks  Short term goal 1: Pt to initate HEP -MET  Short term goal 2: Pt to not exceed 7/10 pain to improve functional capacity for ADLs    Long Term Goals  Time Frame for Long term goals : 6 weeks  Long term goal 1: Pt to not exceed 4/10 pain to improve functional capacity for ADLs  Long term goal 2: Pt to be comfortable and complient with HEP  Long term goal 3: Pt to demonstrate >/= 100 degrees Hip Flexion B to improve functional mobility and decrease pressure on posterior structures. Long term goal 4: Pt to improve R hip flexion MMT to >/= 4/5 to improve safety of gait and other wb ADLs.     Minutes Tracking:  Time In: 1078  Time Out: 1401  Minutes: 44  Timed Code Treatment Minutes: [address] [place], [place]     Date: 9/20/2022

## 2022-09-22 ENCOUNTER — HOSPITAL ENCOUNTER (OUTPATIENT)
Dept: PHYSICAL THERAPY | Age: 47
Setting detail: THERAPIES SERIES
Discharge: HOME | End: 2022-09-22
Payer: MEDICARE

## 2022-09-22 PROCEDURE — 97110 THERAPEUTIC EXERCISES: CPT

## 2022-09-23 NOTE — PROGRESS NOTES
Phone: John           Fax: [phone]                           Outpatient Physical Therapy                                                                            Daily Note    Patient: Larissa Treadwell : 1975  CSN #: [id number]   Referring Physician: Carie Burroughs MD    Date: 2022     Treatment Diagnosis: R Hip Pain, Hip OA    Onset Date: 08/15/22  PT Insurance Information: Medicare  Total # of Visits Approved: 12 Per Physician Order  Total # of Visits to Date: 10  No Show: 0  Canceled Appointment: 0    Pre-Treatment Pain:  0/10  Subjective: Pt states hes been stretching and working at home. Feels hes doing a little better but still tight. Pt rates current pain a 0/10. Exercises:  Exercise 1: HEP: LTR, MET, Posterior chain stretching  Exercise 2: Scifit 8min Level 3  Exercise 6: LTR/DKTC on ball x15 ea  Exercise 7: Hip flexor stretch supine at eob 3x15\" - step hip flexor stretch today. Exercise 8: lateral band walks YTB 2 laps / Monster walks YTB 2 laps  Exercise 11: FSU/ LSU 15x ea 6 inch  Exercise 12: sit<>stands 15x  Exercise 15: TG level 8 SL squats 10x ea    Manual:  Manual Traction: gentle hip ROM    Assessment  Assessment: Continued manual aimed at greater hip mobility along with stabilization exercises. Hard end feel noted with compensation through LB noted during deeper squatting exercises. Activity Tolerance  Activity Tolerance: Patient tolerated treatment well    Patient Education  Patient Education: HEP  Pt verbalized/demonstrated good understanding:     [x] Yes         [] No, pt required further clarification.      Post Treatment Pain:  0/10    Plan  Plan Frequency: 2x/week  Plan weeks: 6 weeks     Goals  (Total # of Visits to Date: 10)      Short Term Goals  Time Frame for Short term goals: 3 weeks  Short term goal 1: Pt to initate HEP -MET  Short term goal 2: Pt to not exceed 7/10 pain to improve functional capacity for ADLs    Long Term Goals  Time Frame for Long term goals : 6 weeks  Long term goal 1: Pt to not exceed 4/10 pain to improve functional capacity for ADLs  Long term goal 2: Pt to be comfortable and complient with HEP  Long term goal 3: Pt to demonstrate >/= 100 degrees Hip Flexion B to improve functional mobility and decrease pressure on posterior structures. Long term goal 4: Pt to improve R hip flexion MMT to >/= 4/5 to improve safety of gait and other wb ADLs.     Minutes Tracking:  Time In: 6571  Time Out: 1400  Minutes: 43  Timed Code Treatment Minutes: [address] [place], [place]     Date: 9/22/2022

## 2022-09-28 ENCOUNTER — HOSPITAL ENCOUNTER (OUTPATIENT)
Dept: PHYSICAL THERAPY | Age: 47
Setting detail: THERAPIES SERIES
Discharge: HOME | End: 2022-09-28
Payer: MEDICARE

## 2022-09-28 PROCEDURE — 97110 THERAPEUTIC EXERCISES: CPT

## 2022-09-28 PROCEDURE — 97140 MANUAL THERAPY 1/> REGIONS: CPT

## 2022-09-28 NOTE — PROGRESS NOTES
Phone: John           Fax: [phone]                           Outpatient Physical Therapy                                                                            Daily Note    Patient: Santi Carranza : 1975  CSN #: [id number]   Referring Physician: Kyle Geller MD    Date: 2022    Diagnosis: M16.11, M16.12, Primary OA R, L  Treatment Diagnosis: R Hip Pain, Hip OA    Onset Date: 08/15/22  PT Insurance Information: Medicare  Total # of Visits Approved: 12 Per Physician Order  Total # of Visits to Date: 11  No Show: 0  Canceled Appointment: 0      Pre-Treatment Pain:  0/10  Subjective: Pt reports he did some stretching earlier today; some tightness remains. Exercises:  Exercise 1: HEP: LTR, MET, Posterior chain stretching  Exercise 2: Scifit 8min Level 3  Exercise 6: LTR/DKTC on ball x15 ea  Exercise 7: Hip flexor stretch supine at eob 3x15\" - step hip flexor stretch today. Exercise 8: lateral band walks YTB 2 laps / Monster walks YTB 2 laps  Exercise 11: FSU/ LSU 15x ea 6 inch  Exercise 12: sit<>stands 15x  Exercise 14: Startrac HS curls 6pl DL 2x15  Exercise 15: TG level 8 SL squats 15x ea    Manual:  Manual Traction: gentle hip ROM    Assessment  Assessment: Patient with improved B hip ROM flexion R: 85*, L:96* progressing towards LTG. Pt tolerated manual stretching well. Will continue. Activity Tolerance  Activity Tolerance: Patient tolerated treatment well    Patient Education  Exercise technique, progression towards ROM goal   Pt verbalized/demonstrated good understanding:     [x] Yes         [] No, pt required further clarification.        Post Treatment Pain:  0/10      Plan  Plan Frequency: 2x/week  Plan weeks: 6 weeks       Goals  (Total # of Visits to Date: 6)      Short Term Goals  Time Frame for Short term goals: 3 weeks  Short term goal 1: Pt to initate HEP -MET  Short term goal 2: Pt to not exceed 7/10 pain to improve functional capacity for ADLs-met    Long Term Goals  Time Frame for Long term goals : 6 weeks  Long term goal 1: Pt to not exceed 4/10 pain to improve functional capacity for ADLs  Long term goal 2: Pt to be comfortable and complient with HEP  Long term goal 3: Pt to demonstrate >/= 100 degrees Hip Flexion B to improve functional mobility and decrease pressure on posterior structures. -progressing (L: 96*, R: 85*)  Long term goal 4: Pt to improve R hip flexion MMT to >/= 4/5 to improve safety of gait and other wb ADLs.     Minutes Tracking:  Time In: 1656  Time Out: 6701  Minutes: 41  Timed Code Treatment Minutes: [address], [place], DPT     Date: 9/28/2022

## 2022-09-30 ENCOUNTER — HOSPITAL ENCOUNTER (OUTPATIENT)
Dept: PHYSICAL THERAPY | Age: 47
Setting detail: THERAPIES SERIES
Discharge: HOME | End: 2022-09-30
Payer: MEDICARE

## 2022-09-30 PROCEDURE — 97110 THERAPEUTIC EXERCISES: CPT

## 2022-09-30 NOTE — PLAN OF CARE
[facility]           Phone: [phone]             Outpatient Physical Therapy  Fax: [phone]                                           Date: 2022  Patient: Radha Naylor : 1975 CSN #: [id number]   Referring Physician: Enma Peters MD      [] Plan of Care   [x] Updated Plan of Care    Dates of Service to Include: 2022 to 22    Diagnosis:  M16.11, M16.12, Primary OA R, L    Rehab (Treatment) Diagnosis:  R Hip Pain, Hip OA             Onset Date:  08/15/22    Attendance  Total # of Visits to Date: 12 No Show: 0 Canceled Appointment: 0    Assessment  Assessment: Pt demonstrates improve hip mobility (Flexion R: 85*, L:96*) as well as improved B hip strength (4+/5 grossly on R). Pt has 6/10 pain at worst at this time but is normally not painful. Pt has made good progress with PT and would like to continue to work on improving hip flexibility and reducing pain with functional acitivity. Pt would benefit from skilled therapy to continue to improve functional mobility and decrease pain with activity. Goals  Short Term Goals  Time Frame for Short Term Goals: 3 weeks  Short Term Goal 1: Pt to initate HEP -MET  Short Term Goal 2: Pt to not exceed 7/10 pain to improve functional capacity for ADLs-met  Long Term Goals  Long Term Goal 1: Pt to not exceed 4/10 pain to improve functional capacity for ADLs - progressing (6/10)  Long Term Goal 2: Pt to be comfortable and complient with HEP - met  Long Term Goal 3: Pt to demonstrate >/= 100 degrees Hip Flexion B to improve functional mobility and decrease pressure on posterior structures. -progressing (L: 96*, R: 85*)  Long Term Goal 4: Pt to improve R hip flexion MMT to >/= 4/5 to improve safety of gait and other wb ADLs.  MET (4+/5 - 5/5 RLE MMT)     Prognosis  Therapy Prognosis: Good    Treatment Plan   Plan Frequency: 2x/week  Plan weeks: 6 weeks  [x] HP/CP      [x] Electrical Stim   [x] Therapeutic Exercise      [x] Gait Training  [] Aquatics   [] Ultrasound         [x] Patient Education/HEP   [x] Manual Therapy  [] Traction    [x] Neuro-nabil        [x] Soft Tissue Mobs            [] Home TENS  [] Iontophoresis    [] Orthotic casting/fitting      [x] Dry Needling             Electronically signed by: Candelario Todd PT DPT    Date: 9/30/2022      ______________________________________ Date: 9/30/2022   Physician Signature

## 2022-09-30 NOTE — PROGRESS NOTES
Phone: John           Fax: [phone]                           Outpatient Physical Therapy                                                                            Daily Note    Patient: Faustino Gonsalez : 1975  CSN #: [id number]   Referring Physician: Astrid Samano MD    Date: 2022    Diagnosis: M16.11, M16.12, Primary OA R, L  Treatment Diagnosis: R Hip Pain, Hip OA    Onset Date: 08/15/22  PT Insurance Information: Medicare  Total # of Visits Approved: 24 Per Physician Order  Total # of Visits to Date: 12  No Show: 0  Canceled Appointment: 0      Pre-Treatment Pain:  0/10  Subjective: Pt states he feels like he has come a long way but is still having mild issues he would like to try to work through. No pain this date. Exercises:  Exercise 1: HEP: LTR, MET, Posterior chain stretching  Exercise 2: Scifit 8min Level 3  Exercise 3: Hip rotation stretching IR/ER on stool 15x ea  Exercise 4: Hip Hikes 10x B  Exercise 6: LTR/DKTC on ball x15 ea  Exercise 9: Supine Hip ER stretch (fallout with OP) 4x15\"  Re-eval tests and measures this date    Assessment  Assessment: Pt demonstrates improve hip mobility (Flexion R: 85*, L:96*) as well as improved B hip strength (4+/5 grossly on R). Pt has 6/10 pain at worst at this time but is normally not painful. Pt has made good progress with PT and would like to continue to work on improving hip flexibility and reducing pain with functional acitivity. Pt would benefit from skilled therapy to continue to improve functional mobility and decrease pain with activity. Activity Tolerance  Activity Tolerance: Patient tolerated treatment well    Patient Education  Patient Education: Mancel Folds  Pt verbalized/demonstrated good understanding:     [x] Yes         [] No, pt required further clarification.        Post Treatment Pain:  0/10      Plan  Plan Frequency: 2x/week  Plan weeks: 6 weeks       Goals  (Total # of Visits to Date: 15)      Short Term Goals  Time Frame for Short Term Goals: 3 weeks  Short Term Goal 1: Pt to initate HEP -MET  Short Term Goal 2: Pt to not exceed 7/10 pain to improve functional capacity for ADLs-met    Long Term Goals  Long Term Goal 1: Pt to not exceed 4/10 pain to improve functional capacity for ADLs - progressing (6/10)  Long Term Goal 2: Pt to be comfortable and complient with HEP - met  Long Term Goal 3: Pt to demonstrate >/= 100 degrees Hip Flexion B to improve functional mobility and decrease pressure on posterior structures. -progressing (L: 96*, R: 85*)  Long Term Goal 4: Pt to improve R hip flexion MMT to >/= 4/5 to improve safety of gait and other wb ADLs.  MET (4+/5 - 5/5 RLE MMT)    Minutes Tracking:  Time In: 1421  Time Out: 1501  Minutes: 40  Timed Code Treatment Minutes: 115 [facility], PT     Date: 9/30/2022

## 2022-10-03 ENCOUNTER — HOSPITAL ENCOUNTER (OUTPATIENT)
Dept: PHYSICAL THERAPY | Age: 47
Setting detail: THERAPIES SERIES
Discharge: HOME | End: 2022-10-03
Payer: MEDICARE

## 2022-10-03 PROCEDURE — 97140 MANUAL THERAPY 1/> REGIONS: CPT

## 2022-10-03 PROCEDURE — 97110 THERAPEUTIC EXERCISES: CPT

## 2022-10-03 NOTE — PROGRESS NOTES
Phone: [phone]                 [facility]           Fax: [phone]                           Outpatient Physical Therapy                                                                            Daily Note    Patient: Myrna Brady : 1975  [facility] #: [id number]   Referring Physician: Corine Luther MD    Date: 10/3/2022       Treatment Diagnosis: R Hip Pain, Hip OA    Onset Date: 08/15/22  PT Insurance Information: Medicare  Total # of Visits Approved: 24 Per Physician Order  Total # of Visits to Date: 13  No Show: 0  Canceled Appointment: 0      Pre-Treatment Pain:  0/10       Exercises:  Exercise 1: HEP: LTR, MET, Posterior chain stretching  Exercise 2: Scifit 10min Level 3  Exercise 3: Hip rotation stretching IR/ER on stool 15x ea  Exercise 4: Hip Hikes 10x B 6\" hurddle  Exercise 6: LTR/DKTC on ball x15 ea  Exercise 8: lateral band walks BTB 2 laps / Monster walks BTB 2 laps  Exercise 9: Supine Hip ER stretch (fallout with OP) 4x15\"  Exercise 11: FSU/ LSU 15x ea 6 inch    Manual:  Manual Traction: gentle hip ROM    Modalities:       Assessment  Body Structures, Functions, Activity Limitations Requiring Skilled Therapeutic Intervention: Decreased ADL status, Decreased ROM, Decreased body mechanics, Decreased strength, Decreased tolerance to work activity, Decreased endurance, Decreased high-level IADLs, Increased pain  Assessment: Increased resistance exercises with BTb with no reports of increased pain. Noted minimal pain with hip hike 6\" hurddles. Pt. toelrated tx. well, will continue to progress per pt. tolerance. Activity Tolerance  Activity Tolerance: Patient tolerated treatment well    Patient Education  Patient Education: HEP  Pt verbalized/demonstrated good understanding:     [x] Yes         [] No, pt required further clarification.        Post Treatment Pain:  0/10      Plan  Plan Frequency: 2x/week  Plan weeks: 6 weeks       Goals  (Total # of Visits to Date: 15)      Short Term Goals  Time Frame for Short Term Goals: 3 weeks  Short Term Goal 1: Pt to initate HEP -MET  Short Term Goal 2: Pt to not exceed 7/10 pain to improve functional capacity for ADLs-met    Long Term Goals  Time Frame for Long Term Goals : 6 weeks  Long Term Goal 1: Pt to not exceed 4/10 pain to improve functional capacity for ADLs - progressing (6/10)  Long Term Goal 2: Pt to be comfortable and complient with HEP - met  Long Term Goal 3: Pt to demonstrate >/= 100 degrees Hip Flexion B to improve functional mobility and decrease pressure on posterior structures. -progressing (L: 96*, R: 85*)  Long Term Goal 4: Pt to improve R hip flexion MMT to >/= 4/5 to improve safety of gait and other wb ADLs.  MET (4+/5 - 5/5 RLE MMT)    Minutes Tracking:  Time In: 5061  Time Out: 4011  Minutes: Ziyad Pérez PTA     Date: 10/3/2022

## 2022-10-05 ENCOUNTER — HOSPITAL ENCOUNTER (OUTPATIENT)
Dept: PHYSICAL THERAPY | Age: 47
Setting detail: THERAPIES SERIES
Discharge: HOME | End: 2022-10-05
Payer: MEDICARE

## 2022-10-05 PROCEDURE — 97110 THERAPEUTIC EXERCISES: CPT

## 2022-10-06 NOTE — PROGRESS NOTES
Phone: John           Fax: [phone]                           Outpatient Physical Therapy                                                                            Daily Note    Patient: Mendel Mackintosh : 1975  CSN #: [id number]   Referring Physician: So Cardenas MD    Date: 10/5/2022       Treatment Diagnosis: R Hip Pain, Hip OA    Onset Date: 08/15/22  PT Insurance Information: Medicare  Total # of Visits Approved: 24 Per Physician Order  Total # of Visits to Date: 14  No Show: 0  Canceled Appointment: 0    Pre-Treatment Pain:  0/10  Subjective: Pt reports he feels like he gets better motion since begining therapy but he still is tight. Pt denies current pain. Exercises:  Exercise 1: HEP: LTR, MET, Posterior chain stretching  Exercise 2: Scifit 10min Level 3  Exercise 3: Hip rotation stretching IR/ER on stool 15x ea  Exercise 4: Hip Hikes 10x B 6\" hurddle  Exercise 6: LTR/DKTC on ball x15 ea  Exercise 8: lateral band walks BTB 2 laps / Monster walks BTB 2 laps  Exercise 9: Supine Hip ER stretch (fallout with OP) 4x15\"  Exercise 11: FSU/ LSU 15x ea 6 inch  Exercise 12: sit<>stands 15x  Exercise 17: [place] retro pulls 5x 13.5  Exercise 18: GTB 4 way hip  15 x ea    Assessment  Assessment: Continued working on [place] hip strengthening and ROM program with fair tolerance noted. Hip flexion ROM remains limited. Will continue. Activity Tolerance  Activity Tolerance: Patient tolerated treatment well    Patient Education  Patient Education: HEP  Pt verbalized/demonstrated good understanding:     [x] Yes         [] No, pt required further clarification.      Post Treatment Pain:  0/10    Plan  Plan Frequency: 2x/week  Plan weeks: 6 weeks     Goals  (Total # of Visits to Date: 15)      Short Term Goals  Time Frame for Short Term Goals: 3 weeks  Short Term Goal 1: Pt to initate HEP -MET  Short Term Goal 2: Pt to not exceed 7/10 pain to improve functional capacity for ADLs-met    Long Term Goals  Time Frame for Long Term Goals : 6 weeks  Long Term Goal 1: Pt to not exceed 4/10 pain to improve functional capacity for ADLs - progressing (6/10)  Long Term Goal 2: Pt to be comfortable and complient with HEP - met  Long Term Goal 3: Pt to demonstrate >/= 100 degrees Hip Flexion B to improve functional mobility and decrease pressure on posterior structures. -progressing (L: 96*, R: 85*)  Long Term Goal 4: Pt to improve R hip flexion MMT to >/= 4/5 to improve safety of gait and other wb ADLs.  MET (4+/5 - 5/5 RLE MMT)    Minutes Tracking:  Time In: 1648  Time Out: 7819  Minutes: 46  Timed Code Treatment Minutes: 2[address] [place]     Date: 10/5/2022

## 2022-10-13 ENCOUNTER — HOSPITAL ENCOUNTER (OUTPATIENT)
Dept: PHYSICAL THERAPY | Age: 47
Setting detail: THERAPIES SERIES
Discharge: HOME | End: 2022-10-13
Payer: MEDICARE

## 2022-10-13 PROCEDURE — 97110 THERAPEUTIC EXERCISES: CPT

## 2022-10-13 NOTE — PROGRESS NOTES
Phone: John           Fax: [phone]                           Outpatient Physical Therapy                                                                            Daily Note    Patient: Bin German : 1975  [facility] #: [id number]   Referring Physician: Nina Johnson MD    Date: 10/13/2022       Treatment Diagnosis: R Hip Pain, Hip OA    Onset Date: 08/15/22  PT Insurance Information: Medicare  Total # of Visits Approved: 24 Per Physician Order  Total # of Visits to Date: 15  No Show: 0  Canceled Appointment: 0      Pre-Treatment Pain:  0/10  Subjective: Pt states he had a few days of being increasingly stiff, but he modified his activity and was able to return back to baseline. Feels tight with no pain to note this date. Exercises:  Exercise 2: Scifit 7min Level 3  Exercise 5: SLS trampoline toss x10 B  Exercise 7: Hip flexor stretch at step 3x15\"  Exercise 10: Slider Lunges x8 B  Exercise 13: [place] against wall squat x10  Exercise 15: Leg Press DL/SL 9-11 Plates 3x8 ea    Assessment  Body Structures, Functions, Activity Limitations Requiring Skilled Therapeutic Intervention: Decreased ADL status, Decreased ROM, Decreased body mechanics, Decreased strength, Decreased tolerance to work activity, Decreased endurance, Decreased high-level IADLs, Increased pain  Assessment: Pt did very well this date with advanced strengthening therex including leg press and slider lunges. Pt continues to show improvements with SLS as evident by trmapoline toss exercise. Pt is complient with HEP, plan to continue with advanced strengthening as tolerated. Activity Tolerance  Activity Tolerance: Patient tolerated treatment well    Patient Education  Patient Education: HEP  Pt verbalized/demonstrated good understanding:     [x] Yes         [] No, pt required further clarification.        Post Treatment Pain:  0/10      Plan  Plan Frequency: 2x/week  Plan weeks: 6 weeks       Goals  (Total # of Visits to Date: 13)      Short Term Goals  Time Frame for Short Term Goals: 3 weeks  Short Term Goal 1: Pt to initate HEP -MET  Short Term Goal 2: Pt to not exceed 7/10 pain to improve functional capacity for ADLs-met    Long Term Goals  Time Frame for Long Term Goals : 6 weeks  Long Term Goal 1: Pt to not exceed 4/10 pain to improve functional capacity for ADLs - progressing (6/10)  Long Term Goal 2: Pt to be comfortable and complient with HEP - met  Long Term Goal 3: Pt to demonstrate >/= 100 degrees Hip Flexion B to improve functional mobility and decrease pressure on posterior structures. -progressing (L: 96*, R: 85*)  Long Term Goal 4: Pt to improve R hip flexion MMT to >/= 4/5 to improve safety of gait and other wb ADLs.  MET (4+/5 - 5/5 RLE MMT)    Minutes Tracking:  Time In: 1630  Time Out: 1711  Minutes: 41  Timed Code Treatment Minutes: 123 [facility] [facility] Ocampo, PT     Date: 10/13/2022

## 2022-10-14 ENCOUNTER — OFFICE VISIT (OUTPATIENT)
Dept: PRIMARY CARE CLINIC | Age: 47
End: 2022-10-14
Payer: MEDICARE

## 2022-10-14 ENCOUNTER — HOSPITAL ENCOUNTER (OUTPATIENT)
Dept: PHYSICAL THERAPY | Age: 47
Setting detail: THERAPIES SERIES
Discharge: HOME | End: 2022-10-14
Payer: MEDICARE

## 2022-10-14 VITALS
WEIGHT: 195.4 LBS | HEIGHT: 67 IN | OXYGEN SATURATION: 98 % | TEMPERATURE: 97.9 F | SYSTOLIC BLOOD PRESSURE: 122 MMHG | DIASTOLIC BLOOD PRESSURE: 74 MMHG | HEART RATE: 91 BPM | BODY MASS INDEX: 30.67 KG/M2 | RESPIRATION RATE: 18 BRPM

## 2022-10-14 DIAGNOSIS — I10 ESSENTIAL HYPERTENSION: ICD-10-CM

## 2022-10-14 DIAGNOSIS — E11.42 CONTROLLED TYPE 2 DIABETES MELLITUS WITH DIABETIC POLYNEUROPATHY, WITHOUT LONG-TERM CURRENT USE OF INSULIN (HCC): Primary | ICD-10-CM

## 2022-10-14 DIAGNOSIS — E78.5 DYSLIPIDEMIA: ICD-10-CM

## 2022-10-14 DIAGNOSIS — Z12.11 COLON CANCER SCREENING: ICD-10-CM

## 2022-10-14 LAB — HBA1C MFR BLD: 5 %

## 2022-10-14 PROCEDURE — 97110 THERAPEUTIC EXERCISES: CPT

## 2022-10-14 PROCEDURE — G8427 DOCREV CUR MEDS BY ELIG CLIN: HCPCS | Performed by: NURSE PRACTITIONER

## 2022-10-14 PROCEDURE — 99214 OFFICE O/P EST MOD 30 MIN: CPT | Performed by: NURSE PRACTITIONER

## 2022-10-14 PROCEDURE — 3044F HG A1C LEVEL LT 7.0%: CPT | Performed by: NURSE PRACTITIONER

## 2022-10-14 PROCEDURE — 97140 MANUAL THERAPY 1/> REGIONS: CPT

## 2022-10-14 PROCEDURE — 2022F DILAT RTA XM EVC RTNOPTHY: CPT | Performed by: NURSE PRACTITIONER

## 2022-10-14 PROCEDURE — 83036 HEMOGLOBIN GLYCOSYLATED A1C: CPT | Performed by: NURSE PRACTITIONER

## 2022-10-14 PROCEDURE — G8417 CALC BMI ABV UP PARAM F/U: HCPCS | Performed by: NURSE PRACTITIONER

## 2022-10-14 PROCEDURE — G8484 FLU IMMUNIZE NO ADMIN: HCPCS | Performed by: NURSE PRACTITIONER

## 2022-10-14 PROCEDURE — 1036F TOBACCO NON-USER: CPT | Performed by: NURSE PRACTITIONER

## 2022-10-14 ASSESSMENT — ENCOUNTER SYMPTOMS
WHEEZING: 0
RHINORRHEA: 0
BLURRED VISION: 0
BACK PAIN: 0
SHORTNESS OF BREATH: 0
CONSTIPATION: 0
ORTHOPNEA: 0
SORE THROAT: 0
DIARRHEA: 0
ABDOMINAL PAIN: 0
NAUSEA: 0
VOMITING: 0
COUGH: 0

## 2022-10-14 ASSESSMENT — PATIENT HEALTH QUESTIONNAIRE - PHQ9
2. FEELING DOWN, DEPRESSED OR HOPELESS: 0
SUM OF ALL RESPONSES TO PHQ QUESTIONS 1-9: 0
SUM OF ALL RESPONSES TO PHQ9 QUESTIONS 1 & 2: 0
SUM OF ALL RESPONSES TO PHQ QUESTIONS 1-9: 0
1. LITTLE INTEREST OR PLEASURE IN DOING THINGS: 0

## 2022-10-14 NOTE — PROGRESS NOTES
Name: Citlali Liao  : 1975         Chief Complaint:     Chief Complaint   Patient presents with    Diabetes     Routine no concerns     Hypertension    Hyperlipidemia       History of Present Illness:      Citlali Liao is a 52 y.o.  male who presents with Diabetes (Routine no concerns ), Hypertension, and Hyperlipidemia      Ronald Amato is here today for a routine office visit. Diabetes  He presents for his follow-up diabetic visit. He has type 2 diabetes mellitus. Onset time: YEARS. His disease course has been stable. There are no hypoglycemic associated symptoms. Pertinent negatives for hypoglycemia include no dizziness, headaches, nervousness/anxiousness or sleepiness. Pertinent negatives for diabetes include no blurred vision, no chest pain, no fatigue, no foot paresthesias, no foot ulcerations, no polydipsia, no polyphagia, no polyuria, no weakness and no weight loss. There are no hypoglycemic complications. Symptoms are stable. Pertinent negatives for diabetic complications include no CVA, heart disease, nephropathy or peripheral neuropathy. Risk factors for coronary artery disease include diabetes mellitus, dyslipidemia, hypertension, male sex and obesity. Current diabetic treatment includes oral agent (dual therapy) and diet. He is compliant with treatment all of the time. His weight is stable. He is following a generally healthy diet. Meal planning includes avoidance of concentrated sweets. He has had a previous visit with a dietitian. He participates in exercise three times a week. There is no change in his home blood glucose trend. His breakfast blood glucose range is generally 110-130 mg/dl. An ACE inhibitor/angiotensin II receptor blocker is being taken. He does not see a podiatrist.Eye exam is not current. Hyperlipidemia  This is a chronic problem. The current episode started more than 1 year ago. The problem is uncontrolled. Recent lipid tests were reviewed and are high.  Exacerbating diseases include diabetes and obesity. He has no history of chronic renal disease, hypothyroidism or liver disease. Factors aggravating his hyperlipidemia include fatty foods. Pertinent negatives include no chest pain, leg pain, myalgias or shortness of breath. Current antihyperlipidemic treatment includes statins. The current treatment provides moderate improvement of lipids. There are no compliance problems. Risk factors for coronary artery disease include hypertension, male sex, dyslipidemia, diabetes mellitus, obesity and family history. Hypertension  This is a chronic problem. The current episode started more than 1 year ago. The problem is unchanged. The problem is controlled. Pertinent negatives include no blurred vision, chest pain, headaches, malaise/fatigue, neck pain, orthopnea, palpitations, peripheral edema or shortness of breath. There are no associated agents to hypertension. Risk factors for coronary artery disease include diabetes mellitus, dyslipidemia, family history, obesity, male gender, stress and sedentary lifestyle. Past treatments include angiotensin blockers. The current treatment provides moderate improvement. Compliance problems include diet. There is no history of kidney disease, CAD/MI, CVA or heart failure. There is no history of chronic renal disease.        Past Medical History:     Past Medical History:   Diagnosis Date    Hyperlipidemia     Hypertension     Pre-diabetes       Reviewed all health maintenance requirements and ordered appropriate tests  Health Maintenance Due   Topic Date Due    Colorectal Cancer Screen  Never done       Past Surgical History:     Past Surgical History:   Procedure Laterality Date    CARPAL TUNNEL RELEASE Left 06/26/2017    Dr. Felipe Riley N/CARPAL TUNNEL SURG Left 6/26/2017    CARPAL TUNNEL RELEASE performed by Gabriela Byrd MD at [address] [place] [address] N/CARPAL TUNNEL SURG Right 7/17/2017    CARPAL TUNNEL RELEASE performed by Bev Whitaker MD at [address]        Medications:       Prior to Admission medications    Medication Sig Start Date End Date Taking? Authorizing Provider   atorvastatin (LIPITOR) 40 MG tablet TAKE 1 TABLET BY MOUTH EVERY DAY 3/21/22  Yes ARNOLD Allen - CNP   sitaGLIPtan-metFORMIN (JANUMET)  MG per tablet TAKE 1 TABLET BY MOUTH TWICE A DAY WITH MEALS 3/21/22  Yes ARNOLD Allen - CNP   losartan (COZAAR) 100 MG tablet TAKE 1 TABLET BY MOUTH EVERY DAY 3/17/22  Yes Rosa Elena Teague APRN - CNP   baclofen (LIORESAL) 20 MG tablet TAKE 1 TABLET BY MOUTH THREE TIMES A DAY 1/28/22  Yes ARNOLD Allen - CNP   sildenafil (VIAGRA) 50 MG tablet Take 1 tablet by mouth as needed for Erectile Dysfunction 8/12/21  Yes ARNOLD Salinas CNP   PRIVIGEN 40 GM/400ML SOLN  8/26/20  Yes Historical Provider, MD   PRIVIGEN 5 GM/50ML SOLN solution  8/26/20  Yes Historical Provider, MD        Allergies:       Patient has no known allergies. Social History:     Tobacco:    reports that he has never smoked. He has never used smokeless tobacco.  Alcohol:      reports no history of alcohol use. Drug Use:  reports no history of drug use. Family History:     Family History   Problem Relation Age of Onset    Cancer Mother     High Cholesterol Father     Cancer Maternal Grandmother     Stroke Maternal Grandfather     Cancer Paternal Grandfather        Review of Systems:     Positive and Negative as described in HPI    Review of Systems   Constitutional:  Negative for chills, fatigue, fever, malaise/fatigue and weight loss. HENT:  Negative for congestion, rhinorrhea and sore throat. Eyes:  Negative for blurred vision and visual disturbance. Respiratory:  Negative for cough, shortness of breath and wheezing. Cardiovascular:  Negative for chest pain, palpitations and orthopnea.    Gastrointestinal:  Negative for abdominal pain, constipation, diarrhea, nausea and vomiting. Endocrine: Negative for polydipsia, polyphagia and polyuria. Genitourinary:  Negative for difficulty urinating, dysuria, frequency, hematuria and urgency. Musculoskeletal:  Negative for back pain, myalgias, neck pain and neck stiffness. Skin:  Negative for rash. Neurological:  Negative for dizziness, syncope, weakness, light-headedness, numbness and headaches. Psychiatric/Behavioral:  The patient is not nervous/anxious. Physical Exam:   Vitals:  /74 (Site: Left Upper Arm, Position: Sitting)   Pulse 91   Temp 97.9 °F (36.6 °C) (Temporal)   Resp 18   Ht 5' 7\" (1.702 m)   Wt 195 lb 6.4 oz (88.6 kg)   SpO2 98%   BMI 30.60 kg/m²     Physical Exam  Vitals and nursing note reviewed. Constitutional:       General: He is not in acute distress. Appearance: Normal appearance. He is well-developed. He is obese. He is not ill-appearing. HENT:      Mouth/Throat:      Mouth: Mucous membranes are moist.      Pharynx: No posterior oropharyngeal erythema. Eyes:      Conjunctiva/sclera: Conjunctivae normal.   Cardiovascular:      Rate and Rhythm: Normal rate and regular rhythm. Heart sounds: No murmur heard. Pulmonary:      Effort: Pulmonary effort is normal.      Breath sounds: Normal breath sounds. No wheezing. Abdominal:      General: Bowel sounds are normal. There is no distension. Palpations: Abdomen is soft. Tenderness: There is no abdominal tenderness. Musculoskeletal:      Cervical back: Normal range of motion and neck supple. Right lower leg: No edema. Left lower leg: No edema. Skin:     General: Skin is warm and dry. Findings: No rash. Neurological:      Mental Status: He is alert and oriented to person, place, and time. Psychiatric:         Attention and Perception: He is attentive.          Mood and Affect: Mood normal.         Behavior: Behavior normal.       Data:     Lab Results   Component Value Date/Time  04/07/2022 08:18 AM    K 4.8 04/07/2022 08:18 AM     04/07/2022 08:18 AM    CO2 22 04/07/2022 08:18 AM    BUN 20 04/07/2022 08:18 AM    CREATININE 1.12 04/07/2022 08:18 AM    GLUCOSE 81 04/07/2022 08:18 AM    PROT 7.8 09/29/2017 08:54 AM    LABALBU 4.3 09/29/2017 08:54 AM    BILITOT 0.52 09/29/2017 08:54 AM    ALKPHOS 82 09/29/2017 08:54 AM    AST 24 04/07/2022 08:18 AM    ALT 16 04/07/2022 08:18 AM     Lab Results   Component Value Date/Time    WBC 5.6 04/07/2022 08:18 AM    RBC 4.73 04/07/2022 08:18 AM    HGB 13.8 04/07/2022 08:18 AM    HCT 42.5 04/07/2022 08:18 AM    MCV 89.9 04/07/2022 08:18 AM    MCH 29.2 04/07/2022 08:18 AM    MCHC 32.5 04/07/2022 08:18 AM    RDW 14.4 04/07/2022 08:18 AM     04/07/2022 08:18 AM    MPV 10.3 04/07/2022 08:18 AM     Lab Results   Component Value Date/Time    TSH 2.17 03/24/2020 10:19 AM     Lab Results   Component Value Date/Time    CHOL 144 04/07/2022 08:19 AM    HDL 44 04/07/2022 08:19 AM    LABA1C 5.0 10/14/2022 09:20 AM       Assessment/Plan:      Diagnosis Orders   1. Controlled type 2 diabetes mellitus with diabetic polyneuropathy, without long-term current use of insulin (HCC)  POCT glycosylated hemoglobin (Hb A1C)    CBC with Auto Differential    ALT    AST    Basic Metabolic Panel    Hemoglobin A1C    Lipid Panel    Microalbumin, Ur      2. Dyslipidemia        3. Essential hypertension        4. Colon cancer screening  POCT Fecal Immunochemical Test (FIT)        We will continue all current medications. A1c is well controlled. Blood pressure is controlled. He will continue with physical therapy on a routine basis. We will see him back in 6 months with full labs for routine visit, sooner if any issues. 1.  Bhumi Persaud received counseling on the following healthy behaviors: nutrition, exercise, and medication adherence  2. Patient given educational materials - see patient instructions  3.   Was a self-tracking handout given in paper form or via MyChart? No  If yes, see orders or list here. 4.  Discussed use, benefit, and side effects of prescribed medications. Barriers to medication compliance addressed. All patient questions answered. Pt voiced understanding. 5.  Reviewed prior labs and health maintenance  6. Continue current medications, diet and exercise. Completed Refills   Requested Prescriptions      No prescriptions requested or ordered in this encounter         Return in about 6 months (around 4/14/2023) for Check up.

## 2022-10-14 NOTE — PATIENT INSTRUCTIONS
SURVEY:     You may be receiving a survey from [employer] regarding your visit today. Please complete the survey to enable us to provide the highest quality of care to you and your family. If you cannot score us a very good on any question, please call the office to discuss how we could have made your experience a very good one.      Thank you,    Silverio Teague, APRN-KATE Vasques, APRN-CNP  DI Dooley, MARK Avery, MARK Hitchcock, CMA  Lore, JOSH Burroughs, PM

## 2022-10-19 ENCOUNTER — HOSPITAL ENCOUNTER (OUTPATIENT)
Dept: PHYSICAL THERAPY | Age: 47
Setting detail: THERAPIES SERIES
Discharge: HOME | End: 2022-10-19
Payer: MEDICARE

## 2022-10-19 PROCEDURE — 97110 THERAPEUTIC EXERCISES: CPT

## 2022-10-19 NOTE — PROGRESS NOTES
Phone: John           Fax: [phone]                           Outpatient Physical Therapy                                                                            Daily Note    Patient: Dominick Finn : 1975  [facility] #: [id number]   Referring Physician: Brittany Cox MD    Date: 10/19/2022       Treatment Diagnosis: R Hip Pain, Hip OA    Onset Date: 08/15/22  PT Insurance Information: Medicare  Total # of Visits Approved: 24 Per Physician Order  Total # of Visits to Date: 17  No Show: 0  Canceled Appointment: 0      Pre-Treatment Pain:  0/10  Subjective: Pt doing well this date, continues to do well with progressed therex in clinic. No pain to note at this time. Exercises:  Exercise 1: HEP: LTR, MET, Posterior chain stretching  Exercise 2: Scifit 8min Level 4  Exercise 3: Hip rotation stretching IR/ER on stool 15x ea  Exercise 5: SLS trampoline toss x10 B 3 way  Exercise 7: Hip flexor stretch at step 3x15\"  Exercise 8: Lateral band/monster walks, Rotations this date 2x CCW, 2x CW  Exercise 15: Leg Press DL/SL 9-14 Plates 2G02 ea  Exercise 16: RDL SL 10x B      Assessment  Body Structures, Functions, Activity Limitations Requiring Skilled Therapeutic Intervention: Decreased ADL status, Decreased ROM, Decreased body mechanics, Decreased strength, Decreased tolerance to work activity, Decreased endurance, Decreased high-level IADLs, Increased pain  Assessment: Pt continues to do well with progressed LE strengthening exercises, including heavy weight with the leg press. pt is also demonstrating dynamics improvements with functional mobiity as seen with SL RDL exercise. Will continue to progress pt as tolerated. Activity Tolerance  Activity Tolerance: Patient tolerated treatment well    Patient Education  Patient Education: HEP  Pt verbalized/demonstrated good understanding:     [x] Yes         [] No, pt required further clarification.        Post Treatment Pain:  0/10      Plan  Plan Frequency: 2x/week  Plan weeks: 6 weeks       Goals  (Total # of Visits to Date: 16)      Short Term Goals  Time Frame for Short Term Goals: 3 weeks  Short Term Goal 1: Pt to initate HEP -MET  Short Term Goal 2: Pt to not exceed 7/10 pain to improve functional capacity for ADLs-met    Long Term Goals  Time Frame for Long Term Goals : 6 weeks  Long Term Goal 1: Pt to not exceed 4/10 pain to improve functional capacity for ADLs - progressing (6/10)  Long Term Goal 2: Pt to be comfortable and complient with HEP - met  Long Term Goal 3: Pt to demonstrate >/= 100 degrees Hip Flexion B to improve functional mobility and decrease pressure on posterior structures. -progressing (L: 96*, R: 85*)  Long Term Goal 4: Pt to improve R hip flexion MMT to >/= 4/5 to improve safety of gait and other wb ADLs.  MET (4+/5 - 5/5 RLE MMT)    Minutes Tracking:  Time In: 1266  Time Out: [address]  Minutes: 46  Timed Code Treatment Minutes: 20 [facility], PT     Date: 10/19/2022

## 2022-10-21 ENCOUNTER — HOSPITAL ENCOUNTER (OUTPATIENT)
Dept: PHYSICAL THERAPY | Age: 47
Setting detail: THERAPIES SERIES
Discharge: HOME | End: 2022-10-21
Payer: MEDICARE

## 2022-10-21 PROCEDURE — 97110 THERAPEUTIC EXERCISES: CPT

## 2022-10-21 NOTE — PROGRESS NOTES
Phone: John           Fax: [phone]                           Outpatient Physical Therapy                                                                            Daily Note    Patient: Andreea Luther : 1975  CSN #: [id number]   Referring Physician: Robyn Herron MD    Date: 10/21/2022       Treatment Diagnosis: R Hip Pain, Hip OA    Onset Date: 08/15/22  PT Insurance Information: Medicare  Total # of Visits Approved: 24 Per Physician Order  Total # of Visits to Date: 25  No Show: 0  Canceled Appointment: 0      Pre-Treatment Pain:  0/10  Subjective: Pt states he woke up a little stiff, but has been doing a lot more in terms of activity recently. No pain to note at this time. Exercises:  Exercise 1: HEP: LTR, MET, Posterior chain stretching  Exercise 2: Scifit 6min Level 6  Exercise 5: SLS trampoline toss x10 B 3 way  Exercise 7: Hip flexor stretch at step 3x15\"  Exercise 15: Leg Press DL/SL 9-15 Plates 3E17 ea  Exercise 16: RDL SL 10x B 15#    Assessment  Body Structures, Functions, Activity Limitations Requiring Skilled Therapeutic Intervention: Decreased ADL status, Decreased ROM, Decreased body mechanics, Decreased strength, Decreased tolerance to work activity, Decreased endurance, Decreased high-level IADLs, Increased pain  Assessment: Pt did well this date with added resistance to RDL exercise and demonstrates good balance with SLS trampoline tosses. Pt continues to not have any increase in pain or discomfort with progressed strengthening and balance therex. Plan to progress pt as tolerated. Activity Tolerance  Activity Tolerance: Patient tolerated treatment well    Patient Education  Patient Education: HEP  Pt verbalized/demonstrated good understanding:     [x] Yes         [] No, pt required further clarification.        Post Treatment Pain:  0/10      Plan  Plan Frequency: 2x/week  Plan weeks: 6 weeks       Goals  (Total # of Visits to Date:

## 2022-10-24 ENCOUNTER — HOSPITAL ENCOUNTER (OUTPATIENT)
Dept: PHYSICAL THERAPY | Age: 47
Setting detail: THERAPIES SERIES
Discharge: HOME | End: 2022-10-24
Payer: MEDICARE

## 2022-10-24 PROCEDURE — 97110 THERAPEUTIC EXERCISES: CPT

## 2022-10-25 NOTE — PROGRESS NOTES
Phone: John           Fax: [phone]                           Outpatient Physical Therapy                                                                            Daily Note    Patient: Lashay Sullivan : 1975  CSN #: [id number]   Referring Physician: Destiny Sanchez MD    Date: 10/24/2022     Treatment Diagnosis: R Hip Pain, Hip OA    Onset Date: 08/15/22  PT Insurance Information: Medicare  Total # of Visits Approved: 24 Per Physician Order  Total # of Visits to Date: 23  No Show: 0  Canceled Appointment: 0    Pre-Treatment Pain:  0/10  Subjective: Pt reports he is making improvements but still pretty stiff at times. Pt denies pain at rest.    Exercises:  Exercise 2: Scifit 6min Level 6  Exercise 3: Hip rotation stretching IR/ER on stool 15x ea  Exercise 7: Hip flexor stretch at step 3x15\"  Exercise 8: Lateral band/monster walks, Rotations this date 2x CCW, 2x CW  Exercise 11: FSU/ LSU 10x ea 8 inch  Exercise 12: sit<>stands 15x  Exercise 13: Ball against wall squat x10  Exercise 17: Yuan retro pulls 5x 13.5  Exercise 18: GTB 4 way hip  15 x ea    Assessment  Assessment: Pt conitnues to display Blaze hip ROM limitations but L>R. Strength progressing but complesation remains. Activity Tolerance  Activity Tolerance: Patient tolerated treatment well    Patient Education  Patient Education: HEP  Pt verbalized/demonstrated good understanding:     [x] Yes         [] No, pt required further clarification.      Post Treatment Pain:  0/10    Plan  Plan Frequency: 2x/week  Plan weeks: 6 weeks     Goals  (Total # of Visits to Date: 23)      Short Term Goals  Time Frame for Short Term Goals: 3 weeks  Short Term Goal 1: Pt to initate HEP -MET  Short Term Goal 2: Pt to not exceed 7/10 pain to improve functional capacity for ADLs-met    Long Term Goals  Time Frame for Long Term Goals : 6 weeks  Long Term Goal 1: Pt to not exceed 4/10 pain to improve functional capacity for ADLs - progressing (6/10)  Long Term Goal 2: Pt to be comfortable and complient with HEP - met  Long Term Goal 3: Pt to demonstrate >/= 100 degrees Hip Flexion B to improve functional mobility and decrease pressure on posterior structures. -progressing (L: 96*, R: 85*)  Long Term Goal 4: Pt to improve R hip flexion MMT to >/= 4/5 to improve safety of gait and other wb ADLs.  MET (4+/5 - 5/5 RLE MMT)    Minutes Tracking:  Time In: 1206  Time Out: 6146  Minutes: 44  Timed Code Treatment Minutes: Dipika Milan     Date: 10/24/2022

## 2022-10-26 ENCOUNTER — HOSPITAL ENCOUNTER (OUTPATIENT)
Dept: PHYSICAL THERAPY | Age: 47
Setting detail: THERAPIES SERIES
Discharge: HOME | End: 2022-10-26
Payer: MEDICARE

## 2022-10-26 PROCEDURE — 97110 THERAPEUTIC EXERCISES: CPT

## 2022-10-27 NOTE — PROGRESS NOTES
Phone: Jonh           Fax: [phone]                           Outpatient Physical Therapy                                                                            Daily Note    Patient: Dinesh Lee : 1975  CSN #: [id number]   Referring Physician: Jason Valle MD    Date: 10/26/2022     Treatment Diagnosis: R Hip Pain, Hip OA    Onset Date: 08/15/22  PT Insurance Information: Medicare  Total # of Visits Approved: 24 Per Physician Order  Total # of Visits to Date: 20  No Show: 0  Canceled Appointment: 0    Pre-Treatment Pain:  0/10  Subjective: Pt reports hes still pretty tight but feeling a little better for strength. Pt denies current pain. Exercises:  Exercise 1: HEP: LTR, MET, Posterior chain stretching  Exercise 2: Scifit 8min Level 6  Exercise 3: Hip rotation stretching IR/ER on stool 15x ea  Exercise 7: Hip flexor stretch at step 3x15\"  Exercise 8: Lateral band/monster walks, Rotations this date 2x CCW, 2x CW  Exercise 11: FSU/ LSU 10x ea 8 inch  Exercise 12: sit<>stands 15x  Exercise 13: Ball against wall squat x10  Exercise 14: Startrac HS curls 6pl DL 2x15  Exercise 17: Yuan retro pulls 5x 13.5  Exercise 18: GTB 4 way hip  15 x ea    Assessment  Assessment: Progressed to 8 inch step ups today with fair tolerance noted. COntinued working on hip mobility program with no increased pain noted. Will continue to progress as Pt tolerates. Activity Tolerance  Activity Tolerance: Patient tolerated treatment well    Patient Education  Patient Education: HEP  Pt verbalized/demonstrated good understanding:     [x] Yes         [] No, pt required further clarification.      Post Treatment Pain:  0/10    Plan  Plan Frequency: 2x/week  Plan weeks: 6 weeks     Goals  (Total # of Visits to Date: 21)      Short Term Goals  Time Frame for Short Term Goals: 3 weeks  Short Term Goal 1: Pt to initate HEP -MET  Short Term Goal 2: Pt to not exceed 7/10 pain to improve functional capacity for ADLs-met    Long Term Goals  Time Frame for Long Term Goals : 6 weeks  Long Term Goal 1: Pt to not exceed 4/10 pain to improve functional capacity for ADLs - progressing (6/10)  Long Term Goal 2: Pt to be comfortable and complient with HEP - met  Long Term Goal 3: Pt to demonstrate >/= 100 degrees Hip Flexion B to improve functional mobility and decrease pressure on posterior structures. -progressing (L: 96*, R: 85*)  Long Term Goal 4: Pt to improve R hip flexion MMT to >/= 4/5 to improve safety of gait and other wb ADLs.  MET (4+/5 - 5/5 RLE MMT)    Minutes Tracking:  Time In: 1347  Time Out: 1430  Minutes: 43  Timed Code Treatment Minutes: [address], [place]     Date: 10/26/2022

## 2022-10-31 ENCOUNTER — HOSPITAL ENCOUNTER (OUTPATIENT)
Dept: PHYSICAL THERAPY | Age: 47
Setting detail: THERAPIES SERIES
Discharge: HOME | End: 2022-10-31
Payer: MEDICARE

## 2022-10-31 PROCEDURE — 97110 THERAPEUTIC EXERCISES: CPT

## 2022-10-31 NOTE — PROGRESS NOTES
Phone: John           Fax: [phone]                           Outpatient Physical Therapy                                                                            Daily Note    Patient: Zeny Lewis : 1975  CSN #: [id number]   Referring Physician: Ki Shaffer MD    Date: 10/31/2022     Treatment Diagnosis: R Hip Pain, Hip OA    Onset Date: 08/15/22  PT Insurance Information: Medicare  Total # of Visits Approved: 24 Per Physician Order  Total # of Visits to Date: 21  No Show: 0  Canceled Appointment: 0    Pre-Treatment Pain:  0/10  Subjective: Pt reports his hips are still pretty tight but he thinks hes getting a little better. Pt denies current pain. Exercises:  Exercise 2: Scifit 8min Level 6  Exercise 3: Hip rotation stretching IR/ER on stool 15x ea  Exercise 4: Hip Hikes 10x B 6\" hurddle  Exercise 6: LTR/DKTC on ball x15 ea  Exercise 7: Hip flexor stretch at step 3x15\"  Exercise 8: Lateral band/monster walks, Rotations this date 2x CCW, 2x CW  Exercise 10: walking lunges  Exercise 11: FSU/ LSU 10x ea 8 inch  Exercise 12: sit<>stands 15x 10#  Exercise 16: RDL SL 10x B 15#  Exercise 17: Yuan retro pulls 5x 13.5    Assessment  Assessment: Pt displays balance deficits with forward lunges with inablity to complete full lunge motion. Strength progressing. Activity Tolerance  Activity Tolerance: Patient tolerated treatment well    Patient Education  Patient Education: HEP  Pt verbalized/demonstrated good understanding:     [x] Yes         [] No, pt required further clarification.      Post Treatment Pain:  0/10    Plan  Plan Frequency: 2x/week  Plan weeks: 6 weeks     Goals  (Total # of Visits to Date: 24)      Short Term Goals  Time Frame for Short Term Goals: 3 weeks  Short Term Goal 1: Pt to initate HEP -MET  Short Term Goal 2: Pt to not exceed 7/10 pain to improve functional capacity for ADLs-met    Long Term Goals  Time Frame for Long Term Goals : 6 weeks  Long Term Goal 1: Pt to not exceed 4/10 pain to improve functional capacity for ADLs - progressing (6/10)  Long Term Goal 2: Pt to be comfortable and complient with HEP - met  Long Term Goal 3: Pt to demonstrate >/= 100 degrees Hip Flexion B to improve functional mobility and decrease pressure on posterior structures. -progressing (L: 96*, R: 85*)  Long Term Goal 4: Pt to improve R hip flexion MMT to >/= 4/5 to improve safety of gait and other wb ADLs.  MET (4+/5 - 5/5 RLE MMT)    Minutes Tracking:  Time In: 8208  Time Out: [address]  Minutes: 47  Timed Code Treatment Minutes: [place], [place]     Date: 10/31/2022

## 2022-11-03 ENCOUNTER — HOSPITAL ENCOUNTER (OUTPATIENT)
Dept: PHYSICAL THERAPY | Age: 47
Setting detail: THERAPIES SERIES
Discharge: HOME | End: 2022-11-03
Payer: MEDICARE

## 2022-11-03 PROCEDURE — 97140 MANUAL THERAPY 1/> REGIONS: CPT

## 2022-11-03 PROCEDURE — 97110 THERAPEUTIC EXERCISES: CPT

## 2022-11-07 ENCOUNTER — HOSPITAL ENCOUNTER (OUTPATIENT)
Dept: PHYSICAL THERAPY | Age: 47
Setting detail: THERAPIES SERIES
Discharge: HOME | End: 2022-11-07
Payer: MEDICARE

## 2022-11-07 PROCEDURE — 97140 MANUAL THERAPY 1/> REGIONS: CPT

## 2022-11-07 PROCEDURE — 97110 THERAPEUTIC EXERCISES: CPT

## 2022-11-07 NOTE — PROGRESS NOTES
Phone: John           Fax: [phone]                           Outpatient Physical Therapy                                                                            Daily Note    Patient: Nisha Gonzalez : 1975  [facility] #: [id number]   Referring Physician: Cesilia Springer MD    Date: 2022       Treatment Diagnosis: R Hip Pain, Hip OA    Onset Date: 08/15/22  PT Insurance Information: Medicare  Total # of Visits Approved: 24 Per Physician Order  Total # of Visits to Date: 23  No Show: 0  Canceled Appointment: 0      Pre-Treatment Pain:  0/10  Subjective: No pain currently, just some tightness. Exercises:  Exercise 1: HEP: LTR, MET, Posterior chain stretching  Exercise 2: Scifit 8min Level 6  Exercise 3: Hip rotation stretching IR/ER on stool 15x ea  Exercise 6: LTR/DKTC on ball x15 ea  Exercise 7: Hip flexor stretch at step 3x15\"  Exercise 11: FSU/ LSU 10x ea 8 inch  Exercise 12: sit<>stands 15x 10#  Exercise 17: [place] retro pulls 5x 13.5    Manual:  Manual Traction: gentle hip ROM, B LE long axis distraction  Other: Blaze hip IR, ER, flex -- prone with IR/ER today    Assessment  Assessment: Completed manual hip IR/ER rotation with pt prone today with fair tolerance and noted increase hip IR ROM Bilat. He was able to get past neutral after prone stretch. No pain with tx.  WIll continue as tolerated. Activity Tolerance  Activity Tolerance: Patient tolerated treatment well    Patient Education  Ex technique, prone stretch at home  Pt verbalized/demonstrated good understanding:     [x] Yes         [] No, pt required further clarification.        Post Treatment Pain: 0 /10      Plan  Plan Frequency: 2x/week  Plan weeks: 6 weeks       Goals  (Total # of Visits to Date: 21)      Short Term Goals  Time Frame for Short Term Goals: 3 weeks  Short Term Goal 1: Pt to initate HEP -MET  Short Term Goal 2: Pt to not exceed 7/10 pain to improve functional capacity for ADLs-met    Long Term Goals  Time Frame for Long Term Goals : 6 weeks  Long Term Goal 1: Pt to not exceed 4/10 pain to improve functional capacity for ADLs - progressing (6/10)  Long Term Goal 2: Pt to be comfortable and complient with HEP - met  Long Term Goal 3: Pt to demonstrate >/= 100 degrees Hip Flexion B to improve functional mobility and decrease pressure on posterior structures. -progressing (L: 96*, R: 85*)  Long Term Goal 4: Pt to improve R hip flexion MMT to >/= 4/5 to improve safety of gait and other wb ADLs.  MET (4+/5 - 5/5 RLE MMT)    Minutes Tracking:  Time In: 1471  Time Out: 26  Minutes: Prateek Vanegas PTA     Date: 11/7/2022

## 2022-11-10 ENCOUNTER — HOSPITAL ENCOUNTER (OUTPATIENT)
Dept: PHYSICAL THERAPY | Age: 47
Setting detail: THERAPIES SERIES
Discharge: HOME | End: 2022-11-10
Payer: MEDICARE

## 2022-11-10 PROCEDURE — 97110 THERAPEUTIC EXERCISES: CPT

## 2022-11-10 PROCEDURE — 97140 MANUAL THERAPY 1/> REGIONS: CPT

## 2022-11-11 NOTE — PROGRESS NOTES
Phone: John           Fax: [phone]                           Outpatient Physical Therapy                                                                            Daily Note    Patient: Mendel Mackintosh : 1975  CSN #: [id number]   Referring Physician: So Cardenas MD    Date: 11/10/2022     Treatment Diagnosis: R Hip Pain, Hip OA    Onset Date: 08/15/22  PT Insurance Information: Medicare  Total # of Visits Approved: 24 Per Physician Order  Total # of Visits to Date: 24  No Show: 0  Canceled Appointment: 0    Pre-Treatment Pain:  0/10  Subjective: Pt denies current pain. Pt states he is still just pretty tight. Exercises:  Exercise 2: Scifit 8min Level 6  Exercise 3: Hip rotation stretching IR/ER on stool 15x ea  Exercise 6: LTR/DKTC on ball x15 ea  Exercise 7: Hip flexor stretch at step 3x15\"  Exercise 8: Lateral band/monster walks, Rotations this date 2x CCW, 2x CW  Exercise 11: FSU/ LSU 10x ea 8 inch  Exercise 18: GTB 4 way hip  15 x ea    Manual:  Manual Traction: gentle hip ROM, B LE long axis distraction  Other: Blaze hip IR, ER, flex -- prone with IR/ER today    Assessment  Assessment: R hip ROM remains greater limited than L LE. Pt continues to amb with circumduction on RLE during swing through phase. Activity Tolerance  Activity Tolerance: Patient tolerated treatment well    Patient Education  Patient Education: HEP  Pt verbalized/demonstrated good understanding:     [x] Yes         [] No, pt required further clarification.      Post Treatment Pain:  0/10    Plan  Plan Frequency: 2x/week  Plan weeks: 6 weeks     Goals  (Total # of Visits to Date: 25)      Short Term Goals  Time Frame for Short Term Goals: 3 weeks  Short Term Goal 1: Pt to initate HEP -MET  Short Term Goal 2: Pt to not exceed 7/10 pain to improve functional capacity for ADLs-met    Long Term Goals  Time Frame for Long Term Goals : 6 weeks  Long Term Goal 1: Pt to not exceed 4/10 pain to improve functional capacity for ADLs - progressing (6/10)  Long Term Goal 2: Pt to be comfortable and complient with HEP - met  Long Term Goal 3: Pt to demonstrate >/= 100 degrees Hip Flexion B to improve functional mobility and decrease pressure on posterior structures. -progressing (L: 96*, R: 85*)  Long Term Goal 4: Pt to improve R hip flexion MMT to >/= 4/5 to improve safety of gait and other wb ADLs.  MET (4+/5 - 5/5 RLE MMT)    Minutes Tracking:  Time In: 1401  Time Out: 3439  Minutes: 41  Timed Code Treatment Minutes: [address] [place], [place]     Date: 11/10/2022

## 2022-11-14 ENCOUNTER — HOSPITAL ENCOUNTER (OUTPATIENT)
Dept: PHYSICAL THERAPY | Age: 47
Setting detail: THERAPIES SERIES
Discharge: HOME | End: 2022-11-14
Payer: MEDICARE

## 2022-11-14 PROCEDURE — 97110 THERAPEUTIC EXERCISES: CPT

## 2022-11-14 NOTE — PROGRESS NOTES
Phone: John           Fax: [phone]                           Outpatient Physical Therapy                                                                            Daily Note    Patient: Cande Rees : 1975  CSN #: [id number]   Referring Physician: Stefano Kelly MD    Date: 2022    Diagnosis: M16.11, M16.12, Primary OA R, L  Treatment Diagnosis: R Hip Pain, Hip OA    Onset Date: 08/15/22  PT Insurance Information: Medicare  Total # of Visits Approved: 39 Per Physician Order  Total # of Visits to Date: 25  No Show: 0  Canceled Appointment: 0      Pre-Treatment Pain:  0/10  Subjective: Pt states he feels like he has made good improvements, but is still getting occasional thigh pain and is sometimes having difficulty initiating some hip movements. Notes no pain at this time, pain at worst 7/10. Exercises:  Exercise 1: HEP: LTR, MET, Posterior chain stretching, Added MET  Exercise 7: Hip flexor stretch at step 3x15\"  Exercise 10: Dynamic hip rotation warm up 2 laps  Exercise 13: Ball against wall squat x10  Exercise 15: Leg Press DL/SL 9-15 Plates 8X14 ea  Exercise 16: Hip stretch at bar/deep squat x10      Assessment  Body Structures, Functions, Activity Limitations Requiring Skilled Therapeutic Intervention: Decreased ADL status, Decreased ROM, Decreased body mechanics, Decreased strength, Decreased tolerance to work activity, Decreased endurance, Decreased high-level IADLs, Increased pain  Assessment: Pt demonstrates with greatly improved Hip strength and stability compared to IE. Pt still demonstrates mild hip rotation strength limitation for R hip. Pt demonstrates 105 degrees AAROM L Hip flexion, 95 AAROM for R. Pt demonstrates signs and symptoms of R sided SI dysfunction at this time that was relieved with MET in clinic today.  Plan to transition towards HEP, plan to hold 2 weeks to determine efficacy of HEP and ensure no further issues arise.    Activity Tolerance  Activity Tolerance: Patient tolerated treatment well    Patient Education  Patient Education: HEP  Pt verbalized/demonstrated good understanding:     [x] Yes         [] No, pt required further clarification. Post Treatment Pain:  0/10      Plan  Plan Frequency: 2x/week  Plan weeks: 6 weeks       Goals  (Total # of Visits to Date: 22)      Short Term Goals  Time Frame for Short Term Goals: 3 weeks  Short Term Goal 1: Pt to initate HEP -MET  Short Term Goal 2: Pt to not exceed 7/10 pain to improve functional capacity for ADLs-met    Long Term Goals  Time Frame for Long Term Goals : 6 weeks  Long Term Goal 1: Pt to not exceed 4/10 pain to improve functional capacity for ADLs - progressing (7/10)  Long Term Goal 2: Pt to be comfortable and complient with HEP - met  Long Term Goal 3: Pt to demonstrate >/= 100 degrees Hip Flexion B to improve functional mobility and decrease pressure on posterior structures. -progressing (L: 105*, R: 95*)  Long Term Goal 4: Pt to improve R hip flexion MMT to >/= 4/5 to improve safety of gait and other wb ADLs.  MET (4+/5 - 5/5 RLE MMT)    Minutes Tracking:  Time In: 6674  Time Out: [place]  Minutes: 43  Timed Code Treatment Minutes: 1006 Bora Webb PT     Date: 11/14/2022

## 2022-11-14 NOTE — PLAN OF CARE
formerly [facility]           Phone: [phone]             Outpatient Physical Therapy  Fax: [phone]                                           Date: 2022  Patient: Citlali Liao : 1975 CSN #: [id number]   Referring Physician: Anne Cunningham MD      [] Plan of Care   [x] Updated Plan of Care    Dates of Service to Include: 2022 to 22    Diagnosis:  M16.11, M16.12, Primary OA R, L    Rehab (Treatment) Diagnosis:  R Hip Pain, Hip OA             Onset Date:  08/15/22    Attendance  Total # of Visits to Date: 25 No Show: 0 Canceled Appointment: 0    Assessment  Body Structures, Functions, Activity Limitations Requiring Skilled Therapeutic Intervention: Decreased ADL status, Decreased ROM, Decreased body mechanics, Decreased strength, Decreased tolerance to work activity, Decreased endurance, Decreased high-level IADLs, Increased pain  Assessment: Pt demonstrates with greatly improved Hip strength and stability compared to IE. Pt still demonstrates mild hip rotation strength limitation for R hip. Pt demonstrates 105 degrees AAROM L Hip flexion, 95 AAROM for R. Pt demonstrates signs and symptoms of R sided SI dysfunction at this time that was relieved with MET in clinic today. Plan to transition towards HEP, plan to hold 2 weeks to determine efficacy of HEP and ensure no further issues arise.       Goals  Short Term Goals  Time Frame for Short Term Goals: 3 weeks  Short Term Goal 1: Pt to initate HEP -MET  Short Term Goal 2: Pt to not exceed 7/10 pain to improve functional capacity for ADLs-met  Long Term Goals  Time Frame for Long Term Goals : 6 weeks  Long Term Goal 1: Pt to not exceed 4/10 pain to improve functional capacity for ADLs - progressing (7/10)  Long Term Goal 2: Pt to be comfortable and complient with HEP - met  Long Term Goal 3: Pt to demonstrate >/= 100 degrees Hip Flexion B to improve functional mobility and decrease pressure on posterior structures. -progressing (L: 105*, R: 95*)  Long Term Goal 4: Pt to improve R hip flexion MMT to >/= 4/5 to improve safety of gait and other wb ADLs.  MET (4+/5 - 5/5 RLE MMT)     Prognosis  Therapy Prognosis: Good    Treatment Plan   Plan Frequency: 2x/week  Plan weeks: 6 weeks  [x] HP/CP      [x] Electrical Stim   [x] Therapeutic Exercise      [x] Gait Training  [] Aquatics   [] Ultrasound         [x] Patient Education/HEP   [x] Manual Therapy  [] Traction    [x] Neuro-nabil        [x] Soft Tissue Mobs            [] Home TENS  [] Iontophoresis    [] Orthotic casting/fitting      [x] Dry Needling             Electronically signed by: Mily Gibbs PT, DPT    Date: 11/14/2022      ______________________________________ Date: 11/14/2022   Physician Signature

## 2023-01-05 NOTE — DISCHARGE SUMMARY
Phone: John          Fax: [phone]                            Outpatient Physical Therapy                                                                    Discharge Summary    Patient: Geovanny Sanders  : 1975  [facility] #: [id number]   Referring Physician: Linda Guerra MD   Diagnosis: M16.11, M16.12, Primary OA R, L  Treatment Diagnosis: R Hip Pain, Hip OA      Date Treatment Initiated: 22  Date of Last Treatment: 22      PT Visit Information  Onset Date: 08/15/22  PT Insurance Information: Medicare  Total # of Visits Approved: 36  Total # of Visits to Date: 25  Plan of Care/Certification Expiration Date: 22  No Show: 0  Canceled Appointment: 0  Referring Provider (secondary): Franc      Frequency/Duration  Plan Frequency: 2x/week times per week  Plan weeks: 6 weeks weeks      Treatment Received  [x] HP/CP      [x] Electrical Stim   [x] Therapeutic Exercise      [x] Gait Training  [] Aquatics   [] Ultrasound          [x]Patient Education/HEP   [x] Manual Therapy  [] Traction    [x] Neuro-nabil        [x] Soft Tissue Mobs            [] Home TENS  [] Iontophoresis    [] Orthotic casting/fitting      [x] Dry Needling    Assessment  Assessment: Pt demonstrates with greatly improved Hip strength and stability compared to IE. Pt still demonstrates mild hip rotation strength limitation for R hip. Pt demonstrates 105 degrees AAROM L Hip flexion, 95 AAROM for R. Pt demonstrates signs and symptoms of R sided SI dysfunction at this time that was relieved with MET in clinic today. Pt did well with 2 weeks of HEP, plan to d/c to individualized HEP at this time.        Goals  Short Term Goals  Time Frame for Short Term Goals: 3 weeks  Short Term Goal 1: Pt to initate HEP -MET  Short Term Goal 2: Pt to not exceed 7/10 pain to improve functional capacity for ADLs-met    Long Term Goals  Time Frame for Long Term Goals : 6 weeks  Long Term Goal 1: Pt to not exceed 4/10 pain to improve functional capacity for ADLs - progressing (7/10)  Long Term Goal 2: Pt to be comfortable and complient with HEP - met  Long Term Goal 3: Pt to demonstrate >/= 100 degrees Hip Flexion B to improve functional mobility and decrease pressure on posterior structures. -progressing (L: 105*, R: 95*)  Long Term Goal 4: Pt to improve R hip flexion MMT to >/= 4/5 to improve safety of gait and other wb ADLs.  MET (4+/5 - 5/5 RLE MMT)      Reason for Discharge  [x] Goals Achieved                        []  Poor Follow Through/Attendance                  []  Optimal Function Achieved     []  Patient Discharged Self    []  Hospitalization                         []  Physician discharge      Thank you for this referral      Candelario Todd, PT, DPT               Date: 1/5/2023

## 2023-01-27 ENCOUNTER — TELEPHONE (OUTPATIENT)
Dept: PRIMARY CARE CLINIC | Age: 48
End: 2023-01-27

## 2023-03-05 DIAGNOSIS — E78.5 DYSLIPIDEMIA: ICD-10-CM

## 2023-03-05 DIAGNOSIS — E11.42 CONTROLLED TYPE 2 DIABETES MELLITUS WITH DIABETIC POLYNEUROPATHY, WITHOUT LONG-TERM CURRENT USE OF INSULIN (HCC): ICD-10-CM

## 2023-03-05 RX ORDER — SITAGLIPTIN AND METFORMIN HYDROCHLORIDE 1000; 50 MG/1; MG/1
TABLET, FILM COATED ORAL
Qty: 180 TABLET | Refills: 3 | Status: SHIPPED | OUTPATIENT
Start: 2023-03-05

## 2023-03-05 RX ORDER — ATORVASTATIN CALCIUM 40 MG/1
TABLET, FILM COATED ORAL
Qty: 90 TABLET | Refills: 3 | Status: SHIPPED | OUTPATIENT
Start: 2023-03-05

## 2023-03-05 RX ORDER — LOSARTAN POTASSIUM 100 MG/1
TABLET ORAL
Qty: 90 TABLET | Refills: 3 | Status: SHIPPED | OUTPATIENT
Start: 2023-03-05

## 2023-05-04 ENCOUNTER — TELEPHONE (OUTPATIENT)
Dept: PRIMARY CARE CLINIC | Age: 48
End: 2023-05-04

## 2023-05-10 ENCOUNTER — HOSPITAL ENCOUNTER (OUTPATIENT)
Age: 48
Discharge: HOME | End: 2023-05-10
Payer: MEDICARE

## 2023-05-10 DIAGNOSIS — E11.42 CONTROLLED TYPE 2 DIABETES MELLITUS WITH DIABETIC POLYNEUROPATHY, WITHOUT LONG-TERM CURRENT USE OF INSULIN (HCC): ICD-10-CM

## 2023-05-10 LAB
ABSOLUTE EOS #: 0.08 K/UL (ref 0–0.44)
ABSOLUTE IMMATURE GRANULOCYTE: <0.03 K/UL (ref 0–0.3)
ABSOLUTE LYMPH #: 1.52 K/UL (ref 1.1–3.7)
ABSOLUTE MONO #: 0.8 K/UL (ref 0.1–1.2)
ALT SERPL-CCNC: 18 U/L (ref 5–41)
ANION GAP SERPL CALCULATED.3IONS-SCNC: 10 MMOL/L (ref 9–17)
AST SERPL-CCNC: 24 U/L
BASOPHILS # BLD: 1 % (ref 0–2)
BASOPHILS ABSOLUTE: 0.06 K/UL (ref 0–0.2)
BUN SERPL-MCNC: 24 MG/DL (ref 6–20)
BUN/CREAT BLD: 26 (ref 9–20)
CALCIUM SERPL-MCNC: 9.6 MG/DL (ref 8.6–10.4)
CHLORIDE SERPL-SCNC: 101 MMOL/L (ref 98–107)
CHOLEST SERPL-MCNC: 164 MG/DL
CHOLESTEROL/HDL RATIO: 3.2
CO2 SERPL-SCNC: 23 MMOL/L (ref 20–31)
CREAT SERPL-MCNC: 0.94 MG/DL (ref 0.7–1.2)
CREATININE URINE: 29.4 MG/DL (ref 39–259)
EOSINOPHILS RELATIVE PERCENT: 2 % (ref 1–4)
GFR SERPL CREATININE-BSD FRML MDRD: >60 ML/MIN/1.73M2
GLUCOSE SERPL-MCNC: 93 MG/DL (ref 70–99)
HCT VFR BLD AUTO: 39.7 % (ref 40.7–50.3)
HDLC SERPL-MCNC: 51 MG/DL
HGB BLD-MCNC: 13.2 G/DL (ref 13–17)
IMMATURE GRANULOCYTES: 0 %
LDLC SERPL CALC-MCNC: 93 MG/DL (ref 0–130)
LYMPHOCYTES # BLD: 31 % (ref 24–43)
MCH RBC QN AUTO: 29.9 PG (ref 25.2–33.5)
MCHC RBC AUTO-ENTMCNC: 33.2 G/DL (ref 28.4–34.8)
MCV RBC AUTO: 90 FL (ref 82.6–102.9)
MICROALBUMIN/CREAT 24H UR: <12 MG/L
MICROALBUMIN/CREAT UR-RTO: ABNORMAL MCG/MG CREAT
MONOCYTES # BLD: 17 % (ref 3–12)
NRBC AUTOMATED: 0 PER 100 WBC
PDW BLD-RTO: 15.3 % (ref 11.8–14.4)
PLATELET # BLD AUTO: 250 K/UL (ref 138–453)
PMV BLD AUTO: 9.8 FL (ref 8.1–13.5)
POTASSIUM SERPL-SCNC: 4.5 MMOL/L (ref 3.7–5.3)
RBC # BLD: 4.41 M/UL (ref 4.21–5.77)
SEG NEUTROPHILS: 49 % (ref 36–65)
SEGMENTED NEUTROPHILS ABSOLUTE COUNT: 2.38 K/UL (ref 1.5–8.1)
SODIUM SERPL-SCNC: 134 MMOL/L (ref 135–144)
TRIGL SERPL-MCNC: 99 MG/DL
WBC # BLD AUTO: 4.9 K/UL (ref 3.5–11.3)

## 2023-05-10 PROCEDURE — 84460 ALANINE AMINO (ALT) (SGPT): CPT

## 2023-05-10 PROCEDURE — 83036 HEMOGLOBIN GLYCOSYLATED A1C: CPT

## 2023-05-10 PROCEDURE — 80048 BASIC METABOLIC PNL TOTAL CA: CPT

## 2023-05-10 PROCEDURE — 82043 UR ALBUMIN QUANTITATIVE: CPT

## 2023-05-10 PROCEDURE — 85025 COMPLETE CBC W/AUTO DIFF WBC: CPT

## 2023-05-10 PROCEDURE — 36415 COLL VENOUS BLD VENIPUNCTURE: CPT

## 2023-05-10 PROCEDURE — 80061 LIPID PANEL: CPT

## 2023-05-10 PROCEDURE — 82570 ASSAY OF URINE CREATININE: CPT

## 2023-05-10 PROCEDURE — 84450 TRANSFERASE (AST) (SGOT): CPT

## 2023-05-11 LAB
EST. AVERAGE GLUCOSE BLD GHB EST-MCNC: 105 MG/DL
HBA1C MFR BLD: 5.3 % (ref 4–6)

## 2023-05-12 ENCOUNTER — OFFICE VISIT (OUTPATIENT)
Dept: PRIMARY CARE CLINIC | Age: 48
End: 2023-05-12

## 2023-05-12 VITALS
HEIGHT: 67 IN | WEIGHT: 212.6 LBS | SYSTOLIC BLOOD PRESSURE: 126 MMHG | HEART RATE: 91 BPM | TEMPERATURE: 97.1 F | OXYGEN SATURATION: 98 % | DIASTOLIC BLOOD PRESSURE: 84 MMHG | BODY MASS INDEX: 33.37 KG/M2 | RESPIRATION RATE: 16 BRPM

## 2023-05-12 DIAGNOSIS — E78.5 DYSLIPIDEMIA: ICD-10-CM

## 2023-05-12 DIAGNOSIS — G61.81 CHRONIC INFLAMMATORY DEMYELINATING NEUROPATHY (HCC): ICD-10-CM

## 2023-05-12 DIAGNOSIS — E11.42 CONTROLLED TYPE 2 DIABETES MELLITUS WITH DIABETIC POLYNEUROPATHY, WITHOUT LONG-TERM CURRENT USE OF INSULIN (HCC): Primary | ICD-10-CM

## 2023-05-12 DIAGNOSIS — I10 ESSENTIAL HYPERTENSION: ICD-10-CM

## 2023-05-12 DIAGNOSIS — Z12.11 COLON CANCER SCREENING: ICD-10-CM

## 2023-05-12 SDOH — ECONOMIC STABILITY: FOOD INSECURITY: WITHIN THE PAST 12 MONTHS, THE FOOD YOU BOUGHT JUST DIDN'T LAST AND YOU DIDN'T HAVE MONEY TO GET MORE.: PATIENT DECLINED

## 2023-05-12 SDOH — ECONOMIC STABILITY: INCOME INSECURITY: HOW HARD IS IT FOR YOU TO PAY FOR THE VERY BASICS LIKE FOOD, HOUSING, MEDICAL CARE, AND HEATING?: PATIENT DECLINED

## 2023-05-12 SDOH — ECONOMIC STABILITY: FOOD INSECURITY: WITHIN THE PAST 12 MONTHS, YOU WORRIED THAT YOUR FOOD WOULD RUN OUT BEFORE YOU GOT MONEY TO BUY MORE.: PATIENT DECLINED

## 2023-05-12 SDOH — ECONOMIC STABILITY: HOUSING INSECURITY
IN THE LAST 12 MONTHS, WAS THERE A TIME WHEN YOU DID NOT HAVE A STEADY PLACE TO SLEEP OR SLEPT IN A SHELTER (INCLUDING NOW)?: PATIENT REFUSED

## 2023-05-12 ASSESSMENT — ENCOUNTER SYMPTOMS
RHINORRHEA: 0
BLURRED VISION: 0
DIARRHEA: 0
COUGH: 0
NAUSEA: 0
VOMITING: 0
BACK PAIN: 0
SHORTNESS OF BREATH: 0
SORE THROAT: 0
ORTHOPNEA: 0
CONSTIPATION: 0
WHEEZING: 0
ABDOMINAL PAIN: 0

## 2023-05-12 ASSESSMENT — PATIENT HEALTH QUESTIONNAIRE - PHQ9
SUM OF ALL RESPONSES TO PHQ9 QUESTIONS 1 & 2: 0
2. FEELING DOWN, DEPRESSED OR HOPELESS: 0
SUM OF ALL RESPONSES TO PHQ QUESTIONS 1-9: 0
1. LITTLE INTEREST OR PLEASURE IN DOING THINGS: 0

## 2023-05-12 NOTE — PROGRESS NOTES
Name: Felipe Sanders  : 1975         Chief Complaint:     Chief Complaint   Patient presents with    Diabetes     No complaints    Hypertension    Hyperlipidemia       History of Present Illness:      Felipe Sanders is a 50 y.o.  male who presents with Diabetes (No complaints), Hypertension, and Hyperlipidemia      Maria Guadalupe Mejia is here today for a routine office visit. Chronic inflammatory demyelinating disease-continues with prevention. Continues follow-up with specialist on a routine basis. Diabetes  He presents for his follow-up diabetic visit. He has type 2 diabetes mellitus. Onset time: YEARS. His disease course has been stable. There are no hypoglycemic associated symptoms. Pertinent negatives for hypoglycemia include no dizziness, headaches, nervousness/anxiousness or sleepiness. Associated symptoms include foot paresthesias. Pertinent negatives for diabetes include no blurred vision, no chest pain, no fatigue, no foot ulcerations, no polydipsia, no polyphagia, no polyuria, no weakness and no weight loss. There are no hypoglycemic complications. Symptoms are stable. Pertinent negatives for diabetic complications include no CVA, heart disease, nephropathy or peripheral neuropathy. Risk factors for coronary artery disease include diabetes mellitus, dyslipidemia, hypertension, male sex and obesity. Current diabetic treatment includes oral agent (dual therapy) and diet. He is compliant with treatment all of the time. His weight is stable. He is following a generally healthy diet. Meal planning includes avoidance of concentrated sweets. He has had a previous visit with a dietitian. He participates in exercise three times a week. There is no change in his home blood glucose trend. His breakfast blood glucose range is generally 110-130 mg/dl. An ACE inhibitor/angiotensin II receptor blocker is being taken. He does not see a podiatrist.Eye exam is not current. Hyperlipidemia  This is a chronic problem.

## 2024-01-11 ENCOUNTER — TELEPHONE (OUTPATIENT)
Dept: NEUROLOGY | Facility: HOSPITAL | Age: 49
End: 2024-01-11

## 2024-01-11 NOTE — TELEPHONE ENCOUNTER
Pt needs last notes from last appt to update for PA to [employer].  Pt insurance had changed on the Part D.  Pt needs to continue his medication for his infusion which is due next week.  Please send 7/23 notes to [employer] ASAP.  Pt does infusion monthly.  Did not copy DP since he is out ill.

## 2024-02-14 ENCOUNTER — APPOINTMENT (OUTPATIENT)
Dept: NEUROLOGY | Facility: HOSPITAL | Age: 49
End: 2024-02-14
Payer: MEDICARE

## 2024-02-28 ENCOUNTER — APPOINTMENT (OUTPATIENT)
Dept: NEUROLOGY | Facility: HOSPITAL | Age: 49
End: 2024-02-28
Payer: MEDICARE

## 2024-02-28 ENCOUNTER — OFFICE VISIT (OUTPATIENT)
Dept: PRIMARY CARE CLINIC | Age: 49
End: 2024-02-28

## 2024-02-28 VITALS
WEIGHT: 231.4 LBS | RESPIRATION RATE: 20 BRPM | OXYGEN SATURATION: 98 % | SYSTOLIC BLOOD PRESSURE: 124 MMHG | BODY MASS INDEX: 36.24 KG/M2 | HEART RATE: 82 BPM | DIASTOLIC BLOOD PRESSURE: 78 MMHG | TEMPERATURE: 98.7 F

## 2024-02-28 DIAGNOSIS — E11.42 CONTROLLED TYPE 2 DIABETES MELLITUS WITH DIABETIC POLYNEUROPATHY, WITHOUT LONG-TERM CURRENT USE OF INSULIN (HCC): Primary | ICD-10-CM

## 2024-02-28 DIAGNOSIS — J20.9 ACUTE BRONCHITIS, UNSPECIFIED ORGANISM: ICD-10-CM

## 2024-02-28 DIAGNOSIS — E78.5 DYSLIPIDEMIA: ICD-10-CM

## 2024-02-28 DIAGNOSIS — T75.3XXD MOTION SICKNESS, SUBSEQUENT ENCOUNTER: ICD-10-CM

## 2024-02-28 DIAGNOSIS — G61.81 CHRONIC INFLAMMATORY DEMYELINATING NEUROPATHY (HCC): ICD-10-CM

## 2024-02-28 DIAGNOSIS — E11.42 CONTROLLED TYPE 2 DIABETES MELLITUS WITH DIABETIC POLYNEUROPATHY, WITHOUT LONG-TERM CURRENT USE OF INSULIN (HCC): ICD-10-CM

## 2024-02-28 DIAGNOSIS — J01.00 ACUTE NON-RECURRENT MAXILLARY SINUSITIS: ICD-10-CM

## 2024-02-28 PROBLEM — M17.9 OSTEOARTHRITIS OF KNEE: Status: ACTIVE | Noted: 2024-02-28

## 2024-02-28 LAB — HBA1C MFR BLD: 5.7 %

## 2024-02-28 RX ORDER — AMOXICILLIN AND CLAVULANATE POTASSIUM 875; 125 MG/1; MG/1
1 TABLET, FILM COATED ORAL 2 TIMES DAILY
Qty: 14 TABLET | Refills: 0 | Status: SHIPPED | OUTPATIENT
Start: 2024-02-28 | End: 2024-03-06

## 2024-02-28 RX ORDER — SITAGLIPTIN AND METFORMIN HYDROCHLORIDE 1000; 50 MG/1; MG/1
1 TABLET, FILM COATED ORAL 2 TIMES DAILY WITH MEALS
Qty: 180 TABLET | Refills: 3 | Status: SHIPPED | OUTPATIENT
Start: 2024-02-28

## 2024-02-28 RX ORDER — SCOLOPAMINE TRANSDERMAL SYSTEM 1 MG/1
1 PATCH, EXTENDED RELEASE TRANSDERMAL
Qty: 4 PATCH | Refills: 0 | Status: SHIPPED | OUTPATIENT
Start: 2024-02-28

## 2024-02-28 RX ORDER — LOSARTAN POTASSIUM 100 MG/1
100 TABLET ORAL DAILY
Qty: 90 TABLET | Refills: 3 | Status: SHIPPED | OUTPATIENT
Start: 2024-02-28

## 2024-02-28 RX ORDER — PREDNISONE 20 MG/1
20 TABLET ORAL 2 TIMES DAILY
Qty: 10 TABLET | Refills: 0 | Status: SHIPPED | OUTPATIENT
Start: 2024-02-28 | End: 2024-03-04

## 2024-02-28 RX ORDER — ATORVASTATIN CALCIUM 40 MG/1
40 TABLET, FILM COATED ORAL DAILY
Qty: 90 TABLET | Refills: 3 | Status: SHIPPED | OUTPATIENT
Start: 2024-02-28

## 2024-02-28 SDOH — ECONOMIC STABILITY: HOUSING INSECURITY
IN THE LAST 12 MONTHS, WAS THERE A TIME WHEN YOU DID NOT HAVE A STEADY PLACE TO SLEEP OR SLEPT IN A SHELTER (INCLUDING NOW)?: PATIENT DECLINED

## 2024-02-28 SDOH — ECONOMIC STABILITY: FOOD INSECURITY: WITHIN THE PAST 12 MONTHS, THE FOOD YOU BOUGHT JUST DIDN'T LAST AND YOU DIDN'T HAVE MONEY TO GET MORE.: PATIENT DECLINED

## 2024-02-28 SDOH — ECONOMIC STABILITY: FOOD INSECURITY: WITHIN THE PAST 12 MONTHS, YOU WORRIED THAT YOUR FOOD WOULD RUN OUT BEFORE YOU GOT MONEY TO BUY MORE.: PATIENT DECLINED

## 2024-02-28 SDOH — ECONOMIC STABILITY: INCOME INSECURITY: HOW HARD IS IT FOR YOU TO PAY FOR THE VERY BASICS LIKE FOOD, HOUSING, MEDICAL CARE, AND HEATING?: PATIENT DECLINED

## 2024-02-28 ASSESSMENT — ENCOUNTER SYMPTOMS
DIARRHEA: 0
CONSTIPATION: 0
SHORTNESS OF BREATH: 0
WHEEZING: 0
COUGH: 1
SINUS PRESSURE: 1
BLURRED VISION: 0
TROUBLE SWALLOWING: 0
RHINORRHEA: 1
BACK PAIN: 0
VOMITING: 0
NAUSEA: 0
ABDOMINAL PAIN: 0
SINUS PAIN: 1
SORE THROAT: 1

## 2024-02-28 ASSESSMENT — PATIENT HEALTH QUESTIONNAIRE - PHQ9
2. FEELING DOWN, DEPRESSED OR HOPELESS: 0
SUM OF ALL RESPONSES TO PHQ9 QUESTIONS 1 & 2: 0
SUM OF ALL RESPONSES TO PHQ QUESTIONS 1-9: 0
1. LITTLE INTEREST OR PLEASURE IN DOING THINGS: 0

## 2024-02-28 NOTE — PROGRESS NOTES
Name: Nick No  : 1975         Chief Complaint:     Chief Complaint   Patient presents with    Diabetes     Routine check, no concerns.    URI    Motion Sickness     Would like scop patch for travel on up coming cruise       History of Present Illness:      Nick No is a 49 y.o.  male who presents with Diabetes (Routine check, no concerns.), URI, and Motion Sickness (Would like scop patch for travel on up coming cruise)      Justin is here today for a routine office visit.    Chronic inflammatory demyelinating disease-continues with prevention.  Continues follow-up with specialist on a routine basis.    Motion sickness-patient is going on a cruise and would like scopolamine patches.  He states he has used these in the past and did really well.  He has never been on a cruise so he is concerned.  He is use them for flying.    Diabetes  He presents for his follow-up diabetic visit. He has type 2 diabetes mellitus. Onset time: YEARS. His disease course has been stable. There are no hypoglycemic associated symptoms. Pertinent negatives for hypoglycemia include no dizziness, headaches, nervousness/anxiousness or sleepiness. Associated symptoms include foot paresthesias. Pertinent negatives for diabetes include no blurred vision, no chest pain, no fatigue, no foot ulcerations, no polydipsia, no polyphagia, no polyuria, no weakness and no weight loss. There are no hypoglycemic complications. Symptoms are stable. Pertinent negatives for diabetic complications include no CVA, heart disease, nephropathy or peripheral neuropathy. Risk factors for coronary artery disease include diabetes mellitus, dyslipidemia, hypertension, male sex and obesity. Current diabetic treatment includes oral agent (dual therapy) and diet. He is compliant with treatment all of the time. His weight is stable. He is following a generally healthy diet. Meal planning includes avoidance of concentrated sweets. He has had a previous

## 2024-02-28 NOTE — TELEPHONE ENCOUNTER
Health Maintenance   Topic Date Due    Colorectal Cancer Screen  Never done    Annual Wellness Visit (Medicare)  Never done    Diabetic foot exam  02/28/2025 (Originally 2/4/2021)    Hepatitis B vaccine (1 of 3 - 3-dose series) 02/28/2025 (Originally 1975)    DTaP/Tdap/Td vaccine (1 - Tdap) 02/28/2025 (Originally 1/14/1994)    Flu vaccine (1) 02/28/2025 (Originally 8/1/2023)    Pneumococcal 0-64 years Vaccine (1 - PCV) 02/28/2025 (Originally 1/14/1981)    COVID-19 Vaccine (4 - 2023-24 season) 02/28/2025 (Originally 9/1/2023)    Hepatitis C screen  02/28/2025 (Originally 1/14/1993)    HIV screen  02/28/2025 (Originally 1/14/1990)    Diabetic retinal exam  03/09/2025 (Originally 1/14/1993)    Diabetic Alb to Cr ratio (uACR) test  05/10/2024    Lipids  05/10/2024    GFR test (Diabetes, CKD 3-4, OR last GFR 15-59)  05/10/2024    Depression Screen  05/12/2024    A1C test (Diabetic or Prediabetic)  02/28/2025    Hepatitis A vaccine  Aged Out    Hib vaccine  Aged Out    Polio vaccine  Aged Out    Meningococcal (ACWY) vaccine  Aged Out             (applicable per patient's age: Cancer Screenings, Depression Screening, Fall Risk Screening, Immunizations)    Hemoglobin A1C (%)   Date Value   02/28/2024 5.7   05/10/2023 5.3   10/14/2022 5.0     LDL Cholesterol (mg/dL)   Date Value   05/10/2023 93     AST (U/L)   Date Value   05/10/2023 24     ALT (U/L)   Date Value   05/10/2023 18     BUN (mg/dL)   Date Value   05/10/2023 24 (H)      (goal A1C is < 7)   (goal LDL is <100) need 30-50% reduction from baseline     BP Readings from Last 3 Encounters:   02/28/24 124/78   05/12/23 126/84   10/14/22 122/74    (goal /80)      All Future Testing planned in [facility]:  Lab Frequency Next Occurrence   CBC with Auto Differential Once 08/26/2024   ALT Once 08/28/2024   AST Once 08/28/2024   Hemoglobin A1C Once 08/26/2024   Lipid Panel Once 08/26/2024   Microalbumin, Ur Once 08/26/2024   Basic Metabolic Panel Once 08/28/2024

## 2024-02-28 NOTE — PATIENT INSTRUCTIONS
SURVEY:     You may be receiving a survey from [facility] [employer] regarding your visit today.     Please complete the survey to enable us to provide the highest quality of care to you and your family.     If you cannot score us a very good on any question, please call the office to discuss how we could have made your experience a very good one.     Thank you,    Reed Teague, APRN-CNP  Arminda Paris, APRN-CNP  Katharine, LPN  Clari, CMA  Addison, CMA  Coretta, CMA  Lore, PCA  Chiquita, CMA  Heike, PM

## 2024-03-05 DIAGNOSIS — E78.5 DYSLIPIDEMIA: ICD-10-CM

## 2024-03-05 DIAGNOSIS — E11.42 CONTROLLED TYPE 2 DIABETES MELLITUS WITH DIABETIC POLYNEUROPATHY, WITHOUT LONG-TERM CURRENT USE OF INSULIN (HCC): ICD-10-CM

## 2024-03-05 RX ORDER — SITAGLIPTIN AND METFORMIN HYDROCHLORIDE 1000; 50 MG/1; MG/1
1 TABLET, FILM COATED ORAL 2 TIMES DAILY WITH MEALS
Qty: 180 TABLET | Refills: 3 | OUTPATIENT
Start: 2024-03-05

## 2024-03-05 RX ORDER — ATORVASTATIN CALCIUM 40 MG/1
40 TABLET, FILM COATED ORAL DAILY
Qty: 90 TABLET | Refills: 3 | OUTPATIENT
Start: 2024-03-05

## 2024-03-05 RX ORDER — LOSARTAN POTASSIUM 100 MG/1
100 TABLET ORAL DAILY
Qty: 90 TABLET | Refills: 3 | OUTPATIENT
Start: 2024-03-05

## 2024-03-06 DIAGNOSIS — E78.5 DYSLIPIDEMIA: ICD-10-CM

## 2024-03-06 DIAGNOSIS — E11.42 CONTROLLED TYPE 2 DIABETES MELLITUS WITH DIABETIC POLYNEUROPATHY, WITHOUT LONG-TERM CURRENT USE OF INSULIN (HCC): ICD-10-CM

## 2024-03-06 RX ORDER — ATORVASTATIN CALCIUM 40 MG/1
40 TABLET, FILM COATED ORAL DAILY
Qty: 90 TABLET | Refills: 3 | Status: SHIPPED | OUTPATIENT
Start: 2024-03-06

## 2024-03-06 RX ORDER — LOSARTAN POTASSIUM 100 MG/1
100 TABLET ORAL DAILY
Qty: 90 TABLET | Refills: 3 | Status: SHIPPED | OUTPATIENT
Start: 2024-03-06

## 2024-03-06 RX ORDER — SITAGLIPTIN AND METFORMIN HYDROCHLORIDE 1000; 50 MG/1; MG/1
1 TABLET, FILM COATED ORAL 2 TIMES DAILY WITH MEALS
Qty: 180 TABLET | Refills: 3 | Status: SHIPPED | OUTPATIENT
Start: 2024-03-06

## 2024-03-06 RX ORDER — SITAGLIPTIN AND METFORMIN HYDROCHLORIDE 1000; 50 MG/1; MG/1
1 TABLET, FILM COATED ORAL 2 TIMES DAILY WITH MEALS
Qty: 180 TABLET | Refills: 3 | OUTPATIENT
Start: 2024-03-06

## 2024-03-06 RX ORDER — LOSARTAN POTASSIUM 100 MG/1
100 TABLET ORAL DAILY
Qty: 90 TABLET | Refills: 3 | OUTPATIENT
Start: 2024-03-06

## 2024-03-06 RX ORDER — ATORVASTATIN CALCIUM 40 MG/1
40 TABLET, FILM COATED ORAL DAILY
Qty: 90 TABLET | Refills: 3 | OUTPATIENT
Start: 2024-03-06

## 2024-03-06 NOTE — TELEPHONE ENCOUNTER
Patients medications were sent on 2/28/2024 and patient stated he called the pharmacy and they said they do not have the medications. Can you please resend?  Thank you

## 2024-04-29 ENCOUNTER — TELEPHONE (OUTPATIENT)
Dept: PRIMARY CARE CLINIC | Age: 49
End: 2024-04-29

## 2024-06-21 ENCOUNTER — TELEPHONE (OUTPATIENT)
Dept: PRIMARY CARE CLINIC | Age: 49
End: 2024-06-21

## 2024-07-03 ENCOUNTER — OFFICE VISIT (OUTPATIENT)
Dept: NEUROLOGY | Facility: HOSPITAL | Age: 49
End: 2024-07-03
Payer: MEDICARE

## 2024-07-03 VITALS
RESPIRATION RATE: 18 BRPM | BODY MASS INDEX: 36.57 KG/M2 | SYSTOLIC BLOOD PRESSURE: 130 MMHG | HEART RATE: 83 BPM | DIASTOLIC BLOOD PRESSURE: 85 MMHG | HEIGHT: 67 IN | WEIGHT: 233 LBS

## 2024-07-03 DIAGNOSIS — G61.81 CIDP (CHRONIC INFLAMMATORY DEMYELINATING POLYNEUROPATHY) (MULTI): Primary | ICD-10-CM

## 2024-07-03 PROCEDURE — 99214 OFFICE O/P EST MOD 30 MIN: CPT | Mod: GC | Performed by: PSYCHIATRY & NEUROLOGY

## 2024-07-03 PROCEDURE — 99214 OFFICE O/P EST MOD 30 MIN: CPT | Performed by: PSYCHIATRY & NEUROLOGY

## 2024-07-03 RX ORDER — SITAGLIPTIN AND METFORMIN HYDROCHLORIDE 1000; 50 MG/1; MG/1
1 TABLET, FILM COATED ORAL
COMMUNITY

## 2024-07-03 RX ORDER — HUMAN IMMUNOGLOBULIN G 5 G/50ML
90 LIQUID INTRAVENOUS ONCE
COMMUNITY

## 2024-07-03 RX ORDER — LOSARTAN POTASSIUM 100 MG/1
100 TABLET ORAL DAILY
COMMUNITY

## 2024-07-03 RX ORDER — ATORVASTATIN CALCIUM 40 MG/1
40 TABLET, FILM COATED ORAL DAILY
COMMUNITY

## 2024-07-03 ASSESSMENT — PATIENT HEALTH QUESTIONNAIRE - PHQ9
1. LITTLE INTEREST OR PLEASURE IN DOING THINGS: NOT AT ALL
SUM OF ALL RESPONSES TO PHQ9 QUESTIONS 1 AND 2: 0
2. FEELING DOWN, DEPRESSED OR HOPELESS: NOT AT ALL

## 2024-07-03 ASSESSMENT — PAIN SCALES - GENERAL: PAINLEVEL: 0-NO PAIN

## 2024-07-03 ASSESSMENT — ENCOUNTER SYMPTOMS
LOSS OF SENSATION IN FEET: 0
DEPRESSION: 0
OCCASIONAL FEELINGS OF UNSTEADINESS: 0

## 2024-07-03 ASSESSMENT — COLUMBIA-SUICIDE SEVERITY RATING SCALE - C-SSRS
2. HAVE YOU ACTUALLY HAD ANY THOUGHTS OF KILLING YOURSELF?: NO
1. IN THE PAST MONTH, HAVE YOU WISHED YOU WERE DEAD OR WISHED YOU COULD GO TO SLEEP AND NOT WAKE UP?: NO
6. HAVE YOU EVER DONE ANYTHING, STARTED TO DO ANYTHING, OR PREPARED TO DO ANYTHING TO END YOUR LIFE?: NO

## 2024-07-03 NOTE — PROGRESS NOTES
History of Present Illness:   Stanley Etienne is a 49 year old right-handed man with history of T2DM, HTN, HLD, CIDP receiving monthly IVIG who presents for [facility].     Last seen in 7/2023, plan at the time was to continue monthly IVIG.   Overall, he feels doing well, slowly motor function better better. Feels his fine motor skills doing better   IVIG last 2 weeks ago, next infusion on 7/18  No issues with infusion. No recent URI/UTI, no blood clots.  Still not working.   Still some diffiuclty holding pen and silverware  Not dropping things as much anymore    No new medical diagnoses/procedures/medications/major medical changes.    Past Medical History:    has a past medical history of Abdominal hernia, Diabetes (Multi), Hypertension, Personal history of other diseases of the digestive system, Personal history of other diseases of the nervous system and sense organs, and Serum lipids high.    Past Surgical History:    has a past surgical history that includes Other surgical history (05/20/2020); Other surgical history (05/20/2020); Other surgical history (05/20/2020); Ulnar nerve repair; Carpal tunnel release; and Abdominal hernia repair.    Family History:   Family History   Problem Relation Name Age of Onset    Pancreatic cancer Mother         Past Social History:    reports that he has never smoked. He has never used smokeless tobacco. He reports that he does not currently use alcohol. He reports that he does not use drugs.    Allergies:   No Known Allergies    Medications:   Reviewed, as per chart review    ---------------------------------------------------------------    Objective:  Vitals  Vitals:    07/03/24 1053   BP: 130/85   Pulse: 83   Resp: 18     Physical Exam:  General: NAD, NC/AT    Neurological Exam:  A&Ox4  Language fluent    CN: EOM full range, smile symmetric    MOTOR:   Normal tone in BUE, paratonia in BLE  Mild postural  tremor    STRENGTH: R L  Deltoid  5 5  Biceps  5 5  Triceps  5 5    5 5  Thumb Abd 5 5  Finger ext 5 5  Finger abd 4 4  Finger add 4 4  Finger Flex 4 4    Hip flexion 5 5  Quadriceps 5 5  Hamstrings 5 5  DorsiFlex 5 5  PlantarFlex 5 5  Maged  5 5  Invert   5  5    REFLEXES: R L  Biceps  +2 +2  Triceps  +2 +2  BR   +2 +2  Patellar  +1 +1 (with Reinforcement)  Achilles +1 +1 (with Reinforcement)  Plantar  Down Down    No Varma  No crossed adductors  No clonus    COORDINATION:   Intact on finger to nose bl; sensory ataxia with eyes closed    SENSORY:  Intact to light touch in bl UE and LE  Vibration impaired in BLE (reduced up to knees)   Proprioception intact in toes and fingers  ROMBERG: Negative  GAIT: Narrow base, able to toe, heel, and tandem walk     ---------------------------------------------------------------    Assessment:  49 year old right-handed man with history of T2DM, HTN, HLD, CIDP receiving monthly IVIG. He has showed steady gradual improvement since starting IVIG. On exam, he has full strength except for some weakness with finger flexion/adduction/abduction. He has reduced vibration sense but reportedly improved since initial IVIG diagnosis.     Since he continues to have improved strength, he would benefit from further treatment with IVIG at the current dose.    Plan:  #CIDP  - Continue IVIG infusion (renewal for next 6 month  - OT referral (provided today)    Follow up virtually in 6 months     Viky Manuel MD  PGY-4 Neurology    Attending addendum:    I saw and examined the patient and agree with the findings noted above.  He has a longstanding CIDP on intravenous gammaglobulin.  We brought him in to do his examination and to assess how he is progressing.  He tells us he is stable and slowly continues to improve.  Sensation is improved.  Walking is improved.  There is no following.  Nothing is worse.  He is taking no new medications.  He has no known allergies.    On examination, his mental  status normal.  There is no cranial nerve deficit.  Motor tone and bulk are normal.  Strength is normal in the lower extremities.  In the upper extremities are slight weakness of intrinsic hand movements.  Reflexes are hypoactive.  Vibration sense is diminished distally in the hands and feet.  However position sense is very good.  Finger-nose testing is done well.  However rapid altering movements are somewhat slowed in the upper extremities.  Gait is intact.    Impression: CIDP, responding well to IVIG.  The patient is doing quite well.  He has no side effects on the IVIG.  We are to continue as is.  When he reaches a point of maximal medical improvement we will then slowly decrease the frequency.  I like to see him back in 6 months for a virtual appointment and then 12 months for an in person appointment.  He is agreeable to the above plan.    CODING and DOCUMENTATION DETERMINATION    The total appointment time today was 32 minutes. Time included preparing to see the patient; obtaining the history; [performing a medically necessary appropriate physical examination;] counseling and educating the patient; ordering medications; and documenting clinical information in the medical record.

## 2024-08-22 NOTE — PROGRESS NOTES
Name: Wilhelminia Boas  : 1975         Chief Complaint:     Chief Complaint   Patient presents with    Diabetes     Routine office visit.  Hyperlipidemia    Hypertension    Arm Problem     Bilat arms. History of Present Illness:      Wilhelminia Boas is a 39 y.o.  male who presents with Diabetes (Routine office visit. ); Hyperlipidemia; Hypertension; and Arm Problem (Bilat arms. )      Sammy Cummings is here today for a routine office visit. Bilateral upper extremity paresthesias-patient has longstanding history of problems with his forearms and hands. Patient was previously patient of Dr. Shanna Puri at [facility] but is unable to see him due to insurance. Patient is having more neurologic symptoms at this time with regard to numbness tingling and weakness. Diabetes   He presents for his follow-up diabetic visit. He has type 2 diabetes mellitus. His disease course has been stable. There are no hypoglycemic associated symptoms. Pertinent negatives for hypoglycemia include no dizziness, headaches, nervousness/anxiousness or sleepiness. Associated symptoms include foot paresthesias and weakness. Pertinent negatives for diabetes include no chest pain, no fatigue, no foot ulcerations, no polydipsia, no polyphagia and no polyuria. There are no hypoglycemic complications. Symptoms are stable. Pertinent negatives for diabetic complications include no CVA, heart disease, nephropathy or peripheral neuropathy. Risk factors for coronary artery disease include diabetes mellitus, dyslipidemia, hypertension, male sex and obesity. Current diabetic treatment includes oral agent (dual therapy) and diet. He is compliant with treatment all of the time. His weight is stable. He is following a generally healthy diet. Meal planning includes avoidance of concentrated sweets. He has had a previous visit with a dietitian. He participates in exercise three times a week. There is no change in his home blood glucose trend.  His breakfast blood glucose range is generally 110-130 mg/dl. An ACE inhibitor/angiotensin II receptor blocker is being taken. He does not see a podiatrist.Eye exam is not current. Hyperlipidemia   This is a chronic problem. The current episode started more than 1 year ago. The problem is uncontrolled. Recent lipid tests were reviewed and are high. Exacerbating diseases include diabetes and obesity. He has no history of chronic renal disease, hypothyroidism or liver disease. Factors aggravating his hyperlipidemia include fatty foods. Pertinent negatives include no chest pain, leg pain, myalgias or shortness of breath. Current antihyperlipidemic treatment includes statins. The current treatment provides moderate improvement of lipids. There are no compliance problems. Risk factors for coronary artery disease include hypertension, male sex, dyslipidemia, diabetes mellitus, obesity and family history. Hypertension   This is a chronic problem. The current episode started more than 1 year ago. The problem has been gradually worsening since onset. The problem is controlled. Pertinent negatives include no chest pain, headaches, neck pain, palpitations, peripheral edema or shortness of breath. There are no associated agents to hypertension. Risk factors for coronary artery disease include diabetes mellitus, dyslipidemia, male gender and sedentary lifestyle. Past treatments include angiotensin blockers. The current treatment provides moderate improvement. Compliance problems include exercise and diet. There is no history of kidney disease, CAD/MI or CVA. There is no history of chronic renal disease. Erectile Dysfunction   This is a new problem. The current episode started more than 1 month ago. The problem is unchanged. The nature of his difficulty is achieving erection and maintaining erection. He reports no anxiety, decreased libido or performance anxiety. He reports his erection duration to be less than 1 minute. moist.      Pharynx: No posterior oropharyngeal erythema. Eyes:      Conjunctiva/sclera: Conjunctivae normal.   Neck:      Musculoskeletal: Normal range of motion and neck supple. Cardiovascular:      Rate and Rhythm: Normal rate and regular rhythm. Pulses:           Dorsalis pedis pulses are 2+ on the right side and 2+ on the left side. Heart sounds: No murmur. Pulmonary:      Effort: Pulmonary effort is normal.      Breath sounds: Normal breath sounds. No wheezing. Abdominal:      General: Bowel sounds are normal. There is no distension. Palpations: Abdomen is soft. Tenderness: There is no abdominal tenderness. Musculoskeletal:         General: Tenderness (Bilateral forearms) present. Right foot: Normal range of motion. No deformity. Left foot: Normal range of motion. No deformity. Feet:      Right foot:      Protective Sensation: 5 sites tested. 4 sites sensed. Skin integrity: Skin integrity normal.      Toenail Condition: Right toenails are normal.      Left foot:      Protective Sensation: 5 sites tested. 4 sites sensed. Skin integrity: Skin integrity normal.      Toenail Condition: Left toenails are normal.   Skin:     General: Skin is warm and dry. Findings: No rash. Neurological:      Mental Status: He is alert and oriented to person, place, and time. Motor: Weakness (Bilateral hands) present. Psychiatric:         Attention and Perception: He is attentive.          Mood and Affect: Mood normal.         Behavior: Behavior normal.         Data:     Lab Results   Component Value Date     02/03/2020    K 4.3 02/03/2020     02/03/2020    CO2 24 02/03/2020    BUN 22 02/03/2020    CREATININE 0.82 02/03/2020    GLUCOSE 96 02/03/2020    PROT 7.8 09/29/2017    LABALBU 4.3 09/29/2017    BILITOT 0.52 09/29/2017    ALKPHOS 82 09/29/2017    AST 23 02/03/2020    ALT 19 02/03/2020     Lab Results   Component Value Date    WBC 7.7 02/03/2020    RBC 5.06 02/03/2020    HGB 14.5 02/03/2020    HCT 44.6 02/03/2020    MCV 88.1 02/03/2020    MCH 28.7 02/03/2020    MCHC 32.5 02/03/2020    RDW 13.2 02/03/2020     02/03/2020    MPV 9.7 02/03/2020     No results found for: TSH  Lab Results   Component Value Date    CHOL 143 02/03/2020    HDL 57 02/03/2020    LABA1C 5.4 02/03/2020       Assessment/Plan:      Diagnosis Orders   1. Controlled type 2 diabetes mellitus with diabetic polyneuropathy, without long-term current use of insulin ([facility])   DIABETES FOOT EXAM    losartan (COZAAR) 100 MG tablet    Basic Metabolic Panel    ALT    AST    Hemoglobin A1C    Microalbumin, Ur   2. Dyslipidemia     3. Essential hypertension     4. Paresthesia and pain of both upper extremities  Amparo Mcfarland MD, Neurology, [place]   5. Erectile dysfunction due to diseases classified elsewhere  sildenafil (VIAGRA) 50 MG tablet     Continue all current medications and we will increase losartan to 100 mg daily. We will recheck his blood pressure in 2 weeks. We will refer him to neurology for evaluation of his bilateral upper extremity numbness and weakness. 1.  Astrid Lockwood received counseling on the following healthy behaviors: nutrition, exercise and medication adherence  2. Patient given educational materials - see patient instructions  3. Was a self-tracking handout given in paper form or via [employer]? No  If yes, see orders or list here. 4.  Discussed use, benefit, and side effects of prescribed medications. Barriers to medication compliance addressed. All patient questions answered. Pt voiced understanding. 5.  Reviewed prior labs and health maintenance  6. Continue current medications, diet and exercise.     Completed Refills   Requested Prescriptions     Signed Prescriptions Disp Refills    losartan (COZAAR) 100 MG tablet 90 tablet 3     Sig: Take 1 tablet by mouth daily    sildenafil (VIAGRA) 50 MG tablet 30 tablet 3     Sig: Take 1 tablet by mouth as needed Severe episode of recurrent major depressive disorder, without psychotic features for Erectile Dysfunction         Return in about 6 months (around 8/4/2020) for check up.

## 2024-10-03 ENCOUNTER — TELEPHONE (OUTPATIENT)
Dept: PRIMARY CARE CLINIC | Age: 49
End: 2024-10-03

## 2024-10-18 ENCOUNTER — TELEPHONE (OUTPATIENT)
Dept: PRIMARY CARE CLINIC | Age: 49
End: 2024-10-18

## 2024-12-30 ENCOUNTER — TELEPHONE (OUTPATIENT)
Dept: PRIMARY CARE CLINIC | Age: 49
End: 2024-12-30

## 2025-01-06 ENCOUNTER — TELEPHONE (OUTPATIENT)
Dept: PRIMARY CARE CLINIC | Age: 50
End: 2025-01-06

## 2025-01-12 NOTE — PROGRESS NOTES
History of Present Illness:   Stanley Etienne is a 50 year old right-handed man with history of T2DM, HTN, HLD, CIDP receiving monthly IVIG who presents for FUV.     Last seen in 7/2024, overall was doing well at that time. Plan was to continue monthly IVIG.    Interval Hx:  Feels the weakness in his hands are slowly improving (e.g. increase  strength in hands, improved writing). Since last visit, he started going to OT regularly and continues to do so. As a result, he feels dexterity in both hands has improved.   Last IVIG 12/29-30/2024, next infusion end of this month.  Tolerating infusions well without any side effects.    He still has difficulty buttoning up his shirt.   He reports he still needs to wear slip on shoes and needs help putting on socks.    No major medical diagnoses, surgeries, illnesses, or hospitalizations since last visit.     Past Medical History:    has a past medical history of Abdominal hernia, Diabetes (Multi), Hypertension, Personal history of other diseases of the digestive system, Personal history of other diseases of the nervous system and sense organs, and Serum lipids high.    Past Surgical History:    has a past surgical history that includes Other surgical history (05/20/2020); Other surgical history (05/20/2020); Other surgical history (05/20/2020); Ulnar nerve repair; Carpal tunnel release; and Abdominal hernia repair.    Family History:   Family History   Problem Relation Name Age of Onset    Pancreatic cancer Mother         Past Social History:    reports that he has never smoked. He has never used smokeless tobacco. He reports that he does not currently use alcohol. He reports that he does not use drugs.    Allergies:   No Known Allergies    Medications:  Current Outpatient Medications   Medication Instructions    atorvastatin (LIPITOR) 40 mg, oral, Daily    immune globulin, human, (Privigen) infusion 90 g, intravenous, Once, Divided over 2 days every 4 weeks    losartan  (COZAAR) 100 mg, oral, Daily    SITagliptin phos-metformin (Janumet) 50-1,000 mg tablet 1 tablet, oral, 2 times daily (morning and late afternoon)       ---------------------------------------------------------------    Objective:  Vitals:  Vitals:    01/15/25 1010   BP: (!) 146/91   Pulse: 93   Resp: 18   Temp: 36.2 °C (97.1 °F)     Physical Exam:  Neurological Exam:  A&Ox4  Language fluent  CN: EOM full range, smile symmetric     MOTOR:   Normal tone in BUE and BLE  Mild postural tremor  Unable to stand from seated position with arms crossed     STRENGTH:    R          L  Deltoid             5          5  Biceps              5          5  Triceps             5          5                   5          5  Wrist flex 5 4+  Wrist ext 5 5  Thumb Abd 5          5  Thumb Flex  5 5  Finger ext 5 5  Finger abd 4 4  Finger add 4 4  Finger Flex 4+ 4+     Hip flexion 5 5  Quadriceps 5 5  Hamstrings 5 5  DorsiFlex 5 5  PlantarFlex 5          5  Maged                5          5  Invert               5          5     REFLEXES:       R          L  Biceps              +2        +2 (reinforcement)  Triceps             +2        +2 (reinforcement)  BR                    +2        +2 (reinforcement)  Patellar            +1        +1 (with Reinforcement)  Achilles+1        +1 (with Reinforcement)  Plantar             Down   Down     No Varma  No crossed adductors  No clonus     COORDINATION:  Intact on finger to nose bl; sensory ataxia with eyes closed     SENSORY:  Intact to light touch in bl UE and LE  GAIT: Narrow base; able to toe, heel with arms abducted    ---------------------------------------------------------------    Assessment:  50 year old right-handed man with history of T2DM, HTN, HLD, CIDP receiving monthly IVIG. He has showed steady gradual improvement since starting IVIG. On exam, he has full strength except for some weakness with finger flexion/adduction/abduction. He does have difficulty with sitting from  seated with arms abducted. During last visit, he had reduced vibration sense but reportedly improved since initial IVIG diagnosis. Since he continues to have improved strength, he would benefit from further treatment at current dose and frequency of IVIG. In the future, once patient reaches peak sensory improvement by either slightly reducing the dose or slightly increasing the frequency of IVIG.    Today, we counseled patient on how, with his history of CIDP, it would be reasonable to avoid vaccinations in the future.      Plan:  #CIDP  - Continue monthly IVIG infusion   - Continue with OT     Follow up in 6 months      Viky Manuel MD  PGY-4 Neurology    Attending addendum:    I saw and examined the patient and agree with the findings noted above.  Although he is still disabled, he is still making slow and steady progress.  His arm strength is improved.  His walking is improved.  His elementary strength exam looks very good.  I can get reflexes in his arms but not his legs.  However he cannot arise from a low chair without using his arms.  He has some slight difficulty walking on his heels and toes.  His vibration sense is down.    Impression: CIDP, continuing to slowly respond to IVIG.  I would like to continue the same dose as he continues to improve.  I think most of his deficit now is now sensory.  The motors have responded very well.  As he has no side effects of the IVIG, I do not want to change the dose.  1 we eventually had a time where he has reached maximal medical improvement then we will consider reducing the frequency.  We also talked about the pros and cons of getting the flu shot and other vaccinations with CIDP.    CODING and DOCUMENTATION DETERMINATION    The total appointment time today was 32 minutes. Time included preparing to see the patient; obtaining the history; this counseling and educating the patient; ordering medication; and documenting clinical information in the medical record.

## 2025-01-15 ENCOUNTER — OFFICE VISIT (OUTPATIENT)
Dept: NEUROLOGY | Facility: HOSPITAL | Age: 50
End: 2025-01-15
Payer: MEDICARE

## 2025-01-15 ENCOUNTER — HOSPITAL ENCOUNTER (OUTPATIENT)
Age: 50
Discharge: HOME | End: 2025-01-15
Payer: MEDICARE

## 2025-01-15 VITALS
WEIGHT: 256 LBS | HEIGHT: 67 IN | TEMPERATURE: 97.1 F | HEART RATE: 93 BPM | BODY MASS INDEX: 40.18 KG/M2 | DIASTOLIC BLOOD PRESSURE: 91 MMHG | RESPIRATION RATE: 18 BRPM | SYSTOLIC BLOOD PRESSURE: 146 MMHG

## 2025-01-15 DIAGNOSIS — G61.81 CIDP (CHRONIC INFLAMMATORY DEMYELINATING POLYNEUROPATHY) (MULTI): Primary | ICD-10-CM

## 2025-01-15 DIAGNOSIS — E11.42 CONTROLLED TYPE 2 DIABETES MELLITUS WITH DIABETIC POLYNEUROPATHY, WITHOUT LONG-TERM CURRENT USE OF INSULIN (HCC): ICD-10-CM

## 2025-01-15 LAB
ALT SERPL-CCNC: 22 U/L (ref 10–50)
ANION GAP SERPL CALCULATED.3IONS-SCNC: 12 MMOL/L (ref 9–16)
AST SERPL-CCNC: 24 U/L (ref 10–50)
BASOPHILS # BLD: 0.07 K/UL (ref 0–0.2)
BASOPHILS NFR BLD: 1 % (ref 0–2)
BUN SERPL-MCNC: 17 MG/DL (ref 6–20)
BUN/CREAT SERPL: 15 (ref 9–20)
CALCIUM SERPL-MCNC: 9 MG/DL (ref 8.6–10.4)
CHLORIDE SERPL-SCNC: 101 MMOL/L (ref 98–107)
CHOLEST SERPL-MCNC: 129 MG/DL (ref 0–199)
CHOLESTEROL/HDL RATIO: 3
CO2 SERPL-SCNC: 23 MMOL/L (ref 20–31)
CREAT SERPL-MCNC: 1.1 MG/DL (ref 0.7–1.2)
CREAT UR-MCNC: 47.5 MG/DL (ref 39–259)
EOSINOPHIL # BLD: 0.15 K/UL (ref 0–0.44)
EOSINOPHILS RELATIVE PERCENT: 2 % (ref 1–4)
ERYTHROCYTE [DISTWIDTH] IN BLOOD BY AUTOMATED COUNT: 14.1 % (ref 11.8–14.4)
EST. AVERAGE GLUCOSE BLD GHB EST-MCNC: 114 MG/DL
GFR, ESTIMATED: 86 ML/MIN/1.73M2
GLUCOSE SERPL-MCNC: 110 MG/DL (ref 74–99)
HBA1C MFR BLD: 5.6 % (ref 4–6)
HCT VFR BLD AUTO: 38.8 % (ref 40.7–50.3)
HDLC SERPL-MCNC: 43 MG/DL
HGB BLD-MCNC: 13 G/DL (ref 13–17)
IMM GRANULOCYTES # BLD AUTO: 0.04 K/UL (ref 0–0.3)
IMM GRANULOCYTES NFR BLD: 1 %
LDLC SERPL CALC-MCNC: 67 MG/DL (ref 0–100)
LYMPHOCYTES NFR BLD: 2.72 K/UL (ref 1.1–3.7)
LYMPHOCYTES RELATIVE PERCENT: 32 % (ref 24–43)
MCH RBC QN AUTO: 28.9 PG (ref 25.2–33.5)
MCHC RBC AUTO-ENTMCNC: 33.5 G/DL (ref 28.4–34.8)
MCV RBC AUTO: 86.2 FL (ref 82.6–102.9)
MICROALBUMIN UR-MCNC: <12 MG/L (ref 0–20)
MICROALBUMIN/CREAT UR-RTO: NORMAL MCG/MG CREAT (ref 0–17)
MONOCYTES NFR BLD: 0.87 K/UL (ref 0.1–1.2)
MONOCYTES NFR BLD: 10 % (ref 3–12)
NEUTROPHILS NFR BLD: 54 % (ref 36–65)
NEUTS SEG NFR BLD: 4.63 K/UL (ref 1.5–8.1)
NRBC BLD-RTO: 0 PER 100 WBC
PLATELET # BLD AUTO: 303 K/UL (ref 138–453)
PMV BLD AUTO: 9.8 FL (ref 8.1–13.5)
POTASSIUM SERPL-SCNC: 4.1 MMOL/L (ref 3.7–5.3)
RBC # BLD AUTO: 4.5 M/UL (ref 4.21–5.77)
SODIUM SERPL-SCNC: 136 MMOL/L (ref 136–145)
TRIGL SERPL-MCNC: 95 MG/DL
VLDLC SERPL CALC-MCNC: 19 MG/DL (ref 1–30)
WBC OTHER # BLD: 8.5 K/UL (ref 3.5–11.3)

## 2025-01-15 PROCEDURE — 83036 HEMOGLOBIN GLYCOSYLATED A1C: CPT

## 2025-01-15 PROCEDURE — 36415 COLL VENOUS BLD VENIPUNCTURE: CPT

## 2025-01-15 PROCEDURE — 99214 OFFICE O/P EST MOD 30 MIN: CPT | Mod: GC | Performed by: PSYCHIATRY & NEUROLOGY

## 2025-01-15 PROCEDURE — 82570 ASSAY OF URINE CREATININE: CPT

## 2025-01-15 PROCEDURE — 84460 ALANINE AMINO (ALT) (SGPT): CPT

## 2025-01-15 PROCEDURE — 99214 OFFICE O/P EST MOD 30 MIN: CPT | Performed by: PSYCHIATRY & NEUROLOGY

## 2025-01-15 PROCEDURE — 84450 TRANSFERASE (AST) (SGOT): CPT

## 2025-01-15 PROCEDURE — 3008F BODY MASS INDEX DOCD: CPT | Performed by: PSYCHIATRY & NEUROLOGY

## 2025-01-15 PROCEDURE — 82043 UR ALBUMIN QUANTITATIVE: CPT

## 2025-01-15 PROCEDURE — 80061 LIPID PANEL: CPT

## 2025-01-15 PROCEDURE — 85025 COMPLETE CBC W/AUTO DIFF WBC: CPT

## 2025-01-15 PROCEDURE — 80048 BASIC METABOLIC PNL TOTAL CA: CPT

## 2025-01-15 ASSESSMENT — PAIN SCALES - GENERAL: PAINLEVEL_OUTOF10: 0-NO PAIN

## 2025-01-16 ENCOUNTER — OFFICE VISIT (OUTPATIENT)
Dept: PRIMARY CARE CLINIC | Age: 50
End: 2025-01-16
Payer: MEDICARE

## 2025-01-16 VITALS
DIASTOLIC BLOOD PRESSURE: 80 MMHG | HEIGHT: 67 IN | SYSTOLIC BLOOD PRESSURE: 130 MMHG | BODY MASS INDEX: 37.61 KG/M2 | TEMPERATURE: 98.8 F | WEIGHT: 239.6 LBS | OXYGEN SATURATION: 98 % | HEART RATE: 102 BPM

## 2025-01-16 DIAGNOSIS — Z00.00 INITIAL MEDICARE ANNUAL WELLNESS VISIT: Primary | ICD-10-CM

## 2025-01-16 DIAGNOSIS — G61.81 CHRONIC INFLAMMATORY DEMYELINATING NEUROPATHY (HCC): ICD-10-CM

## 2025-01-16 DIAGNOSIS — E11.42 CONTROLLED TYPE 2 DIABETES MELLITUS WITH DIABETIC POLYNEUROPATHY, WITHOUT LONG-TERM CURRENT USE OF INSULIN (HCC): ICD-10-CM

## 2025-01-16 DIAGNOSIS — Z12.11 COLON CANCER SCREENING: ICD-10-CM

## 2025-01-16 PROCEDURE — G0438 PPPS, INITIAL VISIT: HCPCS | Performed by: NURSE PRACTITIONER

## 2025-01-16 PROCEDURE — 3044F HG A1C LEVEL LT 7.0%: CPT | Performed by: NURSE PRACTITIONER

## 2025-01-16 PROCEDURE — 3079F DIAST BP 80-89 MM HG: CPT | Performed by: NURSE PRACTITIONER

## 2025-01-16 PROCEDURE — 3075F SYST BP GE 130 - 139MM HG: CPT | Performed by: NURSE PRACTITIONER

## 2025-01-16 PROCEDURE — 3017F COLORECTAL CA SCREEN DOC REV: CPT | Performed by: NURSE PRACTITIONER

## 2025-01-16 RX ORDER — HYDROCORTISONE SODIUM SUCCINATE 100 MG/2ML
100 INJECTION, POWDER, FOR SOLUTION INTRAMUSCULAR; INTRAVENOUS
COMMUNITY
Start: 2024-12-23

## 2025-01-16 SDOH — ECONOMIC STABILITY: FOOD INSECURITY: WITHIN THE PAST 12 MONTHS, YOU WORRIED THAT YOUR FOOD WOULD RUN OUT BEFORE YOU GOT MONEY TO BUY MORE.: NEVER TRUE

## 2025-01-16 SDOH — ECONOMIC STABILITY: FOOD INSECURITY: WITHIN THE PAST 12 MONTHS, THE FOOD YOU BOUGHT JUST DIDN'T LAST AND YOU DIDN'T HAVE MONEY TO GET MORE.: NEVER TRUE

## 2025-01-16 ASSESSMENT — PATIENT HEALTH QUESTIONNAIRE - PHQ9
SUM OF ALL RESPONSES TO PHQ QUESTIONS 1-9: 0
SUM OF ALL RESPONSES TO PHQ9 QUESTIONS 1 & 2: 0
SUM OF ALL RESPONSES TO PHQ QUESTIONS 1-9: 0
2. FEELING DOWN, DEPRESSED OR HOPELESS: NOT AT ALL
1. LITTLE INTEREST OR PLEASURE IN DOING THINGS: NOT AT ALL

## 2025-01-16 ASSESSMENT — ENCOUNTER SYMPTOMS: BLURRED VISION: 0

## 2025-01-16 ASSESSMENT — LIFESTYLE VARIABLES
HOW MANY STANDARD DRINKS CONTAINING ALCOHOL DO YOU HAVE ON A TYPICAL DAY: 1 OR 2
HOW OFTEN DO YOU HAVE A DRINK CONTAINING ALCOHOL: MONTHLY OR LESS

## 2025-01-16 NOTE — PATIENT INSTRUCTIONS
SURVEY:     You may be receiving a survey from [employer] regarding your visit today.     Please complete the survey to enable us to provide the highest quality of care to you and your family.     If you cannot score us a very good on any question, please call the office to discuss how we could have made your experience a very good one.     Thank you,    Reed Teague, APRN-CNP  Arminda Paris, APRN-CNP  Katharine, LPN  Clari, CMA  Addison, CMA  Coretta, CMA  Lore, PCA  Chiquita, CMA  Heike, PM        Learning About Vision Tests  What are vision tests?     The four most common vision tests are visual acuity tests, refraction, visual field tests, and color vision tests.  Visual acuity (sharpness) tests  These tests are used:  To see if you need glasses or contact lenses.  To monitor an eye problem.  To check an eye injury.  Visual acuity tests are done as part of routine exams. You may also have this test when you get your 's license or apply for some types of jobs.  Visual field tests  These tests are used:  To check for vision loss in any area of your range of vision.  To screen for certain eye diseases.  To look for nerve damage after a stroke, head injury, or other problem that could reduce blood flow to the brain.  Refraction and color tests  A refraction test is done to find the right prescription for glasses and contact lenses.  A color vision test is done to check for color blindness.  Color vision is often tested as part of a routine exam. You may also have this test when you apply for a job where recognizing different colors is important, such as , electronics, or the .  How are vision tests done?  Visual acuity test   You cover one eye at a time.  You read aloud from a wall chart across the room.  You read aloud from a small card that you hold in your hand.  Refraction   You look into a special device.  The device puts lenses of different strengths in front of each eye to see how

## 2025-03-05 DIAGNOSIS — E11.42 CONTROLLED TYPE 2 DIABETES MELLITUS WITH DIABETIC POLYNEUROPATHY, WITHOUT LONG-TERM CURRENT USE OF INSULIN (HCC): ICD-10-CM

## 2025-03-05 DIAGNOSIS — E78.5 DYSLIPIDEMIA: ICD-10-CM

## 2025-03-05 RX ORDER — ATORVASTATIN CALCIUM 40 MG/1
40 TABLET, FILM COATED ORAL DAILY
Qty: 90 TABLET | Refills: 3 | Status: SHIPPED | OUTPATIENT
Start: 2025-03-05

## 2025-03-05 RX ORDER — SITAGLIPTIN AND METFORMIN HYDROCHLORIDE 1000; 50 MG/1; MG/1
1 TABLET, FILM COATED ORAL 2 TIMES DAILY WITH MEALS
Qty: 180 TABLET | Refills: 3 | Status: SHIPPED | OUTPATIENT
Start: 2025-03-05

## 2025-03-05 RX ORDER — LOSARTAN POTASSIUM 100 MG/1
100 TABLET ORAL DAILY
Qty: 90 TABLET | Refills: 3 | Status: SHIPPED | OUTPATIENT
Start: 2025-03-05

## (undated) DEVICE — GLOVE SURG SZ 75 L12IN FNGR THK87MIL DK GRN LTX FREE ISOLEX

## (undated) DEVICE — DISCONTINUED USE 394504 SYRINGE LUER LOCK TIP 12 ML

## (undated) DEVICE — SUTURE MCRYL SZ 4 0 L18IN ABSRB UD P 3 3 8 CIR REV CUT NDL MCP494G

## (undated) DEVICE — OCCLUSIVE GAUZE STRIP,3% BISMUTH TRIBROMOPHENATE IN PETROLATUM BLEND: Brand: XEROFORM

## (undated) DEVICE — SOLUTION IV IRRIG POUR BRL 0.9% SODIUM CHL 2F7124

## (undated) DEVICE — Z DISCONTINUED BY MEDLINE USE 2711682 TRAY SKIN PREP DRY W/ PREM GLV

## (undated) DEVICE — SOLUTION SCRB 4OZ 4% CHG CLN BASE FOR PT SKIN ANTISEPSIS

## (undated) DEVICE — HAND AND FT PK

## (undated) DEVICE — CONVERTED USE 323606 PAD CAST COTN ST 3INX4YD

## (undated) DEVICE — HYPODERMIC SAFETY NEEDLE: Brand: MAGELLAN

## (undated) DEVICE — GLOVE SURG SZ 75 L12IN FNGR THK87MIL WHT LTX FREE